# Patient Record
Sex: FEMALE | Race: WHITE | NOT HISPANIC OR LATINO | Employment: FULL TIME | URBAN - METROPOLITAN AREA
[De-identification: names, ages, dates, MRNs, and addresses within clinical notes are randomized per-mention and may not be internally consistent; named-entity substitution may affect disease eponyms.]

---

## 2015-03-03 LAB — PAP: NORMAL

## 2017-03-06 ENCOUNTER — TELEPHONE (OUTPATIENT)
Dept: OBGYN | Facility: CLINIC | Age: 37
End: 2017-03-06

## 2017-03-06 NOTE — TELEPHONE ENCOUNTER
1st OB 3/17. Pt takes advanced piliates and bar class. Wanted to know if safe to do. Informed pt about changes in balance, stamina and growing belly which all affect ability to exercise. ALso discussed the need to stop exercising if becomes light headed or dizzy. Pt also asked about her PNV not having FE in them and wanted to know if she needs to take a supplement. Informed pt to wait until OB visit and see what HGB is at that time, FE can cause constipation and do not need to take if HGB good. PNV does have DHA. No further questions.

## 2017-03-06 NOTE — TELEPHONE ENCOUNTER
Reason for Call:  Other call back    Detailed comments: Pt has 1st OB 3/27 with [facility].  Has questions about if she can still do advanced pilates and bar class while pregnant.    Phone Number Patient can be reached at: Home number on file [phone] (home)    Best Time: anytime    Can we leave a detailed message on this number? YES    Call taken on 3/6/2017 at 8:32 AM by Nahomy Mukherjee

## 2017-03-21 DIAGNOSIS — O36.80X0 ENCOUNTER TO DETERMINE FETAL VIABILITY OF PREGNANCY, NOT APPLICABLE OR UNSPECIFIED FETUS: Primary | ICD-10-CM

## 2017-03-27 ENCOUNTER — PRENATAL OFFICE VISIT (OUTPATIENT)
Dept: OBGYN | Facility: CLINIC | Age: 37
End: 2017-03-27
Payer: COMMERCIAL

## 2017-03-27 ENCOUNTER — RADIANT APPOINTMENT (OUTPATIENT)
Dept: ULTRASOUND IMAGING | Facility: CLINIC | Age: 37
End: 2017-03-27
Payer: COMMERCIAL

## 2017-03-27 VITALS
DIASTOLIC BLOOD PRESSURE: 73 MMHG | WEIGHT: 147.8 LBS | BODY MASS INDEX: 23.75 KG/M2 | HEIGHT: 66 IN | SYSTOLIC BLOOD PRESSURE: 107 MMHG | HEART RATE: 70 BPM

## 2017-03-27 DIAGNOSIS — Z36.9 ENCOUNTER FOR ANTENATAL SCREENING OF MOTHER: ICD-10-CM

## 2017-03-27 DIAGNOSIS — Z23 NEED FOR PROPHYLACTIC VACCINATION AND INOCULATION AGAINST INFLUENZA: ICD-10-CM

## 2017-03-27 DIAGNOSIS — O36.80X0 ENCOUNTER TO DETERMINE FETAL VIABILITY OF PREGNANCY, NOT APPLICABLE OR UNSPECIFIED FETUS: ICD-10-CM

## 2017-03-27 DIAGNOSIS — O09.521 HIGH-RISK PREGNANCY, ELDERLY MULTIGRAVIDA IN FIRST TRIMESTER: Primary | ICD-10-CM

## 2017-03-27 PROBLEM — O09.519 ELDERLY PRIMIGRAVIDA, ANTEPARTUM: Status: ACTIVE | Noted: 2017-03-27

## 2017-03-27 LAB
ABO + RH BLD: NORMAL
ABO + RH BLD: NORMAL
ALBUMIN UR-MCNC: NEGATIVE MG/DL
APPEARANCE UR: CLEAR
BACTERIA #/AREA URNS HPF: ABNORMAL /HPF
BASOPHILS # BLD AUTO: 0 10E9/L (ref 0–0.2)
BASOPHILS NFR BLD AUTO: 0.1 %
BILIRUB UR QL STRIP: NEGATIVE
BLD GP AB SCN SERPL QL: NORMAL
BLOOD BANK CMNT PATIENT-IMP: NORMAL
COLOR UR AUTO: YELLOW
DIFFERENTIAL METHOD BLD: NORMAL
EOSINOPHIL # BLD AUTO: 0.1 10E9/L (ref 0–0.7)
EOSINOPHIL NFR BLD AUTO: 0.5 %
ERYTHROCYTE [DISTWIDTH] IN BLOOD BY AUTOMATED COUNT: 12.1 % (ref 10–15)
GLUCOSE UR STRIP-MCNC: NEGATIVE MG/DL
HCT VFR BLD AUTO: 38.8 % (ref 35–47)
HGB BLD-MCNC: 13 G/DL (ref 11.7–15.7)
HGB UR QL STRIP: NEGATIVE
KETONES UR STRIP-MCNC: NEGATIVE MG/DL
LEUKOCYTE ESTERASE UR QL STRIP: NEGATIVE
LYMPHOCYTES # BLD AUTO: 3 10E9/L (ref 0.8–5.3)
LYMPHOCYTES NFR BLD AUTO: 32.4 %
MCH RBC QN AUTO: 31.4 PG (ref 26.5–33)
MCHC RBC AUTO-ENTMCNC: 33.5 G/DL (ref 31.5–36.5)
MCV RBC AUTO: 94 FL (ref 78–100)
MONOCYTES # BLD AUTO: 0.8 10E9/L (ref 0–1.3)
MONOCYTES NFR BLD AUTO: 8.9 %
MUCOUS THREADS #/AREA URNS LPF: PRESENT /LPF
NEUTROPHILS # BLD AUTO: 5.4 10E9/L (ref 1.6–8.3)
NEUTROPHILS NFR BLD AUTO: 58.1 %
NITRATE UR QL: NEGATIVE
NON-SQ EPI CELLS #/AREA URNS LPF: ABNORMAL /LPF
PH UR STRIP: 5.5 PH (ref 5–7)
PLATELET # BLD AUTO: 327 10E9/L (ref 150–450)
RBC # BLD AUTO: 4.14 10E12/L (ref 3.8–5.2)
RBC #/AREA URNS AUTO: ABNORMAL /HPF (ref 0–2)
SP GR UR STRIP: 1.02 (ref 1–1.03)
SPECIMEN EXP DATE BLD: NORMAL
URN SPEC COLLECT METH UR: ABNORMAL
UROBILINOGEN UR STRIP-ACNC: 0.2 EU/DL (ref 0.2–1)
WBC # BLD AUTO: 9.2 10E9/L (ref 4–11)
WBC #/AREA URNS AUTO: ABNORMAL /HPF (ref 0–2)

## 2017-03-27 PROCEDURE — 87389 HIV-1 AG W/HIV-1&-2 AB AG IA: CPT | Performed by: OBSTETRICS & GYNECOLOGY

## 2017-03-27 PROCEDURE — 86900 BLOOD TYPING SEROLOGIC ABO: CPT | Performed by: OBSTETRICS & GYNECOLOGY

## 2017-03-27 PROCEDURE — 86762 RUBELLA ANTIBODY: CPT | Performed by: OBSTETRICS & GYNECOLOGY

## 2017-03-27 PROCEDURE — 36415 COLL VENOUS BLD VENIPUNCTURE: CPT | Performed by: OBSTETRICS & GYNECOLOGY

## 2017-03-27 PROCEDURE — 85025 COMPLETE CBC W/AUTO DIFF WBC: CPT | Performed by: OBSTETRICS & GYNECOLOGY

## 2017-03-27 PROCEDURE — 99207 ZZC FIRST OB VISIT: CPT | Performed by: OBSTETRICS & GYNECOLOGY

## 2017-03-27 PROCEDURE — 86850 RBC ANTIBODY SCREEN: CPT | Performed by: OBSTETRICS & GYNECOLOGY

## 2017-03-27 PROCEDURE — 90471 IMMUNIZATION ADMIN: CPT | Performed by: OBSTETRICS & GYNECOLOGY

## 2017-03-27 PROCEDURE — 86901 BLOOD TYPING SEROLOGIC RH(D): CPT | Performed by: OBSTETRICS & GYNECOLOGY

## 2017-03-27 PROCEDURE — 76817 TRANSVAGINAL US OBSTETRIC: CPT | Performed by: OBSTETRICS & GYNECOLOGY

## 2017-03-27 PROCEDURE — 87340 HEPATITIS B SURFACE AG IA: CPT | Performed by: OBSTETRICS & GYNECOLOGY

## 2017-03-27 PROCEDURE — 90686 IIV4 VACC NO PRSV 0.5 ML IM: CPT | Performed by: OBSTETRICS & GYNECOLOGY

## 2017-03-27 PROCEDURE — 81001 URINALYSIS AUTO W/SCOPE: CPT | Performed by: OBSTETRICS & GYNECOLOGY

## 2017-03-27 PROCEDURE — 86780 TREPONEMA PALLIDUM: CPT | Performed by: OBSTETRICS & GYNECOLOGY

## 2017-03-27 PROCEDURE — 87086 URINE CULTURE/COLONY COUNT: CPT | Performed by: OBSTETRICS & GYNECOLOGY

## 2017-03-27 RX ORDER — PRENATAL VIT/IRON FUM/FOLIC AC 27MG-0.8MG
1 TABLET ORAL DAILY
COMMUNITY

## 2017-03-27 ASSESSMENT — ANXIETY QUESTIONNAIRES
5. BEING SO RESTLESS THAT IT IS HARD TO SIT STILL: NOT AT ALL
7. FEELING AFRAID AS IF SOMETHING AWFUL MIGHT HAPPEN: NOT AT ALL
GAD7 TOTAL SCORE: 0
6. BECOMING EASILY ANNOYED OR IRRITABLE: NOT AT ALL
3. WORRYING TOO MUCH ABOUT DIFFERENT THINGS: NOT AT ALL
2. NOT BEING ABLE TO STOP OR CONTROL WORRYING: NOT AT ALL
4. TROUBLE RELAXING: NOT AT ALL
IF YOU CHECKED OFF ANY PROBLEMS ON THIS QUESTIONNAIRE, HOW DIFFICULT HAVE THESE PROBLEMS MADE IT FOR YOU TO DO YOUR WORK, TAKE CARE OF THINGS AT HOME, OR GET ALONG WITH OTHER PEOPLE: NOT DIFFICULT AT ALL
1. FEELING NERVOUS, ANXIOUS, OR ON EDGE: NOT AT ALL

## 2017-03-27 NOTE — PROGRESS NOTES
This is a 36 year old female patient,   who presents for her first obstetrical visit.    Patient's last menstrual period was 2017 (approximate)..  This gives her an EDC of 2017 .  She is 8w0d weeks.  Her cycles are regular.  Her last menstrual period was normal.  She has had an ultrasound on 3/27/2017 which showed viable IUP measuring 8w0d. .  Since her LMP, she has experienced  nausea, emesis, fatigue, urinary frequency and breast tenderness, gas pains and difficulty sleeping.).  She denies abdominal pain, headache, loss of appetite, vaginal discharge, dysuria, pelvic pain, urinary urgency, lightheadedness, urinary frequency, vaginal bleeding, hemorrhoids and constipation.    Past History:  Her past medical history   Past Medical History:   Diagnosis Date     Exercise induced bronchospasm      Uncomplicated asthma     Exercise induced asthma, has an emergency inhaler is needed, hasn't used in many years.   .      This is her first pregnancy.  This was an unplanned pregnancy and patient took morning after pill day after she thinks she conceived  Stopped using alcohol and THC when she found out she was pregnant  Discussed ama and testing options   She wants to start with inatal only and will return for labs in 2 wks  Ob labs today  No other current medical issues    Since her last LMP she admits to the use of tobacco (cigarettes), alcohol and marijuana and none since found out is pregnant..    HISTORY:  Obstetric History       T0      TAB0   SAB0   E0   M0   L0       # Outcome Date GA Lbr Benito/2nd Weight Sex Delivery Anes PTL Lv   1 Current                 Past Medical History:   Diagnosis Date     Exercise induced bronchospasm      Uncomplicated asthma     Exercise induced asthma, has an emergency inhaler is needed, hasn't used in many years.     Past Surgical History:   Procedure Laterality Date     BREAST SURGERY  [id number]    Breast Augmentatation, saline implants.     ENT SURGERY   2000    [place] teeth removed.     Family History   Problem Relation Age of Onset     Other - See Comments Mother      Neck tumor-was removed and was benign.     Hypertension Mother      DIABETES Father      Colon Cancer Maternal Grandmother      Myocardial Infarction Maternal Grandfather      CEREBROVASCULAR DISEASE Paternal Grandmother      Myocardial Infarction Paternal Grandfather      CEREBROVASCULAR DISEASE Other      Myocardial Infarction Maternal Uncle      HEART DISEASE Maternal Uncle      CEREBROVASCULAR DISEASE Maternal Uncle      Social History     Social History     Marital status:      Spouse name: N/A     Number of children: N/A     Years of education: N/A     Social History Main Topics     Smoking status: Former Smoker     Years: 10.00     Quit date: 3/5/2017     Smokeless tobacco: Current User      Comment: 1-2 cigarettes per day; social smoker     Alcohol use Yes      Comment: once per week socially     Drug use: No     Sexual activity: Yes     Partners: Male     Birth control/ protection: Inserts      Comment: concetprol     Other Topics Concern     None     Social History Narrative     Current Outpatient Prescriptions   Medication Sig     Prenatal Vit-Fe Fumarate-FA (PRENATAL MULTIVITAMIN  PLUS IRON) 27-0.8 MG TABS per tablet Take 1 tablet by mouth daily     Pediatric Multivitamins-Fl (MULTI VIT/FL PO) Reported on 3/27/2017     NEW MED Reported on 3/27/2017     DIPROLENE AF 0.05 % EX CREA apply twice daily on the affected areas (Patient not taking: No sig reported)     No current facility-administered medications for this visit.      Allergies   Allergen Reactions     Nkda [No Known Drug Allergies]        Past medical, surgical, social and family history were reviewed and updated in EPIC.    ROS:   12 point review of systems negative other than symptoms noted below.  Constitutional: Fatigue  Breast: Tenderness  Gastrointestinal: Nausea and Vomiting  Genitourinary: Frequency  Psychiatric:  "Difficulty Sleeping    EXAM:  /73 (BP Location: Right arm, Patient Position: Chair, Cuff Size: Adult Regular)  Pulse 70  Ht 1.664 m (5' 5.5\")  Wt 67 kg (147 lb 12.8 oz)  LMP 2017 (Approximate)  BMI 24.22 kg/m2   BMI: Body mass index is 24.22 kg/(m^2).    EXAM:  Constitutional:  Appearance: Well nourished, well developed alert, in no acute distress.    Neurologic/Psychiatric:    Mental Status:  Oriented X3.    No Pelvic Exam performed    ASSESSMENT:      ICD-10-CM    1. High-risk pregnancy, elderly multigravida in first trimester O09.521    2. Encounter for  screening of mother Z36 Conventional pap smear, diagnostic     UA with Microscopic     ABO/Rh type and screen     Anti Treponema     CBC with platelets differential     Hepatitis B surface antigen     HIV Antigen Antibody Combo     Rubella Antibody IgG Quantitative     Urine Culture Aerobic Bacterial     Prenatal trisomy 21,18,13   3. Need for prophylactic vaccination and inoculation against influenza Z23 FLU VAC, SPLIT VIRUS IM > 3 YO (QUADRIVALENT) [97255]     Vaccine Administration, Initial [31132]       PLAN/PATIENT INSTRUCTIONS:    There are no Patient Instructions on file for this visit.    Face to face time 30 minute  Discussed ob dating, treatment of nausea, genetic screening in details  Reviewed us results  Labs today    Caryn Lozano MD  "

## 2017-03-27 NOTE — NURSING NOTE
[facility] Obstetrical Risk History    Patient presents for new OB labs and teaching.      1. Please indicate any condition you have or have had in the past:  Asthma,   2. Do you smoke?  Yes-stopped once she found out is pregnant  If yes, how many packs/day?   3. Do you drink alcoholic beverages? Yes-stopped when found out is pregnant  If yes, how often?  What type?   4. List any medications taken since your last period: Prenatal Vitamins, Probiotic  5. List any recreational drugs (cocaine, marijuana, etc. used since your last period: Marijuana on 3/4/17-did that socially, none since found out is pregnant     6. List any chemical or radiation exposure that you've encountered: None  7. Are you on a restricted diet? No  If yes, please describe:  Do you have any [employer] objections to any form of treatment? No    GENETIC SCREENING    These questions apply to you, the baby's father, or anyone in either family with:    1. Patient's age 35 or greater at delivery? Yes-pt is over 35 years old  2. [place]ian, [place]ib[place]ian, Mediterranean ancestry? No  3. Neural Tube Defect (Meningomyelocele, Spina Bifida, or Anencephaly)? No  4. Scientologist, Sinhala [place] or history of Jordy-Sachs disease? No  5. Down's Syndrome?   No  6. Hemophilia or clotting disorder? No  7. Muscular Dystrophy? No  8. Cystic Fibrosis? No  9. Feliberto's Chorea? No//  10. Mental Retardation? No  11. 3 or more miscarriages or a stillborn? No  12. Other inherited disease or chromosomal disorder? No  13. Have you or the baby's father had a child born with a birth defect? No  14. Did you or the baby's father have a birth defect yourselves? No    Do you have any other concerns about birth defects? No      1st pregnancy. Last time was in the office was on 3/2/15 which was for her annual exam. Has not had an annual since then.    Pt came to the NOB class and was informed of genetic screening/genetic testing. Pt states she is wanting to have the Innatal (Verifi) done.  Pt aware she has to be at least 10 wks pregnant. Lab order placed/futured out.    Last PAP was done at our clinic ([facility]) on 3/3/15 and results were NIL and HPV Negative.     Did not get a Flu Shot this season. Pt stated she will more than likely get one today. Discussed precautions with pregnancy and the flu and that the immunization is recommended.    Conceived on 2/11/17 and took the morning after pill on 2/12/17.

## 2017-03-27 NOTE — PROGRESS NOTES
Injectable Influenza Immunization Documentation    1.  Is the person to be vaccinated sick today?  No    2. Does the person to be vaccinated have an allergy to eggs or to a component of the vaccine?  No    3. Has the person to be vaccinated today ever had a serious reaction to influenza vaccine in the past?  No    4. Has the person to be vaccinated ever had Guillain-[place] syndrome?  No     Form completed by Marlena Rashid CMA

## 2017-03-27 NOTE — Clinical Note
Please abstract the following data from this visit with this patient into the appropriate field in Epic:  Pap smear done on this date: 3/3/2015 (approximately), by this group: [facility], results were Negative for Intraepithelial Lesion or Malignancy.

## 2017-03-27 NOTE — MR AVS SNAPSHOT
After Visit Summary   3/27/2017    An Maya    MRN: [id number]           Patient Information     Date Of Birth          1980        Visit Information        Provider Department      3/27/2017 10:50 AM Caryn Lozano MD; [facility] [place]        Today's Diagnoses     Encounter for  screening of mother    -  1    Elderly primigravida, antepartum        Need for prophylactic vaccination and inoculation against influenza           Follow-ups after your visit        Your next 10 appointments already scheduled     Apr 10, 2017 10:00 AM CDT   LAB with [facility]   [facility] ([facility] [place])    [address] [id number] [phone]           Patient must bring picture ID.  Patient should be prepared to give a urine specimen  Please do not eat 10-12 hours before your appointment if you are coming in fasting for labs on lipids, cholesterol, or glucose (sugar).  Pregnant women should follow their Care Team instructions. Water with medications is okay. Do not drink coffee or other fluids.   If you have concerns about taking  your medications, please ask at office or if scheduling via [email or web address], send a message by clicking on Secure Messaging, Message Your Care Team.            2017  9:50 AM CDT   ESTABLISHED PRENATAL with Caryn Lozano MD   [facility] ([facility] [place])    [address] [place] [id number] [phone]              Future tests that were ordered for you today     Open Future Orders        Priority Expected Expires Ordered    Prenatal trisomy 21,18,13 Routine 4/10/2017 2017 3/27/2017            Who to contact     If you have questions or need follow up information about today's clinic visit or your schedule please contact [facility] [place] directly at [phone].  Normal or non-critical lab and  "imaging results will be communicated to you by MyChart, letter or phone within 4 business days after the clinic has received the results. If you do not hear from us within 7 days, please contact the clinic through HDmessagingt or phone. If you have a critical or abnormal lab result, we will notify you by phone as soon as possible.  Submit refill requests through EZDOCTOR or call your pharmacy and they will forward the refill request to us. Please allow 3 business days for your refill to be completed.          Additional Information About Your Visit        DaptharLumetric Lighting Information     EZDOCTOR lets you send messages to your doctor, view your test results, renew your prescriptions, schedule appointments and more. To sign up, go to [email or web address] . Click on \"Log in\" on the left side of the screen, which will take you to the Welcome page. Then click on \"Sign up Now\" on the right side of the page.     You will be asked to enter the access code listed below, as well as some personal information. Please follow the directions to create your username and password.     Your access code is: [email or web address]  Expires: 2017 12:10 PM     Your access code will  in 90 days. If you need help or a new code, please call your [place] clinic or [phone].        Care EveryWhere ID     This is your Care EveryWhere ID. This could be used by other organizations to access your [place] medical records  [id number]        Your Vitals Were     Pulse Height Last Period BMI (Body Mass Index)          70 5' 5.5\" (1.664 m) 2017 (Approximate) 24.22 kg/m2         Blood Pressure from Last 3 Encounters:   17 107/73   10/13/11 119/75   09 95/61    Weight from Last 3 Encounters:   17 147 lb 12.8 oz (67 kg)   09 142 lb 8 oz (64.6 kg)              We Performed the Following     ABO/Rh type and screen     Anti Treponema     CBC with platelets differential     Conventional pap smear, diagnostic     FLU VAC, SPLIT " VIRUS IM > 3 YO (QUADRIVALENT) [[id number]]     Hepatitis B surface antigen     HIV Antigen Antibody Combo     Rubella Antibody IgG Quantitative     UA with Microscopic     Urine Culture Aerobic Bacterial     Vaccine Administration, Initial [[id number]]        Primary Care Provider    None Specified       No primary provider on file.        Thank you!     Thank you for choosing [facility] [place]  for your care. Our goal is always to provide you with excellent care. Hearing back from our patients is one way we can continue to improve our services. Please take a few minutes to complete the written survey that you may receive in the mail after your visit with us. Thank you!             Your Updated Medication List - Protect others around you: Learn how to safely use, store and throw away your medicines at www.disposemymeds.org.          This list is accurate as of: 3/27/17 12:10 PM.  Always use your most recent med list.                   Brand Name Dispense Instructions for use    DIPROLENE AF 0.05 % cream   Generic drug:  augmented betamethasone dipropionate     1tube    apply twice daily on the affected areas       MULTI VIT/FL PO      Reported on 3/27/2017       NEW MED      Reported on 3/27/2017       prenatal multivitamin  plus iron 27-0.8 MG Tabs per tablet      Take 1 tablet by mouth daily

## 2017-03-28 LAB
BACTERIA SPEC CULT: NO GROWTH
HBV SURFACE AG SERPL QL IA: NONREACTIVE
HIV 1+2 AB+HIV1 P24 AG SERPL QL IA: NORMAL
Lab: NORMAL
MICRO REPORT STATUS: NORMAL
RUBV IGG SERPL IA-ACNC: 26 IU/ML
SPECIMEN SOURCE: NORMAL
T PALLIDUM IGG+IGM SER QL: NEGATIVE

## 2017-03-28 ASSESSMENT — ANXIETY QUESTIONNAIRES: GAD7 TOTAL SCORE: 0

## 2017-03-28 ASSESSMENT — PATIENT HEALTH QUESTIONNAIRE - PHQ9: SUM OF ALL RESPONSES TO PHQ QUESTIONS 1-9: 0

## 2017-04-10 ENCOUNTER — TRANSFERRED RECORDS (OUTPATIENT)
Dept: HEALTH INFORMATION MANAGEMENT | Facility: CLINIC | Age: 37
End: 2017-04-10

## 2017-04-10 DIAGNOSIS — Z36.9 ENCOUNTER FOR ANTENATAL SCREENING OF MOTHER: ICD-10-CM

## 2017-04-10 PROCEDURE — 40000791 ZZHCL STATISTIC VERIFI PRENATAL TRISOMY 21,18,13: Mod: 90 | Performed by: OBSTETRICS & GYNECOLOGY

## 2017-04-10 PROCEDURE — 36415 COLL VENOUS BLD VENIPUNCTURE: CPT | Performed by: OBSTETRICS & GYNECOLOGY

## 2017-04-10 PROCEDURE — 99000 SPECIMEN HANDLING OFFICE-LAB: CPT | Performed by: OBSTETRICS & GYNECOLOGY

## 2017-04-17 ENCOUNTER — TELEPHONE (OUTPATIENT)
Dept: NURSING | Facility: CLINIC | Age: 37
End: 2017-04-17

## 2017-04-17 DIAGNOSIS — O09.519 ELDERLY PRIMIGRAVIDA, ANTEPARTUM: ICD-10-CM

## 2017-04-17 NOTE — TELEPHONE ENCOUNTER
Verifi results: Negative    Test    Result     Interpretation  Chromosome 21  No aneuploidy detected     Results consistent with two copies of chromosome 21  Chromosome 18  No aneuploidy detected  Results consistent with two copies of chromosome 18  Chromosome 13  No aneuploidy detected  Results consistent with two copies of chromosome 13  Sex Chromosome  No aneuploidy detected  Results consistent with two sex chromosomes (XY-Male)    [facility]

## 2017-04-20 ENCOUNTER — TELEPHONE (OUTPATIENT)
Dept: NURSING | Facility: CLINIC | Age: 37
End: 2017-04-20

## 2017-04-20 NOTE — TELEPHONE ENCOUNTER
Pt spoke to scheduling and had questions about what to take with her current cough.  Pt 11w3d pregnant. Called pt. She stated she started to get a cold on Monday 11/17/17. Pt denies fever or muscle or body aches, states some chest congestion in the morning, sinus congestion and cough. Informed pt she could use saline nasal spray, afrin nasal spray for 3-4 days, plain sudafed, tylenol, cough drops, and/or plain Robitussin. Pt stated understanding and had no further questions.

## 2017-04-24 ENCOUNTER — PRENATAL OFFICE VISIT (OUTPATIENT)
Dept: OBGYN | Facility: CLINIC | Age: 37
End: 2017-04-24
Payer: COMMERCIAL

## 2017-04-24 VITALS — BODY MASS INDEX: 25.07 KG/M2 | SYSTOLIC BLOOD PRESSURE: 102 MMHG | WEIGHT: 153 LBS | DIASTOLIC BLOOD PRESSURE: 62 MMHG

## 2017-04-24 DIAGNOSIS — O09.511: ICD-10-CM

## 2017-04-24 PROCEDURE — 99207 ZZC PRENATAL VISIT: CPT | Performed by: OBSTETRICS & GYNECOLOGY

## 2017-04-24 NOTE — MR AVS SNAPSHOT
"              After Visit Summary   4/24/2017    An Maya    MRN: [id number]           Patient Information     Date Of Birth          1980        Visit Information        Provider Department      4/24/2017 9:50 AM Caryn Lozano MD [facility]        Today's Diagnoses     Elderly primigravida, antepartum, first trimester           Follow-ups after your visit        Your next 10 appointments already scheduled     May 23, 2017 10:40 AM CDT   ESTABLISHED PRENATAL with Caryn Lozano MD   [facility] ([facility])    [address] [place] [id number]   [phone]              Who to contact     If you have questions or need follow up information about today's clinic visit or your schedule please contact [facility] directly at [phone].  Normal or non-critical lab and imaging results will be communicated to you by MyChart, letter or phone within 4 business days after the clinic has received the results. If you do not hear from us within 7 days, please contact the clinic through MyChart or phone. If you have a critical or abnormal lab result, we will notify you by phone as soon as possible.  Submit refill requests through [email or web address] or call your pharmacy and they will forward the refill request to us. Please allow 3 business days for your refill to be completed.          Additional Information About Your Visit        MyChart Information     [email or web address] lets you send messages to your doctor, view your test results, renew your prescriptions, schedule appointments and more. To sign up, go to [email or web address] . Click on \"Log in\" on the left side of the screen, which will take you to the Welcome page. Then click on \"Sign up Now\" on the right side of the page.     You will be asked to enter the access code listed below, as well as some personal information. Please follow the directions to create your " username and password.     Your access code is: [email or web address]  Expires: 2017 12:10 PM     Your access code will  in 90 days. If you need help or a new code, please call your [place] clinic or [phone].        Care EveryWhere ID     This is your Care EveryWhere ID. This could be used by other organizations to access your [place] medical records  [id number]        Your Vitals Were     Last Period BMI (Body Mass Index)                2017 (Approximate) 25.07 kg/m2           Blood Pressure from Last 3 Encounters:   17 102/62   17 107/73   10/13/11 119/75    Weight from Last 3 Encounters:   17 153 lb (69.4 kg)   17 147 lb 12.8 oz (67 kg)   09 142 lb 8 oz (64.6 kg)              Today, you had the following     No orders found for display       Primary Care Provider    None Specified       No primary provider on file.        Thank you!     Thank you for choosing [facility] [place]  for your care. Our goal is always to provide you with excellent care. Hearing back from our patients is one way we can continue to improve our services. Please take a few minutes to complete the written survey that you may receive in the mail after your visit with us. Thank you!             Your Updated Medication List - Protect others around you: Learn how to safely use, store and throw away your medicines at www.disposemymeds.org.          This list is accurate as of: 17  9:58 AM.  Always use your most recent med list.                   Brand Name Dispense Instructions for use    prenatal multivitamin  plus iron 27-0.8 MG Tabs per tablet      Take 1 tablet by mouth daily

## 2017-05-23 ENCOUNTER — PRENATAL OFFICE VISIT (OUTPATIENT)
Dept: OBGYN | Facility: CLINIC | Age: 37
End: 2017-05-23
Payer: COMMERCIAL

## 2017-05-23 VITALS — SYSTOLIC BLOOD PRESSURE: 92 MMHG | BODY MASS INDEX: 25.89 KG/M2 | WEIGHT: 158 LBS | DIASTOLIC BLOOD PRESSURE: 68 MMHG

## 2017-05-23 DIAGNOSIS — O09.512: ICD-10-CM

## 2017-05-23 DIAGNOSIS — Z36.1 NEED FOR MATERNAL SERUM ALPHA-PROTEIN (MSAFP) SCREENING: Primary | ICD-10-CM

## 2017-05-23 PROCEDURE — 36415 COLL VENOUS BLD VENIPUNCTURE: CPT | Performed by: OBSTETRICS & GYNECOLOGY

## 2017-05-23 PROCEDURE — 82105 ALPHA-FETOPROTEIN SERUM: CPT | Mod: 90 | Performed by: OBSTETRICS & GYNECOLOGY

## 2017-05-23 PROCEDURE — 99000 SPECIMEN HANDLING OFFICE-LAB: CPT | Performed by: OBSTETRICS & GYNECOLOGY

## 2017-05-23 PROCEDURE — 99207 ZZC PRENATAL VISIT: CPT | Performed by: OBSTETRICS & GYNECOLOGY

## 2017-05-23 NOTE — MR AVS SNAPSHOT
After Visit Summary   5/23/2017    An Maya    MRN: [id number]           Patient Information     Date Of Birth          1980        Visit Information        Provider Department      5/23/2017 10:40 AM Caryn Lozano MD [facility] [place]        Today's Diagnoses     Need for maternal serum alpha-protein (MSAFP) screening    -  1    Elderly primigravida, antepartum, second trimester           Follow-ups after your visit        Your next 10 appointments already scheduled     Jun 20, 2017  9:00 AM CDT   US PELVIC COMPLETE W TRANSVAGINAL with WEUS1   [facility] [facility] ([facility])    [address] [address]   [phone]           Please bring a list of your medicines (including vitamins, minerals and over-the-counter drugs). Also, tell your doctor about any allergies you may have. Wear comfortable clothes and leave your valuables at home.  Adults: Drink six 8-ounce glasses of fluid one hour before your exam. Do NOT empty your bladder.  If you need to empty your bladder before your exam, try to release only a little bit of urine. Then, drink another 8oz glass of fluid.  Children: Children who are potty trained should drink at least 4 cups (32 oz) of liquid 45 minutes to one hour prior to the exam. The child s bladder must be full in order to achieve a diagnostic exam. If your child is very uncomfortable or has an urgent need to pee, please notify a technologist; they will try to find out how much longer the wait may be and provide instructions to help relieve the pressure. Occasionally it is medically necessary to insert a urinary catheter to fill the bladder.  Please call the Imaging Department at your exam site with any questions.            Jun 20, 2017  9:40 AM CDT   ESTABLISHED PRENATAL with Caryn Lozano MD   [facility] [place] ([facility] [place])    [address] [facility]  "[facility] [address]  [facility] [address]   [phone]              Who to contact     If you have questions or need follow up information about today's clinic visit or your schedule please contact [facility] directly at [phone].  Normal or non-critical lab and imaging results will be communicated to you by MyChart, letter or phone within 4 business days after the clinic has received the results. If you do not hear from us within 7 days, please contact the clinic through MyChart or phone. If you have a critical or abnormal lab result, we will notify you by phone as soon as possible.  Submit refill requests through [facility] or call your pharmacy and they will forward the refill request to us. Please allow 3 business days for your refill to be completed.          Additional Information About Your Visit        MyChart Information     [facility] lets you send messages to your doctor, view your test results, renew your prescriptions, schedule appointments and more. To sign up, go to [email or web address] . Click on \"Log in\" on the left side of the screen, which will take you to the Welcome page. Then click on \"Sign up Now\" on the right side of the page.     You will be asked to enter the access code listed below, as well as some personal information. Please follow the directions to create your username and password.     Your access code is: [email or web address]  Expires: 2017 12:10 PM     Your access code will  in 90 days. If you need help or a new code, please call your [place] clinic or [phone].        Care EveryWhere ID     This is your Care EveryWhere ID. This could be used by other organizations to access your [place] medical records  [id number]        Your Vitals Were     Last Period BMI (Body Mass Index)                2017 (Approximate) 25.89 kg/m2           Blood Pressure from Last 3 Encounters:   17 92/68   17 102/62   17 107/73    Weight from Last 3 " Encounters:   05/23/17 158 lb (71.7 kg)   04/24/17 153 lb (69.4 kg)   03/27/17 147 lb 12.8 oz (67 kg)              We Performed the Following     Alpha fetoprotein maternal screen        Primary Care Provider    None Specified       No primary provider on file.        Thank you!     Thank you for choosing [facility]  for your care. Our goal is always to provide you with excellent care. Hearing back from our patients is one way we can continue to improve our services. Please take a few minutes to complete the written survey that you may receive in the mail after your visit with us. Thank you!             Your Updated Medication List - Protect others around you: Learn how to safely use, store and throw away your medicines at www.disposemymeds.org.          This list is accurate as of: 5/23/17 11:18 AM.  Always use your most recent med list.                   Brand Name Dispense Instructions for use    prenatal multivitamin  plus iron 27-0.8 MG Tabs per tablet      Take 1 tablet by mouth daily

## 2017-05-26 LAB
# FETUSES US: NORMAL
AFP ADJ MOM AMN: 1.47
AFP SERPL-MCNC: 47 NG/ML
AGE - REPORTED: 38
DATING METHOD: NORMAL
DIABETIC AT CONCEPTION: NO
FAMILY MEMBER DISEASES HX: NO
FAMILY MEMBER DISEASES HX: NO
GA METHOD: NORMAL
GA: 16.14 WK
HX OF HEREDITARY DISORDERS: NO
IDDM PATIENT QL: NO
INTEGRATED SCN PATIENT-IMP: NORMAL
LMP START DATE: NORMAL
PREV HX CHROMOSOME ABNORMALITY: NO
SPECIMEN DRAWN SERPL: NORMAL
TWINS: NO

## 2017-06-20 ENCOUNTER — PRENATAL OFFICE VISIT (OUTPATIENT)
Dept: OBGYN | Facility: CLINIC | Age: 37
End: 2017-06-20
Payer: COMMERCIAL

## 2017-06-20 ENCOUNTER — RADIANT APPOINTMENT (OUTPATIENT)
Dept: ULTRASOUND IMAGING | Facility: CLINIC | Age: 37
End: 2017-06-20
Payer: COMMERCIAL

## 2017-06-20 VITALS — WEIGHT: 167 LBS | BODY MASS INDEX: 27.37 KG/M2 | DIASTOLIC BLOOD PRESSURE: 60 MMHG | SYSTOLIC BLOOD PRESSURE: 100 MMHG

## 2017-06-20 DIAGNOSIS — O09.512: Primary | ICD-10-CM

## 2017-06-20 DIAGNOSIS — Z36.89 ENCOUNTER FOR FETAL ANATOMIC SURVEY: ICD-10-CM

## 2017-06-20 PROCEDURE — 76805 OB US >/= 14 WKS SNGL FETUS: CPT | Performed by: OBSTETRICS & GYNECOLOGY

## 2017-06-20 PROCEDURE — 99207 ZZC PRENATAL VISIT: CPT | Performed by: OBSTETRICS & GYNECOLOGY

## 2017-06-20 NOTE — MR AVS SNAPSHOT
After Visit Summary   6/20/2017    An Maya    MRN: [id number]           Patient Information     Date Of Birth          1980        Visit Information        Provider Department      6/20/2017 9:40 AM Caryn Lozano MD [facility] [place]         Follow-ups after your visit        Your next 10 appointments already scheduled     Jul 18, 2017  9:00 AM CDT   ESTABLISHED PRENATAL with Caryn Lozano MD   [facility] [place] ([facility])    [address] [place] [address]   [phone]            Aug 15, 2017  8:00 AM CDT   LAB with WE LAB   [facility] ([facility] [place])    [address] [place] [id number] [phone]           Patient must bring picture ID.  Patient should be prepared to give a urine specimen  Please do not eat 10-12 hours before your appointment if you are coming in fasting for labs on lipids, cholesterol, or glucose (sugar).  Pregnant women should follow their Care Team instructions. Water with medications is okay. Do not drink coffee or other fluids.   If you have concerns about taking  your medications, please ask at office or if scheduling via [employer], send a message by clicking on Secure Messaging, Message Your Care Team.            Aug 15, 2017  8:15 AM CDT   US PELVIC COMPLETE W TRANSVAGINAL with WEUS1   [facility] [place] ([facility] [place])    [address] [place] [id number] [phone]           Please bring a list of your medicines (including vitamins, minerals and over-the-counter drugs). Also, tell your doctor about any allergies you may have. Wear comfortable clothes and leave your valuables at home.  Adults: Drink six 8-ounce glasses of fluid one hour before your exam. Do NOT empty your bladder.  If you need to empty your bladder before your exam, try to release only a  "little bit of urine. Then, drink another 8oz glass of fluid.  Children: Children who are potty trained should drink at least 4 cups (32 oz) of liquid 45 minutes to one hour prior to the exam. The child s bladder must be full in order to achieve a diagnostic exam. If your child is very uncomfortable or has an urgent need to pee, please notify a technologist; they will try to find out how much longer the wait may be and provide instructions to help relieve the pressure. Occasionally it is medically necessary to insert a urinary catheter to fill the bladder.  Please call the Imaging Department at your exam site with any questions.            Aug 15, 2017  9:00 AM CDT   ESTABLISHED PRENATAL with Caryn Lozano MD   [facility] ([facility])    [address] [place] [address] [phone]              Who to contact     If you have questions or need follow up information about today's clinic visit or your schedule please contact [facility] directly at [phone].  Normal or non-critical lab and imaging results will be communicated to you by "[employer]"hart, letter or phone within 4 business days after the clinic has received the results. If you do not hear from us within 7 days, please contact the clinic through "[employer]hart or phone. If you have a critical or abnormal lab result, we will notify you by phone as soon as possible.  Submit refill requests through [employer] or call your pharmacy and they will forward the refill request to us. Please allow 3 business days for your refill to be completed.          Additional Information About Your Visit        "[employer]hart Information     [employer] lets you send messages to your doctor, view your test results, renew your prescriptions, schedule appointments and more. To sign up, go to [email or web address] . Click on \"Log in\" on the left side of the screen, which will take you to the Welcome page. Then " "click on \"Sign up Now\" on the right side of the page.     You will be asked to enter the access code listed below, as well as some personal information. Please follow the directions to create your username and password.     Your access code is: [email or web address]  Expires: 2017 12:10 PM     Your access code will  in 90 days. If you need help or a new code, please call your [place] clinic or [phone].        Care EveryWhere ID     This is your Care EveryWhere ID. This could be used by other organizations to access your [place] medical records  [id number]        Your Vitals Were     Last Period BMI (Body Mass Index)                2017 (Approximate) 27.37 kg/m2           Blood Pressure from Last 3 Encounters:   17 100/60   17 92/68   17 102/62    Weight from Last 3 Encounters:   17 167 lb (75.8 kg)   17 158 lb (71.7 kg)   17 153 lb (69.4 kg)              Today, you had the following     No orders found for display       Primary Care Provider    None Specified       No primary provider on file.        Thank you!     Thank you for choosing [facility] [place]  for your care. Our goal is always to provide you with excellent care. Hearing back from our patients is one way we can continue to improve our services. Please take a few minutes to complete the written survey that you may receive in the mail after your visit with us. Thank you!             Your Updated Medication List - Protect others around you: Learn how to safely use, store and throw away your medicines at www.disposemymeds.org.          This list is accurate as of: 17 10:30 AM.  Always use your most recent med list.                   Brand Name Dispense Instructions for use    prenatal multivitamin  plus iron 27-0.8 MG Tabs per tablet      Take 1 tablet by mouth daily         "

## 2017-07-11 LAB — NON INVASIVE PRENATAL TEST CELL FREE DNA: NORMAL

## 2017-07-18 ENCOUNTER — PRENATAL OFFICE VISIT (OUTPATIENT)
Dept: OBGYN | Facility: CLINIC | Age: 37
End: 2017-07-18
Payer: COMMERCIAL

## 2017-07-18 VITALS — DIASTOLIC BLOOD PRESSURE: 60 MMHG | SYSTOLIC BLOOD PRESSURE: 100 MMHG | WEIGHT: 173 LBS | BODY MASS INDEX: 28.35 KG/M2

## 2017-07-18 DIAGNOSIS — O09.512: Primary | ICD-10-CM

## 2017-07-18 PROCEDURE — 99207 ZZC PRENATAL VISIT: CPT | Performed by: OBSTETRICS & GYNECOLOGY

## 2017-07-18 NOTE — PROGRESS NOTES
Recheck placenta 4 wks  Gluc screen next time  Discussed parts of birth plan, considering nitrous  Some carpal tunnel  Good fm

## 2017-07-18 NOTE — MR AVS SNAPSHOT
After Visit Summary   7/18/2017    An Maya    MRN: [id number]           Patient Information     Date Of Birth          1980        Visit Information        Provider Department      7/18/2017 9:00 AM Caryn Lozano MD [facility]        Today's Diagnoses     Elderly primigravida, antepartum, second trimester    -  1       Follow-ups after your visit        Your next 10 appointments already scheduled     Aug 15, 2017  8:00 AM CDT   LAB with WE LAB   [facility] ([facility] [place])    [address] [place] [address]   [phone]           Patient must bring picture ID.  Patient should be prepared to give a urine specimen  Please do not eat 10-12 hours before your appointment if you are coming in fasting for labs on lipids, cholesterol, or glucose (sugar).  Pregnant women should follow their Care Team instructions. Water with medications is okay. Do not drink coffee or other fluids.   If you have concerns about taking  your medications, please ask at office or if scheduling via [employer], send a message by clicking on Secure Messaging, Message Your Care Team.            Aug 15, 2017  8:15 AM CDT   US PELVIC COMPLETE W TRANSVAGINAL with WEUS1   [facility] ([facility] [place])    [address] [place] [id number] [phone]           Please bring a list of your medicines (including vitamins, minerals and over-the-counter drugs). Also, tell your doctor about any allergies you may have. Wear comfortable clothes and leave your valuables at home.  Adults: Drink six 8-ounce glasses of fluid one hour before your exam. Do NOT empty your bladder.  If you need to empty your bladder before your exam, try to release only a little bit of urine. Then, drink another 8oz glass of fluid.  Children: Children who are potty trained should drink at least 4 cups (32 oz) of  "liquid 45 minutes to one hour prior to the exam. The child s bladder must be full in order to achieve a diagnostic exam. If your child is very uncomfortable or has an urgent need to pee, please notify a technologist; they will try to find out how much longer the wait may be and provide instructions to help relieve the pressure. Occasionally it is medically necessary to insert a urinary catheter to fill the bladder.  Please call the Imaging Department at your exam site with any questions.            Aug 15, 2017  9:00 AM CDT   ESTABLISHED PRENATAL with Caryn Lozano MD   [facility] ([facility])    [address] [place] [id number] [phone]              Who to contact     If you have questions or need follow up information about today's clinic visit or your schedule please contact [facility] directly at [phone].  Normal or non-critical lab and imaging results will be communicated to you by MyChart, letter or phone within 4 business days after the clinic has received the results. If you do not hear from us within 7 days, please contact the clinic through [place] or phone. If you have a critical or abnormal lab result, we will notify you by phone as soon as possible.  Submit refill requests through [employer] or call your pharmacy and they will forward the refill request to us. Please allow 3 business days for your refill to be completed.          Additional Information About Your Visit        [employer] Information     [employer] lets you send messages to your doctor, view your test results, renew your prescriptions, schedule appointments and more. To sign up, go to [email or web address] . Click on \"Log in\" on the left side of the screen, which will take you to the Welcome page. Then click on \"Sign up Now\" on the right side of the page.     You will be asked to enter the access code listed below, as well as some personal " information. Please follow the directions to create your username and password.     Your access code is: [email or web address]  Expires: 10/16/2017  9:49 AM     Your access code will  in 90 days. If you need help or a new code, please call your [place] clinic or [phone].        Care EveryWhere ID     This is your Care EveryWhere ID. This could be used by other organizations to access your [place] medical records  [id number]        Your Vitals Were     Last Period BMI (Body Mass Index)                2017 (Approximate) 28.35 kg/m2           Blood Pressure from Last 3 Encounters:   17 100/60   17 100/60   17 92/68    Weight from Last 3 Encounters:   17 173 lb (78.5 kg)   17 167 lb (75.8 kg)   17 158 lb (71.7 kg)              Today, you had the following     No orders found for display       Primary Care Provider    None Specified       No primary provider on file.        Equal Access to Services     [facility] Hospital[facility] : Hadii ingris rodriguez hadasho Soomaali, waaxda luqadaha, qaybta kaalmada adedanyyagiles, mell zhao . So [facility] [phone].    ATENCIÓN: Si habla español, tiene a hsu disposición servicios gratuitos de asistencia lingüística. Llame al [phone].    We comply with applicable federal civil rights laws and [place] laws. We do not discriminate on the basis of race, color, national origin, age, disability sex, sexual orientation or gender identity.            Thank you!     Thank you for choosing [facility]  for your care. Our goal is always to provide you with excellent care. Hearing back from our patients is one way we can continue to improve our services. Please take a few minutes to complete the written survey that you may receive in the mail after your visit with us. Thank you!             Your Updated Medication List - Protect others around you: Learn how to safely use, store and throw away your medicines at  www.disposemymeds.org.          This list is accurate as of: 7/18/17  9:49 AM.  Always use your most recent med list.                   Brand Name Dispense Instructions for use Diagnosis    prenatal multivitamin  plus iron 27-0.8 MG Tabs per tablet      Take 1 tablet by mouth daily

## 2017-08-15 ENCOUNTER — PRENATAL OFFICE VISIT (OUTPATIENT)
Dept: OBGYN | Facility: CLINIC | Age: 37
End: 2017-08-15
Payer: COMMERCIAL

## 2017-08-15 ENCOUNTER — RADIANT APPOINTMENT (OUTPATIENT)
Dept: ULTRASOUND IMAGING | Facility: CLINIC | Age: 37
End: 2017-08-15
Payer: COMMERCIAL

## 2017-08-15 VITALS — SYSTOLIC BLOOD PRESSURE: 98 MMHG | BODY MASS INDEX: 29.33 KG/M2 | WEIGHT: 179 LBS | DIASTOLIC BLOOD PRESSURE: 70 MMHG

## 2017-08-15 DIAGNOSIS — Z29.13 NEED FOR RHOGAM DUE TO RH NEGATIVE MOTHER: ICD-10-CM

## 2017-08-15 DIAGNOSIS — Z23 NEED FOR TDAP VACCINATION: ICD-10-CM

## 2017-08-15 DIAGNOSIS — Z36.9 ENCOUNTER FOR ANTENATAL SCREENING OF MOTHER: Primary | ICD-10-CM

## 2017-08-15 DIAGNOSIS — O44.40 LOW-LYING PLACENTA: ICD-10-CM

## 2017-08-15 DIAGNOSIS — O09.513 ELDERLY PRIMIGRAVIDA, ANTEPARTUM, THIRD TRIMESTER: ICD-10-CM

## 2017-08-15 LAB
BLD GP AB SCN SERPL QL: NORMAL
GLUCOSE 1H P 50 G GLC PO SERPL-MCNC: 105 MG/DL (ref 60–129)
HGB BLD-MCNC: 11 G/DL (ref 11.7–15.7)

## 2017-08-15 PROCEDURE — 82950 GLUCOSE TEST: CPT | Performed by: OBSTETRICS & GYNECOLOGY

## 2017-08-15 PROCEDURE — 90471 IMMUNIZATION ADMIN: CPT

## 2017-08-15 PROCEDURE — 99207 ZZC PRENATAL VISIT: CPT | Performed by: OBSTETRICS & GYNECOLOGY

## 2017-08-15 PROCEDURE — 00000218 ZZHCL STATISTIC OBHBG - HEMOGLOBIN: Performed by: OBSTETRICS & GYNECOLOGY

## 2017-08-15 PROCEDURE — 36415 COLL VENOUS BLD VENIPUNCTURE: CPT | Performed by: OBSTETRICS & GYNECOLOGY

## 2017-08-15 PROCEDURE — 96372 THER/PROPH/DIAG INJ SC/IM: CPT

## 2017-08-15 PROCEDURE — 76815 OB US LIMITED FETUS(S): CPT | Performed by: OBSTETRICS & GYNECOLOGY

## 2017-08-15 PROCEDURE — 86850 RBC ANTIBODY SCREEN: CPT | Performed by: OBSTETRICS & GYNECOLOGY

## 2017-08-15 PROCEDURE — 90715 TDAP VACCINE 7 YRS/> IM: CPT

## 2017-08-15 NOTE — NURSING NOTE
Screening Questionnaire for Adult Immunization    Are you sick today?   No   Do you have allergies to medications, food, a vaccine component or latex?   No   Have you ever had a serious reaction after receiving a vaccination?   No   Do you have a long-term health problem with heart disease, lung disease, asthma, kidney disease, metabolic disease (e.g. diabetes), anemia, or other blood disorder?   No   Do you have cancer, leukemia, HIV/AIDS, or any other immune system problem?   No   In the past 3 months, have you taken medications that affect  your immune system, such as prednisone, other steroids, or anticancer drugs; drugs for the treatment of rheumatoid arthritis, Crohn s disease, or psoriasis; or have you had radiation treatments?   No   Have you had a seizure, or a brain or other nervous system problem?   No   During the past year, have you received a transfusion of blood or blood     products, or been given immune (gamma) globulin or antiviral drug?   No   For women: Are you pregnant or is there a chance you could become        pregnant during the next month?   Yes   Have you received any vaccinations in the past 4 weeks?   No     Immunization questionnaire answers were all negative.      MNVFC doesn't apply on this patient    Per orders of Dr. Lozano, injection of Tdap given by Marlena Rashid. Patient instructed to remain in clinic for 15 minutes afterwards, and to report any adverse reaction to me immediately.       Screening performed by Marlena Rashid on 8/15/2017 at 9:18 AM.    The following medication was given:     MEDICATION: RhoGam 300 ug  ROUTE: IM  SITE: Ventrogluteal - Right  DOSE: 300 ug  LOT #: [id number]  :  Jyotsna  EXPIRATION DATE:  2017  NDC#: 9324-2249-36

## 2017-08-15 NOTE — PROGRESS NOTES
Syphilis is a sexually transmitted disease that can cause birth defects in the babies of untreated mothers. Every pregnant patient is tested for syphilis early in each pregnancy as part of the routine lab work. The [place] Department of [facility] has seen an increase in the rate of syphilis in [place]. The [facility] now recommends testing for syphilis 3 times during a pregnancy, the new prenatal visit, 28 weeks and when admitted for delivery. Patient accepts lab testing for syphilis.

## 2017-08-15 NOTE — MR AVS SNAPSHOT
After Visit Summary   8/15/2017    An Maya    MRN: [id number]           Patient Information     Date Of Birth          1980        Visit Information        Provider Department      8/15/2017 8:00 AM WE LAB [facility]        Today's Diagnoses     Encounter for  screening of mother    -  1    Need for Tdap vaccination        Need for rhogam due to Rh negative mother           Follow-ups after your visit        Your next 10 appointments already scheduled     Aug 29, 2017  8:30 AM CDT   ESTABLISHED PRENATAL with Caryn Lozano MD   [facility] ([facility])    [address] [address]   [phone]            Sep 12, 2017  8:20 AM CDT   ESTABLISHED PRENATAL with Caryn Lozano MD   [facility] ([facility])    [address] [facility] [address]  [facility] [address]   [phone]              Who to contact     If you have questions or need follow up information about today's clinic visit or your schedule please contact [facility] directly at [phone].  Normal or non-critical lab and imaging results will be communicated to you by [employer]hart, letter or phone within 4 business days after the clinic has received the results. If you do not hear from us within 7 days, please contact the clinic through [employer] or phone. If you have a critical or abnormal lab result, we will notify you by phone as soon as possible.  Submit refill requests through [employer] or call your pharmacy and they will forward the refill request to us. Please allow 3 business days for your refill to be completed.          Additional Information About Your Visit        MyChart Information     [employer] lets you send messages to your doctor, view your test results, renew your prescriptions, schedule appointments and more. To sign up, go to [email or web address] .  "Click on \"Log in\" on the left side of the screen, which will take you to the Welcome page. Then click on \"Sign up Now\" on the right side of the page.     You will be asked to enter the access code listed below, as well as some personal information. Please follow the directions to create your username and password.     Your access code is: [email or web address]  Expires: 10/16/2017  9:49 AM     Your access code will  in 90 days. If you need help or a new code, please call your [place] clinic or [phone].        Care EveryWhere ID     This is your Care EveryWhere ID. This could be used by other organizations to access your [place] medical records  [id number]        Your Vitals Were     Last Period                   2017 (Approximate)            Blood Pressure from Last 3 Encounters:   08/15/17 98/70   17 100/60   17 100/60    Weight from Last 3 Encounters:   08/15/17 179 lb (81.2 kg)   17 173 lb (78.5 kg)   17 167 lb (75.8 kg)              We Performed the Following     Antibody screen red cell     Glucose tolerance gest screen 1 hour     OB hemoglobin        Primary Care Provider    None Specified       No primary provider on file.        Equal Access to Services     MYRANDA MARTINEZ : Asif ahno Fabi, waaxda luqadaha, qaybta kaalmada adedanyyada, mell zhao . So [facility] [phone].    ATENCIÓN: Si habla español, tiene a hsu disposición servicios gratuitos de asistencia lingüística. Llame al [phone].    We comply with applicable federal civil rights laws and [place] laws. We do not discriminate on the basis of race, color, national origin, age, disability sex, sexual orientation or gender identity.            Thank you!     Thank you for choosing [facility]  for your care. Our goal is always to provide you with excellent care. Hearing back from our patients is one way we can continue to improve our services. Please take " a few minutes to complete the written survey that you may receive in the mail after your visit with us. Thank you!             Your Updated Medication List - Protect others around you: Learn how to safely use, store and throw away your medicines at www.disposemymeds.org.          This list is accurate as of: 8/15/17  9:30 AM.  Always use your most recent med list.                   Brand Name Dispense Instructions for use Diagnosis    prenatal multivitamin plus iron 27-0.8 MG Tabs per tablet      Take 1 tablet by mouth daily

## 2017-08-15 NOTE — MR AVS SNAPSHOT
After Visit Summary   8/15/2017    An Maya    MRN: [id number]           Patient Information     Date Of Birth          1980        Visit Information        Provider Department      8/15/2017 9:00 AM Caryn Lozano MD [facility] [place] [facility]        Today's Diagnoses     Encounter for  screening of mother    -  1    Elderly primigravida, antepartum, third trimester           Follow-ups after your visit        Your next 10 appointments already scheduled     Aug 29, 2017  8:30 AM CDT   ESTABLISHED PRENATAL with Caryn Lozano MD   [facility] ([facility] [place])    [address] [place] [address]   [phone]            Sep 12, 2017  8:20 AM CDT   ESTABLISHED PRENATAL with Caryn Lozano MD   [facility] [place] ([facility])    [address] [place] [address] [address]  [facility] [address]   [phone]              Who to contact     If you have questions or need follow up information about today's clinic visit or your schedule please contact [facility] [place] directly at [phone].  Normal or non-critical lab and imaging results will be communicated to you by PolySpothart, letter or phone within 4 business days after the clinic has received the results. If you do not hear from us within 7 days, please contact the clinic through PolySpothart or phone. If you have a critical or abnormal lab result, we will notify you by phone as soon as possible.  Submit refill requests through [email or web address] or call your pharmacy and they will forward the refill request to us. Please allow 3 business days for your refill to be completed.          Additional Information About Your Visit        MyChart Information     [email or web address] lets you send messages to your doctor, view your test results, renew your prescriptions, schedule appointments and more. To sign up, go to [email or web address] . Click on  "\"Log in\" on the left side of the screen, which will take you to the Welcome page. Then click on \"Sign up Now\" on the right side of the page.     You will be asked to enter the access code listed below, as well as some personal information. Please follow the directions to create your username and password.     Your access code is: [email or web address]  Expires: 10/16/2017  9:49 AM     Your access code will  in 90 days. If you need help or a new code, please call your [place] clinic or [phone].        Care EveryWhere ID     This is your Care EveryWhere ID. This could be used by other organizations to access your [place] medical records  [id number]        Your Vitals Were     Last Period BMI (Body Mass Index)                2017 (Approximate) 29.33 kg/m2           Blood Pressure from Last 3 Encounters:   08/15/17 98/70   17 100/60   17 100/60    Weight from Last 3 Encounters:   08/15/17 179 lb (81.2 kg)   17 173 lb (78.5 kg)   17 167 lb (75.8 kg)              We Performed the Following     Anti Treponema        Primary Care Provider    None Specified       No primary provider on file.        Equal Access to Services     [facility] : Hadii ingris ku jimyo Soteeali, waaxda luqadaha, qaybta kaalmada adeegyada, waxay lisandro zhao . So [facility] [phone].    ATENCIÓN: Si habla español, tiene a hsu disposición servicios gratuitos de asistencia lingüística. Llame al [phone].    We comply with applicable federal civil rights laws and [place] laws. We do not discriminate on the basis of race, color, national origin, age, disability sex, sexual orientation or gender identity.            Thank you!     Thank you for choosing [facility]  for your care. Our goal is always to provide you with excellent care. Hearing back from our patients is one way we can continue to improve our services. Please take a few minutes to complete the written survey " that you may receive in the mail after your visit with us. Thank you!             Your Updated Medication List - Protect others around you: Learn how to safely use, store and throw away your medicines at www.disposemymeds.org.          This list is accurate as of: 8/15/17 10:05 AM.  Always use your most recent med list.                   Brand Name Dispense Instructions for use Diagnosis    prenatal multivitamin plus iron 27-0.8 MG Tabs per tablet      Take 1 tablet by mouth daily

## 2017-08-29 ENCOUNTER — PRENATAL OFFICE VISIT (OUTPATIENT)
Dept: OBGYN | Facility: CLINIC | Age: 37
End: 2017-08-29
Payer: COMMERCIAL

## 2017-08-29 VITALS — BODY MASS INDEX: 29.5 KG/M2 | WEIGHT: 180 LBS | DIASTOLIC BLOOD PRESSURE: 62 MMHG | SYSTOLIC BLOOD PRESSURE: 100 MMHG

## 2017-08-29 DIAGNOSIS — O09.513 ELDERLY PRIMIGRAVIDA, ANTEPARTUM, THIRD TRIMESTER: ICD-10-CM

## 2017-08-29 PROCEDURE — 99207 ZZC PRENATAL VISIT: CPT | Performed by: OBSTETRICS & GYNECOLOGY

## 2017-08-29 NOTE — MR AVS SNAPSHOT
"              After Visit Summary   8/29/2017    An Maya    MRN: [id number]           Patient Information     Date Of Birth          1980        Visit Information        Provider Department      8/29/2017 8:30 AM Caryn Lozano MD [facility]        Today's Diagnoses     Elderly primigravida, antepartum, third trimester           Follow-ups after your visit        Your next 10 appointments already scheduled     Sep 12, 2017  8:20 AM CDT   ESTABLISHED PRENATAL with Caryn Lozano MD   [facility] ([facility])    [address] [place] [address]   [phone]              Who to contact     If you have questions or need follow up information about today's clinic visit or your schedule please contact [facility] directly at [phone].  Normal or non-critical lab and imaging results will be communicated to you by MyChart, letter or phone within 4 business days after the clinic has received the results. If you do not hear from us within 7 days, please contact the clinic through MyChart or phone. If you have a critical or abnormal lab result, we will notify you by phone as soon as possible.  Submit refill requests through Internet Broadcasting or call your pharmacy and they will forward the refill request to us. Please allow 3 business days for your refill to be completed.          Additional Information About Your Visit        MyChart Information     Internet Broadcasting lets you send messages to your doctor, view your test results, renew your prescriptions, schedule appointments and more. To sign up, go to [email or web address] . Click on \"Log in\" on the left side of the screen, which will take you to the Welcome page. Then click on \"Sign up Now\" on the right side of the page.     You will be asked to enter the access code listed below, as well as some personal information. Please follow the directions to create your " username and password.     Your access code is: [email or web address]  Expires: 10/16/2017  9:49 AM     Your access code will  in 90 days. If you need help or a new code, please call your [place] clinic or [phone].        Care EveryWhere ID     This is your Care EveryWhere ID. This could be used by other organizations to access your [place] medical records  [id number]        Your Vitals Were     Last Period BMI (Body Mass Index)                2017 (Approximate) 29.5 kg/m2           Blood Pressure from Last 3 Encounters:   17 100/62   08/15/17 98/70   17 100/60    Weight from Last 3 Encounters:   17 180 lb (81.6 kg)   08/15/17 179 lb (81.2 kg)   17 173 lb (78.5 kg)              Today, you had the following     No orders found for display       Primary Care Provider    None Specified       No primary provider on file.        Equal Access to Services     [facility] : Hadii ingris rodriguez hadasho Soomaali, waaxda luqadaha, qaybta kaalmada adeegyada, waxay lisandro zhao . So [facility] [phone].    ATENCIÓN: Si habla español, tiene a hsu disposición servicios gratuitos de asistencia lingüística. Llame al [phone].    We comply with applicable federal civil rights laws and [place] laws. We do not discriminate on the basis of race, color, national origin, age, disability sex, sexual orientation or gender identity.            Thank you!     Thank you for choosing [facility]  for your care. Our goal is always to provide you with excellent care. Hearing back from our patients is one way we can continue to improve our services. Please take a few minutes to complete the written survey that you may receive in the mail after your visit with us. Thank you!             Your Updated Medication List - Protect others around you: Learn how to safely use, store and throw away your medicines at www.disposemymeds.org.          This list is accurate as of: 17   9:00 AM.  Always use your most recent med list.                   Brand Name Dispense Instructions for use Diagnosis    prenatal multivitamin plus iron 27-0.8 MG Tabs per tablet      Take 1 tablet by mouth daily

## 2017-09-12 ENCOUNTER — PRENATAL OFFICE VISIT (OUTPATIENT)
Dept: OBGYN | Facility: CLINIC | Age: 37
End: 2017-09-12
Payer: COMMERCIAL

## 2017-09-12 VITALS — BODY MASS INDEX: 30.15 KG/M2 | SYSTOLIC BLOOD PRESSURE: 110 MMHG | DIASTOLIC BLOOD PRESSURE: 68 MMHG | WEIGHT: 184 LBS

## 2017-09-12 DIAGNOSIS — O09.513 ELDERLY PRIMIGRAVIDA, ANTEPARTUM, THIRD TRIMESTER: Primary | ICD-10-CM

## 2017-09-12 PROCEDURE — 99207 ZZC PRENATAL VISIT: CPT | Performed by: OBSTETRICS & GYNECOLOGY

## 2017-09-12 NOTE — MR AVS SNAPSHOT
"              After Visit Summary   9/12/2017    An Maya    MRN: [id number]           Patient Information     Date Of Birth          1980        Visit Information        Provider Department      9/12/2017 8:20 AM Caryn Lozano MD [facility] [place]        Today's Diagnoses     Elderly primigravida, antepartum, third trimester    -  1       Follow-ups after your visit        Your next 10 appointments already scheduled     Sep 26, 2017  8:20 AM CDT   ESTABLISHED PRENATAL with Caryn Lozano MD   [facility] [place] ([facility] [place])    [address]  [facility] [address]   [phone]            Oct 10, 2017  8:20 AM CDT   ESTABLISHED PRENATAL with Caryn Lozano MD   [facility] [place] ([facility] [place])    [address]  [facility] [address]   [phone]              Who to contact     If you have questions or need follow up information about today's clinic visit or your schedule please contact [facility] [place] directly at [phone].  Normal or non-critical lab and imaging results will be communicated to you by Capture Educational Consulting Serviceshart, letter or phone within 4 business days after the clinic has received the results. If you do not hear from us within 7 days, please contact the clinic through The IQ Collectivet or phone. If you have a critical or abnormal lab result, we will notify you by phone as soon as possible.  Submit refill requests through Aliopartis or call your pharmacy and they will forward the refill request to us. Please allow 3 business days for your refill to be completed.          Additional Information About Your Visit        MyChart Information     Aliopartis lets you send messages to your doctor, view your test results, renew your prescriptions, schedule appointments and more. To sign up, go to [email or web address] . Click on \"Log in\" on the left side of the screen, which will " "take you to the Welcome page. Then click on \"Sign up Now\" on the right side of the page.     You will be asked to enter the access code listed below, as well as some personal information. Please follow the directions to create your username and password.     Your access code is: [email or web address]  Expires: 10/16/2017  9:49 AM     Your access code will  in 90 days. If you need help or a new code, please call your [place] clinic or [phone].        Care EveryWhere ID     This is your Care EveryWhere ID. This could be used by other organizations to access your [place] medical records  [id number]        Your Vitals Were     Last Period BMI (Body Mass Index)                2017 (Approximate) 30.15 kg/m2           Blood Pressure from Last 3 Encounters:   17 110/68   17 100/62   08/15/17 98/70    Weight from Last 3 Encounters:   17 184 lb (83.5 kg)   17 180 lb (81.6 kg)   08/15/17 179 lb (81.2 kg)              Today, you had the following     No orders found for display       Primary Care Provider    None Specified       No primary provider on file.        Equal Access to Services     MYRANDA MARTINEZ : sAif Dun[place], dwaine carrera, qachace kaalmada nery, mell zhao . So [facility] [phone].    ATENCIÓN: Si habla español, tiene a hsu disposición servicios gratuitos de asistencia lingüística. Llame al [phone].    We comply with applicable federal civil rights laws and [place] laws. We do not discriminate on the basis of race, color, national origin, age, disability sex, sexual orientation or gender identity.            Thank you!     Thank you for choosing [facility]  for your care. Our goal is always to provide you with excellent care. Hearing back from our patients is one way we can continue to improve our services. Please take a few minutes to complete the written survey that you may receive in the mail after " your visit with us. Thank you!             Your Updated Medication List - Protect others around you: Learn how to safely use, store and throw away your medicines at www.disposemymeds.org.          This list is accurate as of: 9/12/17  9:04 AM.  Always use your most recent med list.                   Brand Name Dispense Instructions for use Diagnosis    prenatal multivitamin plus iron 27-0.8 MG Tabs per tablet      Take 1 tablet by mouth daily

## 2017-09-26 ENCOUNTER — PRENATAL OFFICE VISIT (OUTPATIENT)
Dept: OBGYN | Facility: CLINIC | Age: 37
End: 2017-09-26
Payer: COMMERCIAL

## 2017-09-26 VITALS — WEIGHT: 186 LBS | DIASTOLIC BLOOD PRESSURE: 70 MMHG | SYSTOLIC BLOOD PRESSURE: 106 MMHG | BODY MASS INDEX: 30.48 KG/M2

## 2017-09-26 DIAGNOSIS — O36.63X0 LARGE FOR DATES FETUS AFFECTING PREGNANCY IN THIRD TRIMESTER, ANTEPARTUM: ICD-10-CM

## 2017-09-26 DIAGNOSIS — Z23 NEED FOR PROPHYLACTIC VACCINATION AND INOCULATION AGAINST INFLUENZA: ICD-10-CM

## 2017-09-26 DIAGNOSIS — O09.513 ELDERLY PRIMIGRAVIDA, ANTEPARTUM, THIRD TRIMESTER: Primary | ICD-10-CM

## 2017-09-26 PROCEDURE — 90471 IMMUNIZATION ADMIN: CPT | Performed by: OBSTETRICS & GYNECOLOGY

## 2017-09-26 PROCEDURE — 90686 IIV4 VACC NO PRSV 0.5 ML IM: CPT | Performed by: OBSTETRICS & GYNECOLOGY

## 2017-09-26 PROCEDURE — 99207 ZZC PRENATAL VISIT: CPT | Performed by: OBSTETRICS & GYNECOLOGY

## 2017-09-26 NOTE — PROGRESS NOTES
Injectable Influenza Immunization Documentation    1.  Is the person to be vaccinated sick today?   No    2. Does the person to be vaccinated have an allergy to a component   of the vaccine?   No    3. Has the person to be vaccinated ever had a serious reaction   to influenza vaccine in the past?   No    4. Has the person to be vaccinated ever had Guillain-Barré syndrome?   No    Form completed by Agata Boone [facility]

## 2017-09-26 NOTE — MR AVS SNAPSHOT
After Visit Summary   9/26/2017    An Maya    MRN: [id number]           Patient Information     Date Of Birth          1980        Visit Information        Provider Department      9/26/2017 8:20 AM Caryn Lozano MD [facility] [place]        Today's Diagnoses     Elderly primigravida, antepartum, third trimester    -  1    Need for prophylactic vaccination and inoculation against influenza        Large for dates fetus affecting pregnancy in third trimester, antepartum           Follow-ups after your visit        Your next 10 appointments already scheduled     Oct 10, 2017 10:40 AM CDT   US PELVIC COMPLETE W TRANSVAGINAL with [email or web address]   [facility] [place] ([facility] [place])    [address] [address] [phone]           Please bring a list of your medicines (including vitamins, minerals and over-the-counter drugs). Also, tell your doctor about any allergies you may have. Wear comfortable clothes and leave your valuables at home.  Adults: Drink six 8-ounce glasses of fluid one hour before your exam. Do NOT empty your bladder.  If you need to empty your bladder before your exam, try to release only a little bit of urine. Then, drink another 8oz glass of fluid.  Children: Children who are potty trained should drink at least 4 cups (32 oz) of liquid 45 minutes to one hour prior to the exam. The child s bladder must be full in order to achieve a diagnostic exam. If your child is very uncomfortable or has an urgent need to pee, please notify a technologist; they will try to find out how much longer the wait may be and provide instructions to help relieve the pressure. Occasionally it is medically necessary to insert a urinary catheter to fill the bladder.  Please call the Imaging Department at your exam site with any questions.            Oct 10, 2017 11:20 AM CDT   ESTABLISHED PRENATAL with Caryn Lozano MD  "  [facility] ([facility]    [address] [place] [id number] [phone]              Future tests that were ordered for you today     Open Future Orders        Priority Expected Expires Ordered    US OB Ltd One Or More Fetus FU/Repeat Routine  2018            Who to contact     If you have questions or need follow up information about today's clinic visit or your schedule please contact [facility] directly at [phone].  Normal or non-critical lab and imaging results will be communicated to you by Contribhart, letter or phone within 4 business days after the clinic has received the results. If you do not hear from us within 7 days, please contact the clinic through Marketing Technology Conceptst or phone. If you have a critical or abnormal lab result, we will notify you by phone as soon as possible.  Submit refill requests through Cleeng or call your pharmacy and they will forward the refill request to us. Please allow 3 business days for your refill to be completed.          Additional Information About Your Visit        Cleeng Information     Cleeng lets you send messages to your doctor, view your test results, renew your prescriptions, schedule appointments and more. To sign up, go to [email or web address] . Click on \"Log in\" on the left side of the screen, which will take you to the Welcome page. Then click on \"Sign up Now\" on the right side of the page.     You will be asked to enter the access code listed below, as well as some personal information. Please follow the directions to create your username and password.     Your access code is: [email or web address]  Expires: 10/16/2017  9:49 AM     Your access code will  in 90 days. If you need help or a new code, please call your [place] clinic or [phone].        Care EveryWhere ID     This is your Care EveryWhere ID. This could be used by other organizations to access your " [place] medical records  [id number]        Your Vitals Were     Last Period BMI (Body Mass Index)                01/30/2017 (Approximate) 30.48 kg/m2           Blood Pressure from Last 3 Encounters:   09/26/17 106/70   09/12/17 110/68   08/29/17 100/62    Weight from Last 3 Encounters:   09/26/17 186 lb (84.4 kg)   09/12/17 184 lb (83.5 kg)   08/29/17 180 lb (81.6 kg)              We Performed the Following     FLU VAC, SPLIT VIRUS IM > 3 YO (QUADRIVALENT) [29853]     Vaccine Administration, Initial [25678]        Primary Care Provider    None Specified       No primary provider on file.        Equal Access to Services     MYRANDA MARTINEZ : Asif [place], dwaine carrera, angy gabriel, mell arambula. So [facility] [place] [facility] [phone].    ATENCIÓN: Si habla español, tiene a hsu disposición servicios gratuitos de asistencia lingüística. Llame al [phone].    We comply with applicable federal civil rights laws and [place] laws. We do not discriminate on the basis of race, color, national origin, age, disability sex, sexual orientation or gender identity.            Thank you!     Thank you for choosing [facility]  for your care. Our goal is always to provide you with excellent care. Hearing back from our patients is one way we can continue to improve our services. Please take a few minutes to complete the written survey that you may receive in the mail after your visit with us. Thank you!             Your Updated Medication List - Protect others around you: Learn how to safely use, store and throw away your medicines at www.disposemymeds.org.          This list is accurate as of: 9/26/17  9:08 AM.  Always use your most recent med list.                   Brand Name Dispense Instructions for use Diagnosis    prenatal multivitamin plus iron 27-0.8 MG Tabs per tablet      Take 1 tablet by mouth daily

## 2017-10-02 ENCOUNTER — HOSPITAL ENCOUNTER (OUTPATIENT)
Facility: CLINIC | Age: 37
Discharge: HOME | End: 2017-10-02
Attending: OBSTETRICS & GYNECOLOGY | Admitting: OBSTETRICS & GYNECOLOGY
Payer: COMMERCIAL

## 2017-10-02 ENCOUNTER — HOSPITAL ENCOUNTER (OUTPATIENT)
Facility: CLINIC | Age: 37
End: 2017-10-02
Admitting: OBSTETRICS & GYNECOLOGY
Payer: COMMERCIAL

## 2017-10-02 ENCOUNTER — TELEPHONE (OUTPATIENT)
Dept: OBGYN | Facility: CLINIC | Age: 37
End: 2017-10-02

## 2017-10-02 VITALS — SYSTOLIC BLOOD PRESSURE: 124 MMHG | RESPIRATION RATE: 16 BRPM | TEMPERATURE: 98.1 F | DIASTOLIC BLOOD PRESSURE: 84 MMHG

## 2017-10-02 LAB — A1 MICROGLOB PLACENTAL VAG QL: NEGATIVE

## 2017-10-02 PROCEDURE — 84112 EVAL AMNIOTIC FLUID PROTEIN: CPT | Performed by: OBSTETRICS & GYNECOLOGY

## 2017-10-02 PROCEDURE — 59025 FETAL NON-STRESS TEST: CPT | Mod: 26 | Performed by: OBSTETRICS & GYNECOLOGY

## 2017-10-02 PROCEDURE — 99214 OFFICE O/P EST MOD 30 MIN: CPT | Mod: 25

## 2017-10-02 PROCEDURE — 59025 FETAL NON-STRESS TEST: CPT

## 2017-10-02 NOTE — TELEPHONE ENCOUNTER
35w0d, CASIE 11/6/17. Pt is having discharge. Pt states happened last night. Pt states no smell or blood. Today discharge had it happen couple times today. Small amount. No contractions, no bleeding. FM present. Pt states stood up there is no discharge. Pt stated that when she woke from nap fluid did run . Informed pt to go to [facility] to R/O SROM. [facility] informed. Dr. Narvaez informed.

## 2017-10-02 NOTE — IP AVS SNAPSHOT
[address], [address]    [facility] [address]    Phone:  [phone]                                       After Visit Summary   10/2/2017    An Maya    MRN: [id number]           After Visit Summary Signature Page     I have received my discharge instructions, and my questions have been answered. I have discussed any challenges I see with this plan with the nurse or doctor.    ..........................................................................................................................................  Patient/Patient Representative Signature      ..........................................................................................................................................  Patient Representative Print Name and Relationship to Patient    ..................................................               ................................................  Date                                            Time    ..........................................................................................................................................  Reviewed by Signature/Title    ...................................................              ..............................................  Date                                                            Time

## 2017-10-02 NOTE — IP AVS SNAPSHOT
MRN:[id number]                      After Visit Summary   10/2/2017    An Maya    MRN: [id number]           Thank you!     Thank you for choosing [facility] for your care. Our goal is always to provide you with excellent care. Hearing back from our patients is one way we can continue to improve our services. Please take a few minutes to complete the written survey that you may receive in the mail after you visit with us. Thank you!        Patient Information     Date Of Birth          1980        About your hospital stay     You were admitted on:  October 2, 2017 You last received care in the:  [facility]    You were discharged on:  October 2, 2017       Who to Call     For medical emergencies, please call 911.  For non-urgent questions about your medical care, please call your primary care provider or clinic, None          Attending Provider     Provider Specialty    Minnie Narvaez MD OB/Gyn       Primary Care Provider    None Specified      Your next 10 appointments already scheduled     Oct 10, 2017 10:40 AM CDT   US PELVIC COMPLETE W TRANSVAGINAL with WEUS1   [facility] [place] ([facility])    [address] [place] [id number]   [phone]           Please bring a list of your medicines (including vitamins, minerals and over-the-counter drugs). Also, tell your doctor about any allergies you may have. Wear comfortable clothes and leave your valuables at home.  Adults: Drink six 8-ounce glasses of fluid one hour before your exam. Do NOT empty your bladder.  If you need to empty your bladder before your exam, try to release only a little bit of urine. Then, drink another 8oz glass of fluid.  Children: Children who are potty trained should drink at least 4 cups (32 oz) of liquid 45 minutes to one hour prior to the exam. The child s bladder must be full in order to achieve a diagnostic exam. If your child is very  uncomfortable or has an urgent need to pee, please notify a technologist; they will try to find out how much longer the wait may be and provide instructions to help relieve the pressure. Occasionally it is medically necessary to insert a urinary catheter to fill the bladder.  Please call the Imaging Department at your exam site with any questions.            Oct 10, 2017 11:20 AM CDT   ESTABLISHED PRENATAL with Caryn Lozano MD   [facility] [place] ([facility])    [address] [place] [id number] [phone]              Further instructions from your care team       Discharge Instructions for Undelivered Patients      You were seen for: Membrane Assessment  We Consulted: Dr. Narvaez  You had (Test or Medicine): Amnisure, NST     Diet:   Drink 8 to 12 glasses of liquids (milk, juice, water) every day.  You may eat meals and snacks.     Activity:  Count fetal kicks everyday (see handout)  Call your doctor or nurse midwife if your baby is moving less than usual.     Call your provider if you notice:  Swelling in your face or increased swelling in your hands or legs.  Headaches that are not relieved by Tylenol (acetaminophen).  Changes in your vision (blurring: seeing spots or stars.)  Nausea (sick to your stomach) and vomiting (throwing up).   Weight gain of 5 pounds or more per week.  Heartburn that doesn't go away.  Signs of bladder infection: pain when you urinate (use the toilet), need to go more often and more urgently.  The bag of little (rupture of membranes) breaks, or you notice leaking in your underwear.  Bright red blood in your underwear.  Abdominal (lower belly) or stomach pain.  For first baby: Contractions (tightening) less than 5 minutes apart for one hour or more.  Second (plus) baby: Contractions (tightening) less than 10 minutes apart and getting stronger.  *If less than 34 weeks: Contractions (tightenings) more than 6 times in one  "hour.  Increase or change in vaginal discharge (note the color and amount)    Follow-up:  As scheduled in the clinic          Pending Results     No orders found from 2017 to 10/3/2017.            Admission Information     Date & Time Provider Department Dept. Phone    10/2/2017 Minnie Nravaez MD [place] [facility] [phone]      Your Vitals Were     Blood Pressure Temperature Respirations Last Period          124/84 98.1  F (36.7  C) (Temporal) 16 2017 (Approximate)        MyChart Information     EzLike lets you send messages to your doctor, view your test results, renew your prescriptions, schedule appointments and more. To sign up, go to [email or web address] . Click on \"Log in\" on the left side of the screen, which will take you to the Welcome page. Then click on \"Sign up Now\" on the right side of the page.     You will be asked to enter the access code listed below, as well as some personal information. Please follow the directions to create your username and password.     Your access code is: [email or web address]  Expires: 10/16/2017  9:49 AM     Your access code will  in 90 days. If you need help or a new code, please call your [place] clinic or [phone].        Care EveryWhere ID     This is your Care EveryWhere ID. This could be used by other organizations to access your [place] medical records  [id number]        Equal Access to Services     [facility]: Hadii ingris rodriguez hadasho Soteeali, waaxda luqadaha, qaybta kaalmada adeegyada, mell zhao . So [facility] [phone].    ATENCIÓN: Si habla español, tiene a hsu disposición servicios gratuitos de asistencia lingüística. Llame al [phone].    We comply with applicable federal civil rights laws and [place] laws. We do not discriminate on the basis of race, color, national origin, age, disability, sex, sexual orientation, or gender identity.               Review of your medicines      UNREVIEWED " medicines. Ask your doctor about these medicines        Dose / Directions    prenatal multivitamin plus iron 27-0.8 MG Tabs per tablet        Dose:  1 tablet   Take 1 tablet by mouth daily   Refills:  0                Protect others around you: Learn how to safely use, store and throw away your medicines at www.disposemymeds.org.             Medication List: This is a list of all your medications and when to take them. Check marks below indicate your daily home schedule. Keep this list as a reference.      Medications           Morning Afternoon Evening Bedtime As Needed    prenatal multivitamin plus iron 27-0.8 MG Tabs per tablet   Take 1 tablet by mouth daily

## 2017-10-03 NOTE — PROGRESS NOTES
36 y.o  presented to [facility] for r/o PROM. NST with baseline 135 then 130 with good LTV and reactive. Cat 1. No ctx. D/c'd home

## 2017-10-03 NOTE — DISCHARGE INSTRUCTIONS
Discharge Instructions for Undelivered Patients      You were seen for: Membrane Assessment  We Consulted: Dr. Narvaez  You had (Test or Medicine): Amnisure, NST     Diet:   Drink 8 to 12 glasses of liquids (milk, juice, water) every day.  You may eat meals and snacks.     Activity:  Count fetal kicks everyday (see handout)  Call your doctor or nurse midwife if your baby is moving less than usual.     Call your provider if you notice:  Swelling in your face or increased swelling in your hands or legs.  Headaches that are not relieved by Tylenol (acetaminophen).  Changes in your vision (blurring: seeing spots or stars.)  Nausea (sick to your stomach) and vomiting (throwing up).   Weight gain of 5 pounds or more per week.  Heartburn that doesn't go away.  Signs of bladder infection: pain when you urinate (use the toilet), need to go more often and more urgently.  The bag of little (rupture of membranes) breaks, or you notice leaking in your underwear.  Bright red blood in your underwear.  Abdominal (lower belly) or stomach pain.  For first baby: Contractions (tightening) less than 5 minutes apart for one hour or more.  Second (plus) baby: Contractions (tightening) less than 10 minutes apart and getting stronger.  *If less than 34 weeks: Contractions (tightenings) more than 6 times in one hour.  Increase or change in vaginal discharge (note the color and amount)    Follow-up:  As scheduled in the clinic

## 2017-10-03 NOTE — PLAN OF CARE
Patient presents to [facility] at 35w0d  noting that she has had 3 episodes of having a little wet in her underwear since 2200 last night.  She denies vaginal bleeding & ctxs, notes +FM.  She consents to fetal & uterine monitoring and Amnisure collection.      No fluid noted on perineum.  Amnisure negative.  Spoke to Dr. Narvaez on phone; update given on neg Amnisure, reactive NST, and status.  Discharge order received.      D/C instructions printed & explained to pt - questions answered & she states understanding.  F/U with Dr. Lozano at scheduled appt next week.  D/C to home undelivered and ambulatory.

## 2017-10-10 ENCOUNTER — PRENATAL OFFICE VISIT (OUTPATIENT)
Dept: OBGYN | Facility: CLINIC | Age: 37
End: 2017-10-10
Payer: COMMERCIAL

## 2017-10-10 ENCOUNTER — RADIANT APPOINTMENT (OUTPATIENT)
Dept: ULTRASOUND IMAGING | Facility: CLINIC | Age: 37
End: 2017-10-10
Payer: COMMERCIAL

## 2017-10-10 VITALS — DIASTOLIC BLOOD PRESSURE: 70 MMHG | BODY MASS INDEX: 31.63 KG/M2 | WEIGHT: 193 LBS | SYSTOLIC BLOOD PRESSURE: 114 MMHG

## 2017-10-10 DIAGNOSIS — Z3A.36 36 WEEKS GESTATION OF PREGNANCY: ICD-10-CM

## 2017-10-10 DIAGNOSIS — O09.519 ELDERLY PRIMIGRAVIDA, ANTEPARTUM: Primary | ICD-10-CM

## 2017-10-10 DIAGNOSIS — O36.63X0 LARGE FOR DATES FETUS AFFECTING PREGNANCY IN THIRD TRIMESTER, ANTEPARTUM: ICD-10-CM

## 2017-10-10 DIAGNOSIS — Z36.9 ENCOUNTER FOR ANTENATAL SCREENING OF MOTHER: ICD-10-CM

## 2017-10-10 PROCEDURE — 76816 OB US FOLLOW-UP PER FETUS: CPT | Performed by: OBSTETRICS & GYNECOLOGY

## 2017-10-10 PROCEDURE — 99207 ZZC PRENATAL VISIT: CPT | Performed by: OBSTETRICS & GYNECOLOGY

## 2017-10-10 PROCEDURE — 87653 STREP B DNA AMP PROBE: CPT | Performed by: OBSTETRICS & GYNECOLOGY

## 2017-10-10 NOTE — PROGRESS NOTES
OB visit  Doing well. No further concerned for ruptured membranes. Good fetal movement. No feeling contractions. No vaginal bleeding    Sonogram reviewed. Normal RAIMUNDO. Cephalic presentation. Normal growth.    SVE: closed/long/-1  GBS collected    35 yo G1 at 36+1wga here for routine prenatal visit  GBS today  Normal growth sonogram.  S/P TDAP and Rhogam    Follow up in 1 week with Dr. Marta Worley MD

## 2017-10-10 NOTE — MR AVS SNAPSHOT
"              After Visit Summary   10/10/2017    An Maya    MRN: [id number]           Patient Information     Date Of Birth          1980        Visit Information        Provider Department      10/10/2017 11:20 AM Nancy Worley MD [facility] [place]        Today's Diagnoses     Elderly primigravida, antepartum    -  1    Encounter for  screening of mother        36 weeks gestation of pregnancy           Follow-ups after your visit        Follow-up notes from your care team     Return in about 1 week (around 10/17/2017).      Your next 10 appointments already scheduled     Oct 10, 2017 11:20 AM CDT   ESTABLISHED PRENATAL with Nancy Worley MD   [facility] [place] ([facility] [place])    [address] [place] [address] [phone]              Who to contact     If you have questions or need follow up information about today's clinic visit or your schedule please contact [facility] directly at [phone].  Normal or non-critical lab and imaging results will be communicated to you by Interanahart, letter or phone within 4 business days after the clinic has received the results. If you do not hear from us within 7 days, please contact the clinic through Interanahart or phone. If you have a critical or abnormal lab result, we will notify you by phone as soon as possible.  Submit refill requests through KidNimble or call your pharmacy and they will forward the refill request to us. Please allow 3 business days for your refill to be completed.          Additional Information About Your Visit        Interanahart Information     KidNimble lets you send messages to your doctor, view your test results, renew your prescriptions, schedule appointments and more. To sign up, go to [email or web address] . Click on \"Log in\" on the left side of the screen, which will take you to the Welcome page. Then click " "on \"Sign up Now\" on the right side of the page.     You will be asked to enter the access code listed below, as well as some personal information. Please follow the directions to create your username and password.     Your access code is: [email or web address]  Expires: 10/16/2017  9:49 AM     Your access code will  in 90 days. If you need help or a new code, please call your [place] clinic or [phone].        Care EveryWhere ID     This is your Care EveryWhere ID. This could be used by other organizations to access your [place] medical records  [id number]        Your Vitals Were     Last Period BMI (Body Mass Index)                2017 (Approximate) 31.63 kg/m2           Blood Pressure from Last 3 Encounters:   10/10/17 114/70   10/02/17 124/84   17 106/70    Weight from Last 3 Encounters:   10/10/17 193 lb (87.5 kg)   17 186 lb (84.4 kg)   17 184 lb (83.5 kg)              We Performed the Following     Group B strep PCR        Primary Care Provider    None Specified       No primary provider on file.        Equal Access to Services     [facility]: Hadii ingris Dunlap, dwaine carrera, qaybta kaalmada nery, mell zhao . So [facility] [phone].    ATENCIÓN: Si habla español, tiene a hsu disposición servicios gratuitos de asistencia lingüística. Llame al [phone].    We comply with applicable federal civil rights laws and [place] laws. We do not discriminate on the basis of race, color, national origin, age, disability, sex, sexual orientation, or gender identity.            Thank you!     Thank you for choosing [facility]  for your care. Our goal is always to provide you with excellent care. Hearing back from our patients is one way we can continue to improve our services. Please take a few minutes to complete the written survey that you may receive in the mail after your visit with us. Thank you!             Your " Updated Medication List - Protect others around you: Learn how to safely use, store and throw away your medicines at www.disposemymeds.org.          This list is accurate as of: 10/10/17 11:06 AM.  Always use your most recent med list.                   Brand Name Dispense Instructions for use Diagnosis    prenatal multivitamin plus iron 27-0.8 MG Tabs per tablet      Take 1 tablet by mouth daily

## 2017-10-11 LAB
GP B STREP DNA SPEC QL NAA+PROBE: NEGATIVE
SPECIMEN SOURCE: NORMAL

## 2017-10-17 ENCOUNTER — PRENATAL OFFICE VISIT (OUTPATIENT)
Dept: OBGYN | Facility: CLINIC | Age: 37
End: 2017-10-17
Payer: COMMERCIAL

## 2017-10-17 VITALS — SYSTOLIC BLOOD PRESSURE: 122 MMHG | DIASTOLIC BLOOD PRESSURE: 70 MMHG | BODY MASS INDEX: 32.28 KG/M2 | WEIGHT: 197 LBS

## 2017-10-17 DIAGNOSIS — Z13.0 SCREENING, ANEMIA, DEFICIENCY, IRON: Primary | ICD-10-CM

## 2017-10-17 DIAGNOSIS — O09.519 ELDERLY PRIMIGRAVIDA, ANTEPARTUM: ICD-10-CM

## 2017-10-17 LAB
BASOPHILS # BLD AUTO: 0 10E9/L (ref 0–0.2)
BASOPHILS NFR BLD AUTO: 0.2 %
DIFFERENTIAL METHOD BLD: ABNORMAL
EOSINOPHIL # BLD AUTO: 0.1 10E9/L (ref 0–0.7)
EOSINOPHIL NFR BLD AUTO: 0.5 %
ERYTHROCYTE [DISTWIDTH] IN BLOOD BY AUTOMATED COUNT: 13.7 % (ref 10–15)
HCT VFR BLD AUTO: 34.4 % (ref 35–47)
HGB BLD-MCNC: 11.5 G/DL (ref 11.7–15.7)
LYMPHOCYTES # BLD AUTO: 2.9 10E9/L (ref 0.8–5.3)
LYMPHOCYTES NFR BLD AUTO: 24.6 %
MCH RBC QN AUTO: 31.1 PG (ref 26.5–33)
MCHC RBC AUTO-ENTMCNC: 33.4 G/DL (ref 31.5–36.5)
MCV RBC AUTO: 93 FL (ref 78–100)
MONOCYTES # BLD AUTO: 1.2 10E9/L (ref 0–1.3)
MONOCYTES NFR BLD AUTO: 10.3 %
NEUTROPHILS # BLD AUTO: 7.6 10E9/L (ref 1.6–8.3)
NEUTROPHILS NFR BLD AUTO: 64.4 %
PLATELET # BLD AUTO: 337 10E9/L (ref 150–450)
RBC # BLD AUTO: 3.7 10E12/L (ref 3.8–5.2)
WBC # BLD AUTO: 11.7 10E9/L (ref 4–11)

## 2017-10-17 PROCEDURE — 85025 COMPLETE CBC W/AUTO DIFF WBC: CPT | Performed by: OBSTETRICS & GYNECOLOGY

## 2017-10-17 PROCEDURE — 36415 COLL VENOUS BLD VENIPUNCTURE: CPT | Performed by: OBSTETRICS & GYNECOLOGY

## 2017-10-17 PROCEDURE — 99207 ZZC PRENATAL VISIT: CPT | Performed by: OBSTETRICS & GYNECOLOGY

## 2017-10-17 NOTE — MR AVS SNAPSHOT
"              After Visit Summary   10/17/2017    An Maya    MRN: [id number]           Patient Information     Date Of Birth          1980        Visit Information        Provider Department      10/17/2017 9:10 AM Caryn Lozano MD [facility]        Today's Diagnoses     Screening, anemia, deficiency, iron    -  1    Elderly primigravida, antepartum           Follow-ups after your visit        Your next 10 appointments already scheduled     Oct 31, 2017  9:30 AM CDT   ESTABLISHED PRENATAL with Caryn Lozano MD   [facility] ([facility]    [address] [place] [id number] [phone]              Who to contact     If you have questions or need follow up information about today's clinic visit or your schedule please contact [facility] directly at [phone].  Normal or non-critical lab and imaging results will be communicated to you by MyChart, letter or phone within 4 business days after the clinic has received the results. If you do not hear from us within 7 days, please contact the clinic through MyChart or phone. If you have a critical or abnormal lab result, we will notify you by phone as soon as possible.  Submit refill requests through Morning Tec or call your pharmacy and they will forward the refill request to us. Please allow 3 business days for your refill to be completed.          Additional Information About Your Visit        MyChart Information     Morning Tec lets you send messages to your doctor, view your test results, renew your prescriptions, schedule appointments and more. To sign up, go to [email or web address] . Click on \"Log in\" on the left side of the screen, which will take you to the Welcome page. Then click on \"Sign up Now\" on the right side of the page.     You will be asked to enter the access code listed below, as well as some personal information. Please " follow the directions to create your username and password.     Your access code is: [email or web address]  Expires: 1/15/2018  9:47 AM     Your access code will  in 90 days. If you need help or a new code, please call your [place] clinic or [phone].        Care EveryWhere ID     This is your Care EveryWhere ID. This could be used by other organizations to access your [place] medical records  [id number]        Your Vitals Were     Last Period BMI (Body Mass Index)                2017 (Approximate) 32.28 kg/m2           Blood Pressure from Last 3 Encounters:   10/17/17 122/70   10/10/17 114/70   10/02/17 124/84    Weight from Last 3 Encounters:   10/17/17 197 lb (89.4 kg)   10/10/17 193 lb (87.5 kg)   17 186 lb (84.4 kg)              We Performed the Following     CBC with platelets differential        Primary Care Provider    None Specified       No primary provider on file.        Equal Access to Services     [facility]: Hadii ingris ku hadasho Soteeali, waaxda luqadaha, qaybta kaalmada adeegyada, mell zhao . So [facility] [phone].    ATENCIÓN: Si habla español, tiene a hsu disposición servicios gratuitos de asistencia lingüística. Llame al [phone].    We comply with applicable federal civil rights laws and [place] laws. We do not discriminate on the basis of race, color, national origin, age, disability, sex, sexual orientation, or gender identity.            Thank you!     Thank you for choosing [facility]  for your care. Our goal is always to provide you with excellent care. Hearing back from our patients is one way we can continue to improve our services. Please take a few minutes to complete the written survey that you may receive in the mail after your visit with us. Thank you!             Your Updated Medication List - Protect others around you: Learn how to safely use, store and throw away your medicines at www.disposemymeds.org.           This list is accurate as of: 10/17/17  9:47 AM.  Always use your most recent med list.                   Brand Name Dispense Instructions for use Diagnosis    prenatal multivitamin plus iron 27-0.8 MG Tabs per tablet      Take 1 tablet by mouth daily

## 2017-10-24 ENCOUNTER — PRENATAL OFFICE VISIT (OUTPATIENT)
Dept: OBGYN | Facility: CLINIC | Age: 37
End: 2017-10-24
Payer: COMMERCIAL

## 2017-10-24 VITALS — BODY MASS INDEX: 32.68 KG/M2 | DIASTOLIC BLOOD PRESSURE: 76 MMHG | SYSTOLIC BLOOD PRESSURE: 132 MMHG | WEIGHT: 199.4 LBS

## 2017-10-24 DIAGNOSIS — O09.519 ELDERLY PRIMIGRAVIDA, ANTEPARTUM: ICD-10-CM

## 2017-10-24 PROCEDURE — 99207 ZZC PRENATAL VISIT: CPT | Performed by: OBSTETRICS & GYNECOLOGY

## 2017-10-24 NOTE — MR AVS SNAPSHOT
"              After Visit Summary   10/24/2017    An Maya    MRN: [id number]           Patient Information     Date Of Birth          1980        Visit Information        Provider Department      10/24/2017 11:20 AM Caryn Lozano MD [facility]        Today's Diagnoses     Elderly primigravida, antepartum           Follow-ups after your visit        Your next 10 appointments already scheduled     Oct 31, 2017  9:30 AM CDT   ESTABLISHED PRENATAL with Caryn Lozano MD   [facility] ([facility])    [address] [place] [id number] [phone]              Who to contact     If you have questions or need follow up information about today's clinic visit or your schedule please contact [facility] directly at [phone].  Normal or non-critical lab and imaging results will be communicated to you by Gigyahart, letter or phone within 4 business days after the clinic has received the results. If you do not hear from us within 7 days, please contact the clinic through MyChart or phone. If you have a critical or abnormal lab result, we will notify you by phone as soon as possible.  Submit refill requests through LibraryThing or call your pharmacy and they will forward the refill request to us. Please allow 3 business days for your refill to be completed.          Additional Information About Your Visit        MyChart Information     LibraryThing lets you send messages to your doctor, view your test results, renew your prescriptions, schedule appointments and more. To sign up, go to [email or web address] . Click on \"Log in\" on the left side of the screen, which will take you to the Welcome page. Then click on \"Sign up Now\" on the right side of the page.     You will be asked to enter the access code listed below, as well as some personal information. Please follow the directions to create your username and " password.     Your access code is: [email or web address]  Expires: 1/15/2018  9:47 AM     Your access code will  in 90 days. If you need help or a new code, please call your [place] clinic or [phone].        Care EveryWhere ID     This is your Care EveryWhere ID. This could be used by other organizations to access your [place] medical records  [id number]        Your Vitals Were     Last Period BMI (Body Mass Index)                2017 (Approximate) 32.68 kg/m2           Blood Pressure from Last 3 Encounters:   10/24/17 132/76   10/17/17 122/70   10/10/17 114/70    Weight from Last 3 Encounters:   10/24/17 90.4 kg (199 lb 6.4 oz)   10/17/17 89.4 kg (197 lb)   10/10/17 87.5 kg (193 lb)              Today, you had the following     No orders found for display       Primary Care Provider Office Phone # Fax #    [facility] [phone]       [address]  [facility] [address]        Equal Access to Services     MYRAN[facility] MARTINEZ : Hadii aad ku hadasho Soomaali, waaxda luqadaha, qaybta kaalmada adeegyada, waxay lisandro coronan darcy zhao . So [facility] [phone].    ATENCIÓN: Si habla español, tiene a hsu disposición servicios gratuitos de asistencia lingüística. Kye al [phone].    We comply with applicable federal civil rights laws and [place] laws. We do not discriminate on the basis of race, color, national origin, age, disability, sex, sexual orientation, or gender identity.            Thank you!     Thank you for choosing [facility]  for your care. Our goal is always to provide you with excellent care. Hearing back from our patients is one way we can continue to improve our services. Please take a few minutes to complete the written survey that you may receive in the mail after your visit with us. Thank you!             Your Updated Medication List - Protect others around you: Learn how  to safely use, store and throw away your medicines at www.disposemymeds.org.          This list is accurate as of: 10/24/17 11:51 AM.  Always use your most recent med list.                   Brand Name Dispense Instructions for use Diagnosis    prenatal multivitamin plus iron 27-0.8 MG Tabs per tablet      Take 1 tablet by mouth daily

## 2017-10-31 ENCOUNTER — PRENATAL OFFICE VISIT (OUTPATIENT)
Dept: OBGYN | Facility: CLINIC | Age: 37
End: 2017-10-31
Payer: COMMERCIAL

## 2017-10-31 VITALS — BODY MASS INDEX: 32.38 KG/M2 | SYSTOLIC BLOOD PRESSURE: 124 MMHG | WEIGHT: 197.6 LBS | DIASTOLIC BLOOD PRESSURE: 70 MMHG

## 2017-10-31 DIAGNOSIS — O09.513 ELDERLY PRIMIGRAVIDA IN THIRD TRIMESTER: Primary | ICD-10-CM

## 2017-10-31 PROCEDURE — 99207 ZZC PRENATAL VISIT: CPT | Performed by: OBSTETRICS & GYNECOLOGY

## 2017-10-31 NOTE — MR AVS SNAPSHOT
"              After Visit Summary   10/31/2017    An Maya    MRN: [id number]           Patient Information     Date Of Birth          1980        Visit Information        Provider Department      10/31/2017 9:30 AM Caryn Lozano MD [facility]        Today's Diagnoses     Elderly primigravida in third trimester    -  1       Follow-ups after your visit        Who to contact     If you have questions or need follow up information about today's clinic visit or your schedule please contact [facility] directly at [phone].  Normal or non-critical lab and imaging results will be communicated to you by ZhenXinhart, letter or phone within 4 business days after the clinic has received the results. If you do not hear from us within 7 days, please contact the clinic through Web and Rankt or phone. If you have a critical or abnormal lab result, we will notify you by phone as soon as possible.  Submit refill requests through Kapture or call your pharmacy and they will forward the refill request to us. Please allow 3 business days for your refill to be completed.          Additional Information About Your Visit        MyChart Information     [employer] lets you send messages to your doctor, view your test results, renew your prescriptions, schedule appointments and more. To sign up, go to [email or web address] . Click on \"Log in\" on the left side of the screen, which will take you to the Welcome page. Then click on \"Sign up Now\" on the right side of the page.     You will be asked to enter the access code listed below, as well as some personal information. Please follow the directions to create your username and password.     Your access code is: [email or web address]  Expires: 1/15/2018  9:47 AM     Your access code will  in 90 days. If you need help or a new code, please call your [place] clinic or [phone].        Care EveryWhere ID     This is your Care EveryWhere " ID. This could be used by other organizations to access your [facility] medical records  [id number]        Your Vitals Were     Last Period BMI (Body Mass Index)                01/30/2017 (Approximate) 32.38 kg/m2           Blood Pressure from Last 3 Encounters:   10/31/17 124/70   10/24/17 132/76   10/17/17 122/70    Weight from Last 3 Encounters:   10/31/17 89.6 kg (197 lb 9.6 oz)   10/24/17 90.4 kg (199 lb 6.4 oz)   10/17/17 89.4 kg (197 lb)              Today, you had the following     No orders found for display       Primary Care Provider Office Phone # Fax #    [facility] [phone]       [place] [address]  [facility] [address]  [facility] [address]        Equal Access to Services     MYRANDA MARTINEZ : Hadii aad ku hadasho Sokinza, waaxda luqadaha, qaybta kaalmada adedanyyada, mell zhao . So [facility] [phone].    ATENCIÓN: Si habla español, tiene a hsu disposición servicios gratuitos de asistencia lingüística. Annaame al [phone].    We comply with applicable federal civil rights laws and [place] laws. We do not discriminate on the basis of race, color, national origin, age, disability, sex, sexual orientation, or gender identity.            Thank you!     Thank you for choosing [facility]  for your care. Our goal is always to provide you with excellent care. Hearing back from our patients is one way we can continue to improve our services. Please take a few minutes to complete the written survey that you may receive in the mail after your visit with us. Thank you!             Your Updated Medication List - Protect others around you: Learn how to safely use, store and throw away your medicines at www.disposemymeds.org.          This list is accurate as of: 10/31/17  9:53 AM.  Always use your most recent med list.                   Brand Name Dispense Instructions for use Diagnosis    prenatal  multivitamin plus iron 27-0.8 MG Tabs per tablet      Take 1 tablet by mouth daily

## 2017-11-01 ENCOUNTER — ANESTHESIA (OUTPATIENT)
Dept: OBGYN | Facility: CLINIC | Age: 37
End: 2017-11-01
Payer: COMMERCIAL

## 2017-11-01 ENCOUNTER — ANESTHESIA EVENT (OUTPATIENT)
Dept: OBGYN | Facility: CLINIC | Age: 37
End: 2017-11-01
Payer: COMMERCIAL

## 2017-11-01 ENCOUNTER — HOSPITAL ENCOUNTER (INPATIENT)
Facility: CLINIC | Age: 37
LOS: 4 days | Discharge: HOME | End: 2017-11-05
Attending: OBSTETRICS & GYNECOLOGY | Admitting: OBSTETRICS & GYNECOLOGY
Payer: COMMERCIAL

## 2017-11-01 LAB
A1 MICROGLOB PLACENTAL VAG QL: POSITIVE
ABO + RH BLD: ABNORMAL
ABO + RH BLD: ABNORMAL
BASOPHILS # BLD AUTO: 0 10E9/L (ref 0–0.2)
BASOPHILS NFR BLD AUTO: 0.2 %
BLD GP AB INVEST PLASRBC-IMP: ABNORMAL
BLD GP AB SCN SERPL QL: ABNORMAL
BLOOD BANK CMNT PATIENT-IMP: ABNORMAL
DIFFERENTIAL METHOD BLD: ABNORMAL
EOSINOPHIL # BLD AUTO: 0.1 10E9/L (ref 0–0.7)
EOSINOPHIL NFR BLD AUTO: 0.9 %
ERYTHROCYTE [DISTWIDTH] IN BLOOD BY AUTOMATED COUNT: 14.2 % (ref 10–15)
HCT VFR BLD AUTO: 31.7 % (ref 35–47)
HGB BLD-MCNC: 10.7 G/DL (ref 11.7–15.7)
IMM GRANULOCYTES # BLD: 0 10E9/L (ref 0–0.4)
IMM GRANULOCYTES NFR BLD: 0.4 %
LYMPHOCYTES # BLD AUTO: 3 10E9/L (ref 0.8–5.3)
LYMPHOCYTES NFR BLD AUTO: 27.3 %
MCH RBC QN AUTO: 30.8 PG (ref 26.5–33)
MCHC RBC AUTO-ENTMCNC: 33.8 G/DL (ref 31.5–36.5)
MCV RBC AUTO: 91 FL (ref 78–100)
MONOCYTES # BLD AUTO: 1.1 10E9/L (ref 0–1.3)
MONOCYTES NFR BLD AUTO: 9.4 %
NEUTROPHILS # BLD AUTO: 6.9 10E9/L (ref 1.6–8.3)
NEUTROPHILS NFR BLD AUTO: 61.8 %
NRBC # BLD AUTO: 0 10*3/UL
NRBC BLD AUTO-RTO: 0 /100
PLATELET # BLD AUTO: 283 10E9/L (ref 150–450)
RBC # BLD AUTO: 3.47 10E12/L (ref 3.8–5.2)
SPECIMEN EXP DATE BLD: ABNORMAL
WBC # BLD AUTO: 11.1 10E9/L (ref 4–11)

## 2017-11-01 PROCEDURE — 86850 RBC ANTIBODY SCREEN: CPT | Performed by: OBSTETRICS & GYNECOLOGY

## 2017-11-01 PROCEDURE — S0191 MISOPROSTOL, ORAL, 200 MCG: HCPCS | Performed by: OBSTETRICS & GYNECOLOGY

## 2017-11-01 PROCEDURE — 86780 TREPONEMA PALLIDUM: CPT | Performed by: OBSTETRICS & GYNECOLOGY

## 2017-11-01 PROCEDURE — 25000132 ZZH RX MED GY IP 250 OP 250 PS 637: Performed by: OBSTETRICS & GYNECOLOGY

## 2017-11-01 PROCEDURE — 86901 BLOOD TYPING SEROLOGIC RH(D): CPT | Performed by: OBSTETRICS & GYNECOLOGY

## 2017-11-01 PROCEDURE — 36415 COLL VENOUS BLD VENIPUNCTURE: CPT | Performed by: OBSTETRICS & GYNECOLOGY

## 2017-11-01 PROCEDURE — 84112 EVAL AMNIOTIC FLUID PROTEIN: CPT | Performed by: OBSTETRICS & GYNECOLOGY

## 2017-11-01 PROCEDURE — 99215 OFFICE O/P EST HI 40 MIN: CPT | Mod: 25

## 2017-11-01 PROCEDURE — 85025 COMPLETE CBC W/AUTO DIFF WBC: CPT | Performed by: OBSTETRICS & GYNECOLOGY

## 2017-11-01 PROCEDURE — 25000125 ZZHC RX 250: Performed by: ANESTHESIOLOGY

## 2017-11-01 PROCEDURE — 86900 BLOOD TYPING SEROLOGIC ABO: CPT | Performed by: OBSTETRICS & GYNECOLOGY

## 2017-11-01 PROCEDURE — 12000029 ZZH R&B OB INTERMEDIATE

## 2017-11-01 PROCEDURE — 25000128 H RX IP 250 OP 636: Performed by: ANESTHESIOLOGY

## 2017-11-01 PROCEDURE — 25000128 H RX IP 250 OP 636: Performed by: OBSTETRICS & GYNECOLOGY

## 2017-11-01 PROCEDURE — 86870 RBC ANTIBODY IDENTIFICATION: CPT | Performed by: OBSTETRICS & GYNECOLOGY

## 2017-11-01 PROCEDURE — 37000011 ZZH ANESTHESIA WARD SERVICE

## 2017-11-01 PROCEDURE — 25000125 ZZHC RX 250: Performed by: OBSTETRICS & GYNECOLOGY

## 2017-11-01 PROCEDURE — 59025 FETAL NON-STRESS TEST: CPT

## 2017-11-01 RX ORDER — OXYTOCIN/0.9 % SODIUM CHLORIDE 30/500 ML
1-24 PLASTIC BAG, INJECTION (ML) INTRAVENOUS CONTINUOUS
Status: DISCONTINUED | OUTPATIENT
Start: 2017-11-01 | End: 2017-11-02

## 2017-11-01 RX ORDER — EPHEDRINE SULFATE 50 MG/ML
5 INJECTION, SOLUTION INTRAMUSCULAR; INTRAVENOUS; SUBCUTANEOUS
Status: DISCONTINUED | OUTPATIENT
Start: 2017-11-01 | End: 2017-11-02

## 2017-11-01 RX ORDER — ONDANSETRON 2 MG/ML
4 INJECTION INTRAMUSCULAR; INTRAVENOUS EVERY 6 HOURS PRN
Status: DISCONTINUED | OUTPATIENT
Start: 2017-11-01 | End: 2017-11-02

## 2017-11-01 RX ORDER — MISOPROSTOL 100 UG/1
50 TABLET ORAL
Status: DISCONTINUED | OUTPATIENT
Start: 2017-11-01 | End: 2017-11-02

## 2017-11-01 RX ORDER — ONDANSETRON 4 MG/1
4 TABLET, ORALLY DISINTEGRATING ORAL EVERY 6 HOURS PRN
Status: DISCONTINUED | OUTPATIENT
Start: 2017-11-01 | End: 2017-11-02

## 2017-11-01 RX ORDER — OXYTOCIN/0.9 % SODIUM CHLORIDE 30/500 ML
100-340 PLASTIC BAG, INJECTION (ML) INTRAVENOUS CONTINUOUS PRN
Status: DISCONTINUED | OUTPATIENT
Start: 2017-11-01 | End: 2017-11-02

## 2017-11-01 RX ORDER — CARBOPROST TROMETHAMINE 250 UG/ML
250 INJECTION, SOLUTION INTRAMUSCULAR
Status: DISCONTINUED | OUTPATIENT
Start: 2017-11-01 | End: 2017-11-02

## 2017-11-01 RX ORDER — NALOXONE HYDROCHLORIDE 0.4 MG/ML
.1-.4 INJECTION, SOLUTION INTRAMUSCULAR; INTRAVENOUS; SUBCUTANEOUS
Status: DISCONTINUED | OUTPATIENT
Start: 2017-11-01 | End: 2017-11-02

## 2017-11-01 RX ORDER — LIDOCAINE HYDROCHLORIDE AND EPINEPHRINE 15; 5 MG/ML; UG/ML
3 INJECTION, SOLUTION EPIDURAL
Status: COMPLETED | OUTPATIENT
Start: 2017-11-01 | End: 2017-11-01

## 2017-11-01 RX ORDER — LIDOCAINE 40 MG/G
CREAM TOPICAL
Status: DISCONTINUED | OUTPATIENT
Start: 2017-11-01 | End: 2017-11-02

## 2017-11-01 RX ORDER — METHYLERGONOVINE MALEATE 0.2 MG/ML
200 INJECTION INTRAVENOUS
Status: DISCONTINUED | OUTPATIENT
Start: 2017-11-01 | End: 2017-11-02

## 2017-11-01 RX ORDER — NALBUPHINE HYDROCHLORIDE 10 MG/ML
2.5-5 INJECTION, SOLUTION INTRAMUSCULAR; INTRAVENOUS; SUBCUTANEOUS EVERY 6 HOURS PRN
Status: DISCONTINUED | OUTPATIENT
Start: 2017-11-01 | End: 2017-11-02

## 2017-11-01 RX ORDER — SODIUM CHLORIDE, SODIUM LACTATE, POTASSIUM CHLORIDE, CALCIUM CHLORIDE 600; 310; 30; 20 MG/100ML; MG/100ML; MG/100ML; MG/100ML
INJECTION, SOLUTION INTRAVENOUS CONTINUOUS
Status: DISCONTINUED | OUTPATIENT
Start: 2017-11-01 | End: 2017-11-02

## 2017-11-01 RX ORDER — ROPIVACAINE HYDROCHLORIDE 2 MG/ML
10 INJECTION, SOLUTION EPIDURAL; INFILTRATION; PERINEURAL ONCE
Status: COMPLETED | OUTPATIENT
Start: 2017-11-01 | End: 2017-11-01

## 2017-11-01 RX ORDER — OXYCODONE AND ACETAMINOPHEN 5; 325 MG/1; MG/1
1 TABLET ORAL
Status: DISCONTINUED | OUTPATIENT
Start: 2017-11-01 | End: 2017-11-02

## 2017-11-01 RX ORDER — ACETAMINOPHEN 325 MG/1
650 TABLET ORAL EVERY 4 HOURS PRN
Status: DISCONTINUED | OUTPATIENT
Start: 2017-11-01 | End: 2017-11-02

## 2017-11-01 RX ORDER — MISOPROSTOL 100 UG/1
50 TABLET ORAL ONCE
Status: COMPLETED | OUTPATIENT
Start: 2017-11-01 | End: 2017-11-01

## 2017-11-01 RX ORDER — IBUPROFEN 400 MG/1
800 TABLET, FILM COATED ORAL
Status: DISCONTINUED | OUTPATIENT
Start: 2017-11-01 | End: 2017-11-02

## 2017-11-01 RX ORDER — OXYTOCIN 10 [USP'U]/ML
10 INJECTION, SOLUTION INTRAMUSCULAR; INTRAVENOUS
Status: DISCONTINUED | OUTPATIENT
Start: 2017-11-01 | End: 2017-11-02

## 2017-11-01 RX ADMIN — OXYTOCIN-SODIUM CHLORIDE 0.9% IV SOLN 30 UNIT/500ML 2 MILLI-UNITS/MIN: 30-0.9/5 SOLUTION at 17:12

## 2017-11-01 RX ADMIN — SODIUM CHLORIDE, POTASSIUM CHLORIDE, SODIUM LACTATE AND CALCIUM CHLORIDE: 600; 310; 30; 20 INJECTION, SOLUTION INTRAVENOUS at 22:24

## 2017-11-01 RX ADMIN — Medication 50 MCG: at 12:49

## 2017-11-01 RX ADMIN — ROPIVACAINE HYDROCHLORIDE 10 ML: 2 INJECTION, SOLUTION EPIDURAL; INFILTRATION at 22:24

## 2017-11-01 RX ADMIN — Medication 50 MCG: at 04:09

## 2017-11-01 RX ADMIN — SODIUM CHLORIDE, POTASSIUM CHLORIDE, SODIUM LACTATE AND CALCIUM CHLORIDE 1000 ML: 600; 310; 30; 20 INJECTION, SOLUTION INTRAVENOUS at 22:26

## 2017-11-01 RX ADMIN — Medication 50 MCG: at 08:05

## 2017-11-01 RX ADMIN — SODIUM CHLORIDE, POTASSIUM CHLORIDE, SODIUM LACTATE AND CALCIUM CHLORIDE: 600; 310; 30; 20 INJECTION, SOLUTION INTRAVENOUS at 17:10

## 2017-11-01 RX ADMIN — Medication 12 ML/HR: at 22:26

## 2017-11-01 RX ADMIN — LIDOCAINE HYDROCHLORIDE AND EPINEPHRINE 3 ML: 15; 5 INJECTION, SOLUTION EPIDURAL at 22:19

## 2017-11-01 NOTE — PLAN OF CARE
Data: Patient presented to [facility] at 0228.   Reason for maternal/fetal assessment per patient is Rule out rupture of membranes  .  Patient is a . Prenatal record reviewed.      . Gestational Age 39w2d. VSS. Fetal movement present. Patient denies cramping, backache, pelvic pressure, UTI symptoms, GI problems, bloody show, vaginal bleeding, edema, headache, visual disturbances, epigastric or URQ pain, abdominal pain. Support persons Jack present.  Action: Verbal consent for EFM. Triage assessment completed. EFM applied for 79 minutes. Uterine assessment complete. Fetal assessment: Presumed adequate fetal oxygenation documented (see flow record).   Response: Dr. Worley informed of status. Plan per provider is admit. Patient verbalized agreement with plan. Patient transferred to room 218 ambulatory, oriented to room and call light. Report given to Shreya MANJARREZ RN.

## 2017-11-01 NOTE — IP AVS SNAPSHOT
[address]    [place] [address]    Phone:  [phone]                                       After Visit Summary   11/1/2017    An Maya    MRN: [id number]           After Visit Summary Signature Page     I have received my discharge instructions, and my questions have been answered. I have discussed any challenges I see with this plan with the nurse or doctor.    ..........................................................................................................................................  Patient/Patient Representative Signature      ..........................................................................................................................................  Patient Representative Print Name and Relationship to Patient    ..................................................               ................................................  Date                                            Time    ..........................................................................................................................................  Reviewed by Signature/Title    ...................................................              ..............................................  Date                                                            Time

## 2017-11-01 NOTE — H&P
No significant change in general health status based on examination of the patient, review of Nursing Admission Database and prenatal record.    EFW: 8lb     Unfavorable cervix  Cl/30/-5  Bedside ultrasound done to confirm cephalic presentation since no presenting part in pelvis on exam  Clear fluid  No ctx  Received one dose cytotec  50mg so far  fht cat 1  Proceed for further cervix ripening  39 2/7w

## 2017-11-01 NOTE — PROVIDER NOTIFICATION
11/01/17 0300   Provider Notification   Provider Name/Title Dr. Worley   Method of Notification Phone   Request Evaluate - Remote   Notification Reason Patient Arrived;Membrane Status;Labor Status;Uterine Activity;Status Update;SVE

## 2017-11-01 NOTE — PLAN OF CARE
Problem: Labor (Cervical Ripen, Induct, Augment) (Adult,Obstetrics,Pediatric)  Goal: Signs and Symptoms of Listed Potential Problems Will be Absent, Minimized or Managed (Labor)  Signs and symptoms of listed potential problems will be absent, minimized or managed by discharge/transition of care (reference Labor (Cervical Ripen, Induct, Augment) (Adult,Obstetrics,Pediatric) CPG).  Outcome: No Change  Patient admitted through triage with SROM. Given oral cytotec as ordered. Denies feeling contractions. FHR category 1. Report given to Cecy Rubalcava RN.

## 2017-11-01 NOTE — IP AVS SNAPSHOT
MRN:[id number]                      After Visit Summary   11/1/2017    An Maya    MRN: [id number]           Thank you!     Thank you for choosing [facility] for your care. Our goal is always to provide you with excellent care. Hearing back from our patients is one way we can continue to improve our services. Please take a few minutes to complete the written survey that you may receive in the mail after you visit with us. Thank you!        Patient Information     Date Of Birth          1980        About your hospital stay     You were admitted on:  November 1, 2017 You last received care in the:  [address]    You were discharged on:  November 5, 2017        Reason for your hospital stay       c section                  Who to Call     For medical emergencies, please call 911.  For non-urgent questions about your medical care, please call your primary care provider or clinic, [phone]  For questions related to your surgery, please call your surgery clinic        Attending Provider     Provider Specialty    Nancy Worley MD OB/Gyn    Caryn Lozano MD OB/Gyn       Primary Care Provider Office Phone # Fax #    [facility] [phone]      After Care Instructions     Activity       Your activity upon discharge: activity as tolerated and no driving for today            Diet       Follow this diet upon discharge: Regular                  Follow-up Appointments     Follow-up and recommended labs and tests        Follow up with me,  Caryn Lozano, within 6 wks                  Your next 10 appointments already scheduled     Nov 07, 2017 10:50 AM CST   ESTABLISHED PRENATAL with Caryn Lozano MD   [facility] [place] ([facility] [place])    [address] [place] [id number] [phone]              Further instructions from your care team        Postop  Birth Instructions    Activity       Do not lift more than 10 pounds for 6 weeks after surgery.  Ask family and friends for help when you need it.    No driving until you have stopped taking your pain medications (usually two weeks after surgery).    No heavy exercise or activity for 6 weeks.  Don't do anything that will put a strain on your surgery site.    Don't strain when using the toilet.  Your care team may prescribe a stool softener if you have problems with your bowel movements.     To care for your incision:       Keep the incision clean and dry.    Do not soak your incision in water. No swimming or hot tubs until it has fully healed. You may soak in the bathtub if the water level is below your incision.    Do not use peroxide, gel, cream, lotion, or ointment on your incision.    Adjust your clothes to avoid pressure on your surgery site (check the elastic in your underwear for example).     You may see a small amount of clear or pink drainage and this is normal.  Check with your health care provider:       If the drainage increases or has an odor.    If the incision reddens, you have swelling, or develop a rash.    If you have increased pain and the medicine we prescribed doesn't help.    If you have a fever above 100.4 F (38 C) with or without chills when placing thermometer under your tongue.   The area around your incision (surgery wound), will feel numb.  This is normal. The numbness should go away in less than a year.     Keep your hands clean:  Always wash your hands before touching your incision (surgery wound). This helps reduce your risk of infection. If your hands aren't dirty, you may use an alcohol hand-rub to clean your hands. Keep your nails clean and short.    Call your healthcare provider if you have any of these symptoms:       You soak a sanitary pad with blood within 1 hour, or you see blood clots larger than a golf ball.    Bleeding that lasts more than 6  "weeks.    Vaginal discharge that smells bad.    Severe pain, cramping or tenderness in your lower belly area.    A need to urinate more frequently (use the toilet more often), more urgently (use the toilet very quickly), or it burns when you urinate.    Nausea and vomiting.    Redness, swelling or pain around a vein in your leg.    Problems breastfeeding or a red or painful area on your breast.    Chest pain and cough or are gasping for air.    Problems with coping with sadness, anxiety or depression. If you have concerns about hurting yourself or the baby, call your provider immediately.      You have questions or concerns after you return home.                  Pending Results     No orders found from 10/30/2017 to 2017.            Statement of Approval     Ordered          17 0930  I have reviewed and agree with all the recommendations and orders detailed in this document.  EFFECTIVE NOW     Approved and electronically signed by:  Caryn Lozano MD             Admission Information     Date & Time Provider Department Dept. Phone    2017 Caryn Lozano MD [address] [phone]      Your Vitals Were     Blood Pressure Pulse Temperature Respirations Last Period Pulse Oximetry    131/89 79 98  F (36.7  C) (Oral) 16 2017 (Approximate) 98%      MyChart Information     [employer] lets you send messages to your doctor, view your test results, renew your prescriptions, schedule appointments and more. To sign up, go to [email or web address] . Click on \"Log in\" on the left side of the screen, which will take you to the Welcome page. Then click on \"Sign up Now\" on the right side of the page.     You will be asked to enter the access code listed below, as well as some personal information. Please follow the directions to create your username and password.     Your access code is: [email or web address]  Expires: 1/15/2018  8:47 AM     Your access code will  in 90 days. If " you need help or a new code, please call your [facility] clinic or [phone].        Care EveryWhere ID     This is your Care EveryWhere ID. This could be used by other organizations to access your [facility] medical records  [id number]        Equal Access to Services     SERENESHANTELL MICHELLE : Hadii aad ku hadvibenjamin Soteeali, waaxda luqadaha, qaybta kaalmada adeegnanetteda, mell mcmahon cjdiane taveras nurajohn arambula. So [facility] [phone].    ATENCIÓN: Si habla español, tiene a hsu disposición servicios gratuitos de asistencia lingüística. Llame al [phone].    We comply with applicable federal civil rights laws and [place] laws. We do not discriminate on the basis of race, color, national origin, age, disability, sex, sexual orientation, or gender identity.               Review of your medicines      START taking        Dose / Directions    oxyCODONE IR 5 MG tablet   Commonly known as:  ROXICODONE        Dose:  5 mg   Take 1 tablet (5 mg) by mouth every 6 hours as needed for moderate to severe pain   Quantity:  30 tablet   Refills:  0         CONTINUE these medicines which have NOT CHANGED        Dose / Directions    prenatal multivitamin plus iron 27-0.8 MG Tabs per tablet        Dose:  1 tablet   Take 1 tablet by mouth daily   Refills:  0            Where to get your medicines      Some of these will need a paper prescription and others can be bought over the counter. Ask your nurse if you have questions.     Bring a paper prescription for each of these medications     oxyCODONE IR 5 MG tablet                Protect others around you: Learn how to safely use, store and throw away your medicines at www.disposemymeds.org.             Medication List: This is a list of all your medications and when to take them. Check marks below indicate your daily home schedule. Keep this list as a reference.      Medications           Morning Afternoon Evening Bedtime As Needed    oxyCODONE IR 5 MG tablet   Commonly known as:  ROXICODONE    Take 1 tablet (5 mg) by mouth every 6 hours as needed for moderate to severe pain   Last time this was given:  5 mg on 11/5/2017  6:45 AM                                prenatal multivitamin plus iron 27-0.8 MG Tabs per tablet   Take 1 tablet by mouth daily

## 2017-11-02 ENCOUNTER — ANESTHESIA EVENT (OUTPATIENT)
Dept: OBGYN | Facility: CLINIC | Age: 37
End: 2017-11-02
Payer: COMMERCIAL

## 2017-11-02 ENCOUNTER — ANESTHESIA (OUTPATIENT)
Dept: OBGYN | Facility: CLINIC | Age: 37
End: 2017-11-02
Payer: COMMERCIAL

## 2017-11-02 LAB
BLOOD BANK CMNT PATIENT-IMP: NORMAL
T PALLIDUM IGG+IGM SER QL: NEGATIVE

## 2017-11-02 PROCEDURE — 25000125 ZZHC RX 250: Performed by: NURSE ANESTHETIST, CERTIFIED REGISTERED

## 2017-11-02 PROCEDURE — 12000037 ZZH R&B POSTPARTUM INTERMEDIATE

## 2017-11-02 PROCEDURE — 25000128 H RX IP 250 OP 636

## 2017-11-02 PROCEDURE — 37000009 ZZH ANESTHESIA TECHNICAL FEE, EACH ADDTL 15 MIN: Performed by: OBSTETRICS & GYNECOLOGY

## 2017-11-02 PROCEDURE — 71000012 ZZH RECOVERY PHASE 1 LEVEL 1 FIRST HR: Performed by: OBSTETRICS & GYNECOLOGY

## 2017-11-02 PROCEDURE — 37000008 ZZH ANESTHESIA TECHNICAL FEE, 1ST 30 MIN: Performed by: OBSTETRICS & GYNECOLOGY

## 2017-11-02 PROCEDURE — 36000058 ZZH SURGERY LEVEL 3 EA 15 ADDTL MIN: Performed by: OBSTETRICS & GYNECOLOGY

## 2017-11-02 PROCEDURE — 25000128 H RX IP 250 OP 636: Performed by: OBSTETRICS & GYNECOLOGY

## 2017-11-02 PROCEDURE — 25000125 ZZHC RX 250: Performed by: OBSTETRICS & GYNECOLOGY

## 2017-11-02 PROCEDURE — 59510 CESAREAN DELIVERY: CPT | Performed by: OBSTETRICS & GYNECOLOGY

## 2017-11-02 PROCEDURE — 27210794 ZZH OR GENERAL SUPPLY STERILE: Performed by: OBSTETRICS & GYNECOLOGY

## 2017-11-02 PROCEDURE — 25000132 ZZH RX MED GY IP 250 OP 250 PS 637: Performed by: OBSTETRICS & GYNECOLOGY

## 2017-11-02 PROCEDURE — 36000056 ZZH SURGERY LEVEL 3 1ST 30 MIN: Performed by: OBSTETRICS & GYNECOLOGY

## 2017-11-02 PROCEDURE — 25000128 H RX IP 250 OP 636: Performed by: NURSE ANESTHETIST, CERTIFIED REGISTERED

## 2017-11-02 RX ORDER — LIDOCAINE HCL/EPINEPHRINE/PF 2%-1:200K
VIAL (ML) INJECTION
Status: DISCONTINUED
Start: 2017-11-02 | End: 2017-11-02 | Stop reason: HOSPADM

## 2017-11-02 RX ORDER — OXYTOCIN 10 [USP'U]/ML
10 INJECTION, SOLUTION INTRAMUSCULAR; INTRAVENOUS
Status: DISCONTINUED | OUTPATIENT
Start: 2017-11-02 | End: 2017-11-05 | Stop reason: HOSPADM

## 2017-11-02 RX ORDER — HYDROMORPHONE HYDROCHLORIDE 1 MG/ML
.3-.5 INJECTION, SOLUTION INTRAMUSCULAR; INTRAVENOUS; SUBCUTANEOUS EVERY 30 MIN PRN
Status: DISCONTINUED | OUTPATIENT
Start: 2017-11-02 | End: 2017-11-05 | Stop reason: HOSPADM

## 2017-11-02 RX ORDER — OXYCODONE HYDROCHLORIDE 5 MG/1
5-10 TABLET ORAL
Status: DISCONTINUED | OUTPATIENT
Start: 2017-11-02 | End: 2017-11-05 | Stop reason: HOSPADM

## 2017-11-02 RX ORDER — NALOXONE HYDROCHLORIDE 0.4 MG/ML
.1-.4 INJECTION, SOLUTION INTRAMUSCULAR; INTRAVENOUS; SUBCUTANEOUS
Status: DISCONTINUED | OUTPATIENT
Start: 2017-11-02 | End: 2017-11-05 | Stop reason: HOSPADM

## 2017-11-02 RX ORDER — CEFAZOLIN SODIUM 2 G/100ML
2 INJECTION, SOLUTION INTRAVENOUS
Status: COMPLETED | OUTPATIENT
Start: 2017-11-02 | End: 2017-11-02

## 2017-11-02 RX ORDER — HYDROCORTISONE 2.5 %
CREAM (GRAM) TOPICAL 3 TIMES DAILY PRN
Status: DISCONTINUED | OUTPATIENT
Start: 2017-11-02 | End: 2017-11-05 | Stop reason: HOSPADM

## 2017-11-02 RX ORDER — OXYTOCIN/0.9 % SODIUM CHLORIDE 30/500 ML
PLASTIC BAG, INJECTION (ML) INTRAVENOUS
Status: DISCONTINUED
Start: 2017-11-02 | End: 2017-11-02 | Stop reason: WASHOUT

## 2017-11-02 RX ORDER — KETOROLAC TROMETHAMINE 30 MG/ML
INJECTION, SOLUTION INTRAMUSCULAR; INTRAVENOUS
Status: COMPLETED
Start: 2017-11-02 | End: 2017-11-02

## 2017-11-02 RX ORDER — ONDANSETRON 2 MG/ML
4 INJECTION INTRAMUSCULAR; INTRAVENOUS EVERY 6 HOURS PRN
Status: DISCONTINUED | OUTPATIENT
Start: 2017-11-02 | End: 2017-11-05 | Stop reason: HOSPADM

## 2017-11-02 RX ORDER — ONDANSETRON 2 MG/ML
INJECTION INTRAMUSCULAR; INTRAVENOUS PRN
Status: DISCONTINUED | OUTPATIENT
Start: 2017-11-02 | End: 2017-11-02

## 2017-11-02 RX ORDER — CEFAZOLIN SODIUM 1 G/3ML
1 INJECTION, POWDER, FOR SOLUTION INTRAMUSCULAR; INTRAVENOUS SEE ADMIN INSTRUCTIONS
Status: DISCONTINUED | OUTPATIENT
Start: 2017-11-02 | End: 2017-11-02

## 2017-11-02 RX ORDER — OXYTOCIN/0.9 % SODIUM CHLORIDE 30/500 ML
340 PLASTIC BAG, INJECTION (ML) INTRAVENOUS CONTINUOUS PRN
Status: DISCONTINUED | OUTPATIENT
Start: 2017-11-02 | End: 2017-11-05 | Stop reason: HOSPADM

## 2017-11-02 RX ORDER — ONDANSETRON 2 MG/ML
INJECTION INTRAMUSCULAR; INTRAVENOUS
Status: DISCONTINUED
Start: 2017-11-02 | End: 2017-11-02 | Stop reason: HOSPADM

## 2017-11-02 RX ORDER — OXYTOCIN/0.9 % SODIUM CHLORIDE 30/500 ML
100 PLASTIC BAG, INJECTION (ML) INTRAVENOUS CONTINUOUS
Status: DISCONTINUED | OUTPATIENT
Start: 2017-11-02 | End: 2017-11-05 | Stop reason: HOSPADM

## 2017-11-02 RX ORDER — LANOLIN 100 %
OINTMENT (GRAM) TOPICAL
Status: DISCONTINUED | OUTPATIENT
Start: 2017-11-02 | End: 2017-11-05 | Stop reason: HOSPADM

## 2017-11-02 RX ORDER — BISACODYL 10 MG
10 SUPPOSITORY, RECTAL RECTAL DAILY PRN
Status: DISCONTINUED | OUTPATIENT
Start: 2017-11-04 | End: 2017-11-05 | Stop reason: HOSPADM

## 2017-11-02 RX ORDER — IBUPROFEN 400 MG/1
400-800 TABLET, FILM COATED ORAL EVERY 6 HOURS PRN
Status: DISCONTINUED | OUTPATIENT
Start: 2017-11-02 | End: 2017-11-05 | Stop reason: HOSPADM

## 2017-11-02 RX ORDER — ACETAMINOPHEN 325 MG/1
650 TABLET ORAL EVERY 4 HOURS PRN
Status: DISCONTINUED | OUTPATIENT
Start: 2017-11-05 | End: 2017-11-05 | Stop reason: HOSPADM

## 2017-11-02 RX ORDER — DIPHENHYDRAMINE HCL 25 MG
25 CAPSULE ORAL EVERY 6 HOURS PRN
Status: DISCONTINUED | OUTPATIENT
Start: 2017-11-02 | End: 2017-11-05 | Stop reason: HOSPADM

## 2017-11-02 RX ORDER — KETOROLAC TROMETHAMINE 30 MG/ML
30 INJECTION, SOLUTION INTRAMUSCULAR; INTRAVENOUS EVERY 6 HOURS
Status: COMPLETED | OUTPATIENT
Start: 2017-11-02 | End: 2017-11-02

## 2017-11-02 RX ORDER — DEXTROSE, SODIUM CHLORIDE, SODIUM LACTATE, POTASSIUM CHLORIDE, AND CALCIUM CHLORIDE 5; .6; .31; .03; .02 G/100ML; G/100ML; G/100ML; G/100ML; G/100ML
INJECTION, SOLUTION INTRAVENOUS CONTINUOUS
Status: DISCONTINUED | OUTPATIENT
Start: 2017-11-02 | End: 2017-11-05 | Stop reason: HOSPADM

## 2017-11-02 RX ORDER — LIDOCAINE 40 MG/G
CREAM TOPICAL
Status: DISCONTINUED | OUTPATIENT
Start: 2017-11-02 | End: 2017-11-05 | Stop reason: HOSPADM

## 2017-11-02 RX ORDER — CITRIC ACID/SODIUM CITRATE 334-500MG
30 SOLUTION, ORAL ORAL
Status: COMPLETED | OUTPATIENT
Start: 2017-11-02 | End: 2017-11-02

## 2017-11-02 RX ORDER — MISOPROSTOL 200 UG/1
400 TABLET ORAL
Status: DISCONTINUED | OUTPATIENT
Start: 2017-11-02 | End: 2017-11-05 | Stop reason: HOSPADM

## 2017-11-02 RX ORDER — SODIUM CHLORIDE, SODIUM LACTATE, POTASSIUM CHLORIDE, CALCIUM CHLORIDE 600; 310; 30; 20 MG/100ML; MG/100ML; MG/100ML; MG/100ML
INJECTION, SOLUTION INTRAVENOUS CONTINUOUS
Status: DISCONTINUED | OUTPATIENT
Start: 2017-11-02 | End: 2017-11-02

## 2017-11-02 RX ORDER — DIPHENHYDRAMINE HYDROCHLORIDE 50 MG/ML
25 INJECTION INTRAMUSCULAR; INTRAVENOUS EVERY 6 HOURS PRN
Status: DISCONTINUED | OUTPATIENT
Start: 2017-11-02 | End: 2017-11-05 | Stop reason: HOSPADM

## 2017-11-02 RX ORDER — ACETAMINOPHEN 325 MG/1
975 TABLET ORAL EVERY 8 HOURS
Status: COMPLETED | OUTPATIENT
Start: 2017-11-02 | End: 2017-11-05

## 2017-11-02 RX ORDER — SIMETHICONE 80 MG
80 TABLET,CHEWABLE ORAL 4 TIMES DAILY PRN
Status: DISCONTINUED | OUTPATIENT
Start: 2017-11-02 | End: 2017-11-05 | Stop reason: HOSPADM

## 2017-11-02 RX ORDER — ONDANSETRON 2 MG/ML
INJECTION INTRAMUSCULAR; INTRAVENOUS
Status: COMPLETED
Start: 2017-11-02 | End: 2017-11-02

## 2017-11-02 RX ORDER — LIDOCAINE HCL/EPINEPHRINE/PF 2%-1:200K
VIAL (ML) INJECTION PRN
Status: DISCONTINUED | OUTPATIENT
Start: 2017-11-02 | End: 2017-11-02

## 2017-11-02 RX ORDER — AMOXICILLIN 250 MG
1-2 CAPSULE ORAL 2 TIMES DAILY
Status: DISCONTINUED | OUTPATIENT
Start: 2017-11-02 | End: 2017-11-05 | Stop reason: HOSPADM

## 2017-11-02 RX ADMIN — KETOROLAC TROMETHAMINE 30 MG: 30 INJECTION, SOLUTION INTRAMUSCULAR at 14:11

## 2017-11-02 RX ADMIN — SODIUM CITRATE AND CITRIC ACID MONOHYDRATE 30 ML: 500; 334 SOLUTION ORAL at 00:21

## 2017-11-02 RX ADMIN — LIDOCAINE HYDROCHLORIDE,EPINEPHRINE BITARTRATE 16 ML: 20; .005 INJECTION, SOLUTION EPIDURAL; INFILTRATION; INTRACAUDAL; PERINEURAL at 00:29

## 2017-11-02 RX ADMIN — ONDANSETRON 4 MG: 2 SOLUTION INTRAMUSCULAR; INTRAVENOUS at 01:25

## 2017-11-02 RX ADMIN — CEFAZOLIN SODIUM 2 G: 2 INJECTION, SOLUTION INTRAVENOUS at 00:31

## 2017-11-02 RX ADMIN — PHENYLEPHRINE HYDROCHLORIDE 50 MCG: 10 INJECTION INTRAVENOUS at 00:50

## 2017-11-02 RX ADMIN — SODIUM CHLORIDE, SODIUM LACTATE, POTASSIUM CHLORIDE, CALCIUM CHLORIDE AND DEXTROSE MONOHYDRATE: 5; 600; 310; 30; 20 INJECTION, SOLUTION INTRAVENOUS at 10:14

## 2017-11-02 RX ADMIN — SODIUM CHLORIDE, POTASSIUM CHLORIDE, SODIUM LACTATE AND CALCIUM CHLORIDE 1000 ML: 600; 310; 30; 20 INJECTION, SOLUTION INTRAVENOUS at 00:15

## 2017-11-02 RX ADMIN — HYDROMORPHONE HYDROCHLORIDE 0.3 MG: 1 INJECTION, SOLUTION INTRAMUSCULAR; INTRAVENOUS; SUBCUTANEOUS at 10:45

## 2017-11-02 RX ADMIN — PHENYLEPHRINE HYDROCHLORIDE 50 MCG: 10 INJECTION INTRAVENOUS at 01:02

## 2017-11-02 RX ADMIN — KETOROLAC TROMETHAMINE 30 MG: 30 INJECTION, SOLUTION INTRAMUSCULAR at 20:20

## 2017-11-02 RX ADMIN — SENNOSIDES AND DOCUSATE SODIUM 1 TABLET: 8.6; 5 TABLET ORAL at 12:14

## 2017-11-02 RX ADMIN — ACETAMINOPHEN 975 MG: 325 TABLET, FILM COATED ORAL at 13:53

## 2017-11-02 RX ADMIN — ACETAMINOPHEN 975 MG: 325 TABLET, FILM COATED ORAL at 05:27

## 2017-11-02 RX ADMIN — OXYCODONE HYDROCHLORIDE 5 MG: 5 TABLET ORAL at 16:22

## 2017-11-02 RX ADMIN — HYDROMORPHONE HYDROCHLORIDE 0.3 MG: 1 INJECTION, SOLUTION INTRAMUSCULAR; INTRAVENOUS; SUBCUTANEOUS at 06:19

## 2017-11-02 RX ADMIN — PHENYLEPHRINE HYDROCHLORIDE 50 MCG: 10 INJECTION INTRAVENOUS at 00:56

## 2017-11-02 RX ADMIN — KETOROLAC TROMETHAMINE 30 MG: 30 INJECTION, SOLUTION INTRAMUSCULAR at 02:19

## 2017-11-02 RX ADMIN — ONDANSETRON 4 MG: 2 INJECTION INTRAMUSCULAR; INTRAVENOUS at 01:25

## 2017-11-02 RX ADMIN — SENNOSIDES AND DOCUSATE SODIUM 1 TABLET: 8.6; 5 TABLET ORAL at 20:19

## 2017-11-02 RX ADMIN — OXYCODONE HYDROCHLORIDE 5 MG: 5 TABLET ORAL at 12:13

## 2017-11-02 RX ADMIN — ACETAMINOPHEN 975 MG: 325 TABLET, FILM COATED ORAL at 22:38

## 2017-11-02 RX ADMIN — OXYTOCIN-SODIUM CHLORIDE 0.9% IV SOLN 30 UNIT/500ML 100 ML/HR: 30-0.9/5 SOLUTION at 05:23

## 2017-11-02 RX ADMIN — OXYCODONE HYDROCHLORIDE 5 MG: 5 TABLET ORAL at 22:38

## 2017-11-02 RX ADMIN — ONDANSETRON 4 MG: 2 INJECTION INTRAMUSCULAR; INTRAVENOUS at 00:31

## 2017-11-02 RX ADMIN — KETOROLAC TROMETHAMINE 30 MG: 30 INJECTION, SOLUTION INTRAMUSCULAR at 08:14

## 2017-11-02 RX ADMIN — OXYCODONE HYDROCHLORIDE 5 MG: 5 TABLET ORAL at 19:18

## 2017-11-02 NOTE — BRIEF OP NOTE
[facility] Brief Operative Note    Pre-operative diagnosis: Fetal intolerance of labor   Post-operative diagnosis same   Procedure: Procedure(s):   - Wound Class: II-Clean Contaminated   Surgeon(s): Surgeon(s) and Role:     * Caryn Lozano MD - Primary   Estimated blood loss: 600 mL    Specimens: Cord gases sent   Findings: Male infant   Tight nuchal cord x 2  Cord and cord blood collected for permanent storage     ebl 600 cc  Epidural anesthesia

## 2017-11-02 NOTE — ANESTHESIA PREPROCEDURE EVALUATION
Anesthesia Evaluation     .             ROS/MED HX    ENT/Pulmonary:     (+)asthma , . .    Neurologic:       Cardiovascular:         METS/Exercise Tolerance:     Hematologic:         Musculoskeletal:         GI/Hepatic:         Renal/Genitourinary:         Endo:         Psychiatric:         Infectious Disease:         Malignancy:         Other:                                    Anesthesia Plan      History & Physical Review      ASA Status:  .  OB Epidural Asa: 2            Postoperative Care      Consents                          .

## 2017-11-02 NOTE — ANESTHESIA POSTPROCEDURE EVALUATION
Patient: An Maya    * No procedures listed *    Diagnosis:* No pre-op diagnosis entered *  Diagnosis Additional Information: No value filed.    Anesthesia Type:  No value filed.    Note:  Anesthesia Post Evaluation    Patient location during evaluation: OB PACU  Patient participation: Able to fully participate in evaluation     Anesthetic complications: None    Comments: Used for  and removed by CRNA after. No apparent complications.        Last vitals:  Vitals:    17 2250 17 2300 17 2320   BP: 99/57 111/73 122/85   Pulse:      Resp:      Temp:      SpO2: 97% 99% 98%         Electronically Signed By: GAVIN CACERES MD  2017  1:24 AM

## 2017-11-02 NOTE — ANESTHESIA PREPROCEDURE EVALUATION
Anesthesia Evaluation     .             ROS/MED HX    ENT/Pulmonary:     (+)asthma , . .   (-) sleep apnea   Neurologic:       Cardiovascular:         METS/Exercise Tolerance:     Hematologic:         Musculoskeletal:         GI/Hepatic:        (-) GERD   Renal/Genitourinary:         Endo:         Psychiatric:         Infectious Disease:         Malignancy:         Other:                     Physical Exam  Normal systems: cardiovascular, pulmonary and dental    Airway   Mallampati: II  TM distance: >3 FB  Neck ROM: full    Dental     Cardiovascular       Pulmonary                     Anesthesia Plan      History & Physical Review  History and physical reviewed and following examination; no interval change.    ASA Status:  2 .    NPO Status:  > 8 hours    Plan for Epidural   PONV prophylaxis:  Ondansetron (or other 5HT-3)       Postoperative Care  Postoperative pain management:  IV analgesics.      Consents  Anesthetic plan, risks, benefits and alternatives discussed with:  Patient..                          .

## 2017-11-02 NOTE — LACTATION NOTE
Initial Lactation visit. Hand out given. Recommend unlimited, frequent breast feedings: At least 8 - 12 times every 24 hours. Avoid pacifiers and supplementation with formula unless medically indicated. Explained benefits of holding baby skin on skin to help promote better breastfeeding outcomes.   Infant sleepy at time of visit.  Pt has history of breast augmentation.  Concerned about her milk supply.  Pump in room.  Discussed pumping if baby is sleepy.  Reviewed normal  feeding patterns and cluster feeding second night.  Will revisit as needed.    Brenda Campuzano RN, IBCLC

## 2017-11-02 NOTE — PLAN OF CARE
Dr. Burnett in room for epidural placement at 2205.  Medications ropivicaine handed off to [facility] at this time.  Time out performed.  Pt sat up at edge of bed.  EFM off during procedure.  Pt. Tolerated procedure well.  Into left tilt position at 2220, EFM reapplied.  BP set for every 2 min and continuous pulse ox in place.

## 2017-11-02 NOTE — PROGRESS NOTES
S: Pt doing well. Infant is being . Pain is well controlled.    O: /73  Pulse 85  Temp 98.4  F (36.9  C) (Oral)  Resp 16  LMP 01/30/2017 (Approximate)  SpO2 98%  Breastfeeding? Unknown        ABD:  Uterine fundus is firm, non-tender and at the level of the umbilicus       INC: clean/dry/intact                Hemoglobin   Date Value Ref Range Status   11/01/2017 10.7 (L) 11.7 - 15.7 g/dL Final            Lab Results   Component Value Date    RH Neg 11/01/2017       A: Post-op Day #0 s/p C/Section    P: Continue Post Op Cares      Home Sunday

## 2017-11-02 NOTE — ANESTHESIA CARE TRANSFER NOTE
Patient: An Maya    Procedure(s):   - Wound Class: II-Clean Contaminated    Diagnosis: pregnancy  Diagnosis Additional Information: No value filed.    Anesthesia Type:   Epidural     Note:  Airway :Room Air  Patient transferred to:PACU        Vitals: (Last set prior to Anesthesia Care Transfer)    CRNA VITALS  11/2/2017 0039 - 11/2/2017 0114      11/2/2017             Resp Rate (set): 10                Electronically Signed By: YUNIEL Navarrete CRNA  November 2, 2017  1:14 AM

## 2017-11-02 NOTE — OP NOTE
PREOPERATIVE DIAGNOSES:     1.  Fetal intolerance of labor at 39 and 3/7 weeks' gestation.   2.  Ruptured membranes for 20 hours.   3.  Persistent decelerations remote from delivery.      POSTOPERATIVE DIAGNOSES:    1.  Fetal intolerance of labor at 39 and 3/7 weeks' gestation.   2.  Ruptured membranes for 20 hours.   3.  Persistent decelerations remote from delivery.      PROCEDURE:       SURGEON:  Caryn Lozano MD      ANESTHESIA:  Epidural.      ESTIMATED BLOOD LOSS:  600 cc.      FINDINGS:     1.  Male infant.   2.  Tight nuchal cord x2.   3.  Clear amniotic fluid.      DESCRIPTION OF PROCEDURE:  The patient An Maya was taken to the operating room on 11/02/2017 where she was placed in the supine position with a lateral tilt on the operating room table, and anesthesia was administered.  She was prepped, and we waited 3 minutes for the prep to dry.  The patient was then surgically draped.  A time-out procedure was observed.        We made a Pfannenstiel incision in the abdomen and entered the abdomen in layers.  We made a vertical incision in the peritoneum.  We made a low transverse incision in the uterus and took down the bladder flap with sharp dissection.  The baby was delivered without difficulty after 1 nuchal cord was reduced.  We did delay cord clamping, and we obtained cord blood since the patient was Rh negative.  We did a cord blood collection in a sterile fashion for the purpose of private permanent storage at the family's request.  We also sent a length of cord in that kit.  Cord gases were sent.  The baby was passed to the Nurses for evaluation.        The placenta was delivered manually.  The uterus was exteriorized.  Tubes and ovaries looked normal.  We removed clots and debris.  We closed the uterine incision with 0 Vicryl in a 2-layer running fashion, and the uterus was then returned to the abdominal cavity.        The fascia was closed with 0 Vicryl, and the skin was closed with 4-0  Vicryl in a subcuticular fashion.  Steri-Strips and sterile dressings were applied, and the patient went to the Recovery Room in stable condition.         DANYA DALEY MD             D: 2017 01:20   T: 2017 07:35   MT: MARCIA#[id number]      Name:     KING FAN   MRN:      [id number]        Account:        [id number]   :      1980           Procedure Date: 2017      Document: [id number]

## 2017-11-02 NOTE — PROGRESS NOTES
Patient received several doses of oral cytotec starting at 4am after ROM  By 5pm had progressed to 1.5/-2  Pitocin induction begun  Patient now very uncomfortable with contractions  2+/70/-2/posterios  Wants epidural  FHT cat 1

## 2017-11-02 NOTE — PLAN OF CARE
Data: An Maya transferred to postpartum room 412 via wheelchair at 0315. Baby transferred via parent's arms.  Action: Receiving unit notified of transfer: Yes. Patient and family notified of room change. Report given to SADAF Aly RN at 0320. Belongings sent to receiving unit. Accompanied by Registered Nurse. Oriented patient to surroundings. Call light within reach. ID bands double-checked with receiving RN.  Response: Patient tolerated transfer and is stable.

## 2017-11-02 NOTE — ANESTHESIA PROCEDURE NOTES
Peripheral nerve/Neuraxial procedure note : epidural catheter  Pre-Procedure  Performed by GAVIN CACERES  Location: OB      Pre-Anesthestic Checklist: patient identified, IV checked, risks and benefits discussed, informed consent, monitors and equipment checked, pre-op evaluation and at physician/surgeon's request    Timeout  Correct Patient: Yes   Correct Procedure: Yes   Correct Site: Yes   Correct Laterality: N/A   Correct Position: Yes   Site Marked: N/A   .   Procedure Documentation    .    Procedure:    Epidural catheter.  Insertion Site:L4-5  (midline approach) Injection technique: LORT saline   Local skin infiltrated with mL of 1% lidocaine.  STEVE at 5 cm     Patient Prep;mask, sterile gloves, povidone-iodine 7.5% surgical scrub, patient draped.  .  Needle: Touhy needle Needle Gauge: 17.    Needle Length (Inches) 3.5  # of attempts: 1 and # of redirects:  .   . .  Catheter threaded easily  .  10 cm at skin.   .    Assessment/Narrative  Paresthesias: No.  .  .  Aspiration negative for heme or CSF  . Test dose of 3 mL lidocaine 1.5% w/ 1:200,000 epinephrine at. Test dose negative for signs of intravascular, subdural or intrathecal injection. Comments:  No complications.  Catheter secured with sterile dressing and tape.  Questions answered.

## 2017-11-02 NOTE — PLAN OF CARE
Recurrent LD noted after epidural placement.  Dr. Lozano notified.  Reviewed strip.  Pitocin stopped, fluid bolus administered and oxygen applied.  Recurrent LD persist after interventions.  Dr. Lozano at bedside.  Decision to proceed with  at 2358.  Operative consent signed with Dr. Lozano, witnessed by this RN.  Questions answered, Preop checklist started.

## 2017-11-02 NOTE — ADDENDUM NOTE
Addendum  created 11/02/17 0254 by Sang Perera APRN CRNA    Anesthesia Intra Smart[employer] edited

## 2017-11-02 NOTE — ANESTHESIA POSTPROCEDURE EVALUATION
Patient: An Maya    Procedure(s):   - Wound Class: II-Clean Contaminated    Diagnosis:pregnancy  Diagnosis Additional Information: No value filed.    Anesthesia Type:  Epidural    Note:  Anesthesia Post Evaluation    Patient location during evaluation: OB PACU  Patient participation: Able to participate in evaluation but full recovery from regional anesthesia has not yet ocurrred but is anticipated to occur within 48 hours  Level of consciousness: awake  Pain management: adequate  Airway patency: patent  Cardiovascular status: acceptable  Respiratory status: acceptable  Hydration status: acceptable  PONV: controlled     Anesthetic complications: None          Last vitals:  Vitals:    11/01/17 2250 11/01/17 2300 11/01/17 2320   BP: 99/57 111/73 122/85   Pulse:      Resp:      Temp:      SpO2: 97% 99% 98%         Electronically Signed By: GAVIN CACERES MD  November 2, 2017  1:23 AM

## 2017-11-02 NOTE — PROGRESS NOTES
Patient received an epidural  Has had late decels  Tried turning off pitocin  Improved for a bit then decels returned  Fetal intolerance of labor  3/70/-2  Discussed with patient and   Informed consent obtained

## 2017-11-02 NOTE — PLAN OF CARE
Problem: Patient Care Overview  Goal: Plan of Care/Patient Progress Review  Outcome: Improving  VSS. Pain controlled with both oral and IV medication. Baby feeding well overnight. Sleepy at breast today. Frequent attempts and skin to skin encouraged. Encouraged to call with needs and questions. Continue to monitor.

## 2017-11-02 NOTE — PLAN OF CARE
Problem: Patient Care Overview  Goal: Plan of Care/Patient Progress Review  Outcome: No Change  Pt arrived to room 412 at about 0330. VSS. FF/1. Pt denied pain for most of the shift. Pt did complain of pain and cramping with breastfeeding. Tylenol given with not much relief. Dilaudid 0.3mg given and after that dose, pt complained of nausea. Pt assisted with breastfeeding and then pt requested baby to go to the nursery until the next feeding. Pt tolerating ice chips. Heating pad applied to abdomen. Encouraged pt to call with needs.

## 2017-11-03 LAB
ABO + RH BLD: NORMAL
ABO + RH BLD: NORMAL
BLOOD BANK CMNT PATIENT-IMP: NORMAL
DATE RH IMM GL GVN: NORMAL
FETAL CELL SCN BLD QL ROSETTE: NORMAL
HGB BLD-MCNC: 9.4 G/DL (ref 11.7–15.7)
RH IG VIALS RECOM PATIENT: NORMAL

## 2017-11-03 PROCEDURE — 25000132 ZZH RX MED GY IP 250 OP 250 PS 637: Performed by: OBSTETRICS & GYNECOLOGY

## 2017-11-03 PROCEDURE — 12000037 ZZH R&B POSTPARTUM INTERMEDIATE

## 2017-11-03 PROCEDURE — 86900 BLOOD TYPING SEROLOGIC ABO: CPT | Performed by: OBSTETRICS & GYNECOLOGY

## 2017-11-03 PROCEDURE — 25000128 H RX IP 250 OP 636: Performed by: OBSTETRICS & GYNECOLOGY

## 2017-11-03 PROCEDURE — 36415 COLL VENOUS BLD VENIPUNCTURE: CPT | Performed by: OBSTETRICS & GYNECOLOGY

## 2017-11-03 PROCEDURE — 85461 HEMOGLOBIN FETAL: CPT | Performed by: OBSTETRICS & GYNECOLOGY

## 2017-11-03 PROCEDURE — 86901 BLOOD TYPING SEROLOGIC RH(D): CPT | Performed by: OBSTETRICS & GYNECOLOGY

## 2017-11-03 PROCEDURE — 85018 HEMOGLOBIN: CPT | Performed by: OBSTETRICS & GYNECOLOGY

## 2017-11-03 RX ADMIN — IBUPROFEN 800 MG: 400 TABLET ORAL at 08:28

## 2017-11-03 RX ADMIN — OXYCODONE HYDROCHLORIDE 5 MG: 5 TABLET ORAL at 17:29

## 2017-11-03 RX ADMIN — HUMAN RHO(D) IMMUNE GLOBULIN 300 MCG: 300 INJECTION, SOLUTION INTRAMUSCULAR at 13:21

## 2017-11-03 RX ADMIN — IBUPROFEN 800 MG: 400 TABLET ORAL at 02:03

## 2017-11-03 RX ADMIN — SENNOSIDES AND DOCUSATE SODIUM 1 TABLET: 8.6; 5 TABLET ORAL at 08:28

## 2017-11-03 RX ADMIN — ACETAMINOPHEN 975 MG: 325 TABLET, FILM COATED ORAL at 13:21

## 2017-11-03 RX ADMIN — OXYCODONE HYDROCHLORIDE 5 MG: 5 TABLET ORAL at 05:53

## 2017-11-03 RX ADMIN — OXYCODONE HYDROCHLORIDE 5 MG: 5 TABLET ORAL at 08:28

## 2017-11-03 RX ADMIN — OXYCODONE HYDROCHLORIDE 5 MG: 5 TABLET ORAL at 02:03

## 2017-11-03 RX ADMIN — ACETAMINOPHEN 975 MG: 325 TABLET, FILM COATED ORAL at 21:39

## 2017-11-03 RX ADMIN — SENNOSIDES AND DOCUSATE SODIUM 2 TABLET: 8.6; 5 TABLET ORAL at 20:27

## 2017-11-03 RX ADMIN — IBUPROFEN 800 MG: 400 TABLET ORAL at 17:29

## 2017-11-03 RX ADMIN — OXYCODONE HYDROCHLORIDE 5 MG: 5 TABLET ORAL at 13:21

## 2017-11-03 RX ADMIN — OXYCODONE HYDROCHLORIDE 5 MG: 5 TABLET ORAL at 20:27

## 2017-11-03 RX ADMIN — ACETAMINOPHEN 975 MG: 325 TABLET, FILM COATED ORAL at 05:52

## 2017-11-03 NOTE — PLAN OF CARE
Problem: Patient Care Overview  Goal: Plan of Care/Patient Progress Review  Outcome: No Change   Pain controlled with motrin,tylenol and oxycodone passing flatus up ambulating voidng ok incision with steri strips  breast feeding well  Pumping occasionally encourage to call with needs continue to monitor

## 2017-11-03 NOTE — PLAN OF CARE
Problem: Patient Care Overview  Goal: Plan of Care/Patient Progress Review  Outcome: Improving  VSS, Breastfeeding and pumping.  Fundus is firm at U/1, scant flow, no clots.  Incision is approximated, steri-strips intact, no drainage.  Pain controlled with Tylenol, Ibuprofen and Oxy (5).  Independent with cares.   is at bedside and supportive.  Will continue to monitor and support.

## 2017-11-03 NOTE — PROGRESS NOTES
Obstetrics Postpartum Rounding Note, POD#1    S: Doing well. Pain controlled. Minimal lochia.       O:   Vitals:    11/03/17 0248 11/03/17 0552 11/03/17 0553 11/03/17 0638   BP:       Pulse:       Resp: 18 18 18 18   Temp:       TempSrc:       SpO2:           Gen: AOx3, NAD  Abd: soft, ND, mild approp ttp, FF@ U-1. Incision with steri strips, no discharge/erythema  Ext: no calf ttp, trace b/l LE edema    Labs:         Hemoglobin   Date Value Ref Range Status   11/01/2017 10.7 (L) 11.7 - 15.7 g/dL Final   10/17/2017 11.5 (L) 11.7 - 15.7 g/dL Final            Lab Results   Component Value Date    RH Neg 11/01/2017       Meds:  Current Facility-Administered Medications   Medication     lidocaine 1 % 1 mL     lidocaine (LMX4) cream     sodium chloride (PF) 0.9% PF flush 3 mL     sodium chloride (PF) 0.9% PF flush 3 mL     oxytocin (PITOCIN) 30 units in 500 mL 0.9% NaCl infusion     dextrose 5% in lactated ringers infusion     naloxone (NARCAN) injection 0.1-0.4 mg     senna-docusate (SENOKOT-S;PERICOLACE) 8.6-50 MG per tablet 1-2 tablet     [START ON 11/4/2017] bisacodyl (DULCOLAX) Suppository 10 mg     [START ON 11/4/2017] sodium phosphate (FLEET ENEMA) 1 enema     ondansetron (ZOFRAN) injection 4 mg     hydrocortisone 2.5 % cream     diphenhydrAMINE (BENADRYL) capsule 25 mg    Or     diphenhydrAMINE (BENADRYL) injection 25 mg     lanolin ointment     simethicone (MYLICON) chewable tablet 80 mg     lactated ringers BOLUS 1,000 mL     oxytocin (PITOCIN) 30 units in 500 mL 0.9% NaCl infusion     oxytocin (PITOCIN) injection 10 Units     misoprostol (CYTOTEC) tablet 400 mcg     Blood Bank will determine if patient is eligible for and the proper dosage of rho (D) immune globulin     acetaminophen (TYLENOL) tablet 975 mg     [START ON 11/5/2017] acetaminophen (TYLENOL) tablet 650 mg     oxyCODONE (ROXICODONE) IR tablet 5-10 mg     ibuprofen (ADVIL/MOTRIN) tablet 400-800 mg     HYDROmorphone (PF) (DILAUDID) injection 0.3-0.5  mg     No MMR Needed -  Assessment: Patient does not need MMR vaccine     No Tdap Needed - Assessment: Patient does not need Tdap vaccine     sodium chloride (PF) 0.9% PF flush 3 mL       A/P: POD#1 s/p 1LTCS doing well.  -Continue routine care.  -Anticipate discharge day 3-4    Aide Huis, DO  November 3, 2017  7:20 AM

## 2017-11-04 PROCEDURE — 12000037 ZZH R&B POSTPARTUM INTERMEDIATE

## 2017-11-04 PROCEDURE — 25000132 ZZH RX MED GY IP 250 OP 250 PS 637: Performed by: OBSTETRICS & GYNECOLOGY

## 2017-11-04 RX ADMIN — SENNOSIDES AND DOCUSATE SODIUM 2 TABLET: 8.6; 5 TABLET ORAL at 22:24

## 2017-11-04 RX ADMIN — OXYCODONE HYDROCHLORIDE 5 MG: 5 TABLET ORAL at 18:59

## 2017-11-04 RX ADMIN — IBUPROFEN 800 MG: 400 TABLET ORAL at 10:07

## 2017-11-04 RX ADMIN — ACETAMINOPHEN 975 MG: 325 TABLET, FILM COATED ORAL at 15:39

## 2017-11-04 RX ADMIN — OXYCODONE HYDROCHLORIDE 5 MG: 5 TABLET ORAL at 13:23

## 2017-11-04 RX ADMIN — OXYCODONE HYDROCHLORIDE 5 MG: 5 TABLET ORAL at 07:33

## 2017-11-04 RX ADMIN — OXYCODONE HYDROCHLORIDE 5 MG: 5 TABLET ORAL at 03:02

## 2017-11-04 RX ADMIN — IBUPROFEN 800 MG: 400 TABLET ORAL at 16:26

## 2017-11-04 RX ADMIN — OXYCODONE HYDROCHLORIDE 5 MG: 5 TABLET ORAL at 16:26

## 2017-11-04 RX ADMIN — IBUPROFEN 800 MG: 400 TABLET ORAL at 03:02

## 2017-11-04 RX ADMIN — IBUPROFEN 800 MG: 400 TABLET ORAL at 22:24

## 2017-11-04 RX ADMIN — SENNOSIDES AND DOCUSATE SODIUM 2 TABLET: 8.6; 5 TABLET ORAL at 07:33

## 2017-11-04 RX ADMIN — OXYCODONE HYDROCHLORIDE 5 MG: 5 TABLET ORAL at 22:24

## 2017-11-04 RX ADMIN — ACETAMINOPHEN 975 MG: 325 TABLET, FILM COATED ORAL at 07:33

## 2017-11-04 NOTE — PROGRESS NOTES
S: Pt doing well. Infant is being . Pain is well controlled.    O: /74  Pulse 79  Temp 98.1  F (36.7  C) (Oral)  Resp 16  LMP 01/30/2017 (Approximate)  SpO2 98%  Breastfeeding? Unknown        ABD:  Uterine fundus is firm, non-tender and at the level of the umbilicus       INC: clean/dry/intact                Hemoglobin   Date Value Ref Range Status   11/03/2017 9.4 (L) 11.7 - 15.7 g/dL Final            Lab Results   Component Value Date    RH Neg 11/03/2017       A: Post-op Day #2 s/p C/Section    P: Continue Post Op Cares

## 2017-11-04 NOTE — PLAN OF CARE
Problem: Patient Care Overview  Goal: Plan of Care/Patient Progress Review  Outcome: No Change  VSS. Bleeding wnl. Pt up and ambulating in room, caring for self and infant with support of her . Voiding adequately. Pain well managed with tylenol, ibuprofen and oxycodone.

## 2017-11-04 NOTE — PLAN OF CARE
Problem: Patient Care Overview  Goal: Plan of Care/Patient Progress Review  Outcome: Improving  Vitals within defined limits. Fundus firm. Lochia scant.  incision clean, dry, intact. Incisional pain adequately controlled with scheduled and PRN pain meds. Hydrogels introduced for tender nipples - patient reports relief. Tolerating regular diet. Infant has been cluster feeding overnight. Discussed hand expression. Voiding without issues. Will continue to monitor.

## 2017-11-04 NOTE — LACTATION NOTE
Follow up visit. Infant has been feeding well.  Cluster fed overnight.  An feels feedings are going well and has no concerns or questions today.  Will continue to follow.  Brenda Campuzano  RN, IBCLC

## 2017-11-05 VITALS
HEART RATE: 79 BPM | SYSTOLIC BLOOD PRESSURE: 124 MMHG | TEMPERATURE: 98.1 F | OXYGEN SATURATION: 98 % | RESPIRATION RATE: 16 BRPM | DIASTOLIC BLOOD PRESSURE: 80 MMHG

## 2017-11-05 PROCEDURE — 25000132 ZZH RX MED GY IP 250 OP 250 PS 637: Performed by: OBSTETRICS & GYNECOLOGY

## 2017-11-05 RX ORDER — OXYCODONE HYDROCHLORIDE 5 MG/1
5 TABLET ORAL EVERY 6 HOURS PRN
Qty: 30 TABLET | Refills: 0 | Status: SHIPPED | OUTPATIENT
Start: 2017-11-05 | End: 2017-12-27

## 2017-11-05 RX ADMIN — IBUPROFEN 800 MG: 400 TABLET ORAL at 13:25

## 2017-11-05 RX ADMIN — OXYCODONE HYDROCHLORIDE 5 MG: 5 TABLET ORAL at 06:45

## 2017-11-05 RX ADMIN — ACETAMINOPHEN 975 MG: 325 TABLET, FILM COATED ORAL at 00:01

## 2017-11-05 RX ADMIN — IBUPROFEN 800 MG: 400 TABLET ORAL at 06:45

## 2017-11-05 RX ADMIN — OXYCODONE HYDROCHLORIDE 5 MG: 5 TABLET ORAL at 13:26

## 2017-11-05 RX ADMIN — SENNOSIDES AND DOCUSATE SODIUM 1 TABLET: 8.6; 5 TABLET ORAL at 13:25

## 2017-11-05 RX ADMIN — ACETAMINOPHEN 650 MG: 325 TABLET, FILM COATED ORAL at 13:25

## 2017-11-05 RX ADMIN — OXYCODONE HYDROCHLORIDE 5 MG: 5 TABLET ORAL at 01:45

## 2017-11-05 NOTE — PLAN OF CARE
Problem: Patient Care Overview  Goal: Plan of Care/Patient Progress Review  Outcome: Improving  Vitals within defined limits. Fundus firm. Lochia scant.  incision clean, dry, intact. Incisional pain adequately controlled with scheduled and PRN pain meds. Hydrogels in continued use for tender nipples - patient reports relief. Tolerating regular diet. Infant has been cluster feeding overnight. Due to infant's weight loss, started breast pumping and supplementing with donor breast milk via SNS overnight. Will continue to monitor.

## 2017-11-05 NOTE — LACTATION NOTE
Follow up visit.  Infant at 10% weight loss.  Breast feeding well then supplementing after with ebm and donor milk via finger feeding.  Kay is pumping and getting drops of colostrum.  Discussed supplement for home, An is looking into purchasing donor milk.  Reviewed outpatient lactation resources for use upon discharge.    Brenda Campuzano  RN, IBCLC

## 2017-11-05 NOTE — PLAN OF CARE
Problem: Patient Care Overview  Goal: Plan of Care/Patient Progress Review  Outcome: No Change  VSS. Bleeding wnl. Voiding adequate amounts. Up ad lilly and tolerating well. Caring for self and infant with support of . Pain well managed, plan is to continue on current regimen at home. Anticipate discharge this afternoon.

## 2017-11-05 NOTE — PLAN OF CARE
Problem: Patient Care Overview  Goal: Plan of Care/Patient Progress Review  Patient meeting expected goals for this shift. Using tylenol, motrin, and oxycodone for pain/comfort. Up and ambulating. Independent in all self care during shift. Working on breastfeeding  and  cares. Support person present at bedside and supportive.  Positive bonding behaviors observed.  Continue to monitor and notify MD as needed.

## 2017-11-05 NOTE — PROGRESS NOTES
S: Pt doing well. Infant is being . Pain is well controlled.    O: /89  Pulse 79  Temp 98  F (36.7  C) (Oral)  Resp 16  LMP 01/30/2017 (Approximate)  SpO2 98%  Breastfeeding? Unknown        ABD:  Uterine fundus is firm, non-tender and at the level of the umbilicus       INC: clean/dry/intact                Hemoglobin   Date Value Ref Range Status   11/03/2017 9.4 (L) 11.7 - 15.7 g/dL Final            Lab Results   Component Value Date    RH Neg 11/03/2017       A: Post-op Day #3 s/p C/Section    P: Continue Post Op Cares   discharge

## 2017-11-22 ENCOUNTER — TELEPHONE (OUTPATIENT)
Dept: PEDIATRICS | Facility: CLINIC | Age: 37
End: 2017-11-22

## 2017-11-22 DIAGNOSIS — O92.3 LACTATION FAILURE: Primary | ICD-10-CM

## 2017-12-26 NOTE — DISCHARGE SUMMARY
DATE OF ADMISSION:   2017   DATE OF DISCHARGE:  2017      HISTORY:  The patient An Maya is a 37-year-old female, a  1, para 0 at 39-3/7 weeks' gestation who presented to the hospital with ruptured membranes.        HOSPITAL COURSE:  An's cervix was unfavorable on admission, so she underwent cervical ripening and then Pitocin induction.  During labor fetal intolerance of labor developed with persistent decelerations remote from delivery.  Therefore the decision was made to proceed with a  section.        An underwent a  section on 2017, and at the time of surgery there was a tight nuchal cord that was twice wrapped around the baby's neck.  The baby was a male.  The amniotic fluid was clear.        An did well postoperatively.  Her discharge hemoglobin was 10.7, and she was discharged home on postoperative day #3 to return to our Clinic in 6 weeks for a postpartum check.         DANYA DALEY MD             D: 2017 07:42   T: 2017 08:06   MT: MARCIA#[id number]      Name:     AN MAYA   MRN:      [id number]        Account:        [id number]   :      1980           Admit Date:                                       Discharge Date: 2017      Document: [id number]

## 2017-12-27 ENCOUNTER — PRENATAL OFFICE VISIT (OUTPATIENT)
Dept: OBGYN | Facility: CLINIC | Age: 37
End: 2017-12-27
Payer: COMMERCIAL

## 2017-12-27 VITALS
DIASTOLIC BLOOD PRESSURE: 64 MMHG | WEIGHT: 179 LBS | SYSTOLIC BLOOD PRESSURE: 104 MMHG | HEIGHT: 67 IN | BODY MASS INDEX: 28.09 KG/M2 | HEART RATE: 72 BPM

## 2017-12-27 PROBLEM — O09.519 ELDERLY PRIMIGRAVIDA, ANTEPARTUM: Status: RESOLVED | Noted: 2017-03-27 | Resolved: 2017-12-27

## 2017-12-27 PROCEDURE — 99207 ZZC POST PARTUM EXAM: CPT | Performed by: OBSTETRICS & GYNECOLOGY

## 2017-12-27 ASSESSMENT — ANXIETY QUESTIONNAIRES
3. WORRYING TOO MUCH ABOUT DIFFERENT THINGS: NOT AT ALL
1. FEELING NERVOUS, ANXIOUS, OR ON EDGE: NOT AT ALL
2. NOT BEING ABLE TO STOP OR CONTROL WORRYING: NOT AT ALL
IF YOU CHECKED OFF ANY PROBLEMS ON THIS QUESTIONNAIRE, HOW DIFFICULT HAVE THESE PROBLEMS MADE IT FOR YOU TO DO YOUR WORK, TAKE CARE OF THINGS AT HOME, OR GET ALONG WITH OTHER PEOPLE: NOT DIFFICULT AT ALL
GAD7 TOTAL SCORE: 0
5. BEING SO RESTLESS THAT IT IS HARD TO SIT STILL: NOT AT ALL
7. FEELING AFRAID AS IF SOMETHING AWFUL MIGHT HAPPEN: NOT AT ALL
6. BECOMING EASILY ANNOYED OR IRRITABLE: NOT AT ALL

## 2017-12-27 ASSESSMENT — PATIENT HEALTH QUESTIONNAIRE - PHQ9
SUM OF ALL RESPONSES TO PHQ QUESTIONS 1-9: 0
5. POOR APPETITE OR OVEREATING: NOT AT ALL

## 2017-12-27 NOTE — PROGRESS NOTES
"  SUBJECTIVE:  An Roman,  is here for a postpartum visit.  She had a  Section  on 17 delivering a healthy baby boy, named Alec weighing 7 lbs 2.6 oz at term.      HPI:  No concerns  Incision healing fine  Plans to use spermicide    Last PHQ-9 score on record=   PHQ-9 SCORE 2017   Total Score 0     ZACKERY-7 SCORE 3/27/2017 2017   Total Score 0 0       Delivery complications:  No  Breast feeding:  Yes, no issues  Bladder problems:  No  Bowel problems/hemorrhoids:  No  Episiotomy/laceration/incision healed? Yes: no issues  Vaginal flow:  None  [place]:  Not currently  Contraception: Conceptrol  Emotional adjustment:  doing well and happy  Back to work:  Self employed    12 point review of systems negative other than symptoms noted below.    OBJECTIVE:  Vitals: /64  Pulse 72  Ht 5' 6.5\" (1.689 m)  Wt 179 lb (81.2 kg)  LMP 2017 (Approximate)  BMI 28.46 kg/m2  BMI= Body mass index is 28.46 kg/(m^2).  General - pleasant female in no acute distress.  Breast -  deferred  Abdomen - Incision well-healed        ASSESSMENT:    ICD-10-CM    1. Postpartum examination following  delivery Z39.2        PLAN:  May resume normal activities without restrictions.  Pap smear was not done today.    Full counseling was provided, and all questions were answered.   Return to clinic in one year for an annual visit.     There are no Patient Instructions on file for this visit.  Caryn Lozano MD      "

## 2017-12-27 NOTE — MR AVS SNAPSHOT
"              After Visit Summary   2017    An Roman    MRN: [id number]           Patient Information     Date Of Birth          1980        Visit Information        Provider Department      2017 9:50 AM Caryn Lozano MD [facility]        Today's Diagnoses     Postpartum examination following  delivery    -  1       Follow-ups after your visit        Who to contact     If you have questions or need follow up information about today's clinic visit or your schedule please contact [facility] directly at [phone].  Normal or non-critical lab and imaging results will be communicated to you by [employer], letter or phone within 4 business days after the clinic has received the results. If you do not hear from us within 7 days, please contact the clinic through [email or web address] or phone. If you have a critical or abnormal lab result, we will notify you by phone as soon as possible.  Submit refill requests through DEQ or call your pharmacy and they will forward the refill request to us. Please allow 3 business days for your refill to be completed.          Additional Information About Your Visit        MyChart Information     DEQ lets you send messages to your doctor, view your test results, renew your prescriptions, schedule appointments and more. To sign up, go to [email or web address] . Click on \"Log in\" on the left side of the screen, which will take you to the Welcome page. Then click on \"Sign up Now\" on the right side of the page.     You will be asked to enter the access code listed below, as well as some personal information. Please follow the directions to create your username and password.     Your access code is: [email or web address]  Expires: 1/15/2018  8:47 AM     Your access code will  in 90 days. If you need help or a new code, please call your [facility] or [phone].        Care EveryWhere ID     This is your Care " "EveryWhere ID. This could be used by other organizations to access your [facility] medical records  [id number]        Your Vitals Were     Pulse Height Last Period BMI (Body Mass Index)          72 5' 6.5\" (1.689 m) 01/30/2017 (Approximate) 28.46 kg/m2         Blood Pressure from Last 3 Encounters:   12/27/17 104/64   11/05/17 124/80   10/31/17 124/70    Weight from Last 3 Encounters:   12/27/17 179 lb (81.2 kg)   10/31/17 197 lb 9.6 oz (89.6 kg)   10/24/17 199 lb 6.4 oz (90.4 kg)              Today, you had the following     No orders found for display       Primary Care Provider Office Phone # Fax #    [facility] [phone]       [address] [place]  [address] [address]        Equal Access to Services     MYRANDA MARTINEZ : Hadii aad ku hadasho Soomaali, waaxda luqadaha, qaybta kaalmada adeegyada, waxay maryin santa zhao . So [facility] [phone].    ATENCIÓN: Si habla español, tiene a hsu disposición servicios gratuitos de asistencia lingüística. Llame al [phone].    We comply with applicable federal civil rights laws and [place] laws. We do not discriminate on the basis of race, color, national origin, age, disability, sex, sexual orientation, or gender identity.            Thank you!     Thank you for choosing [facility]  for your care. Our goal is always to provide you with excellent care. Hearing back from our patients is one way we can continue to improve our services. Please take a few minutes to complete the written survey that you may receive in the mail after your visit with us. Thank you!             Your Updated Medication List - Protect others around you: Learn how to safely use, store and throw away your medicines at www.disposemymeds.org.          This list is accurate as of: 12/27/17 10:18 AM.  Always use your most recent med list.                   Brand Name Dispense " Instructions for use Diagnosis    prenatal multivitamin plus iron 27-0.8 MG Tabs per tablet      Take 1 tablet by mouth daily        SYMPHONY DOUBLE PUMPING SYSTEM Misc     1 each    1 pump as needed    Lactation failure

## 2017-12-28 ASSESSMENT — ANXIETY QUESTIONNAIRES: GAD7 TOTAL SCORE: 0

## 2018-02-12 ENCOUNTER — TELEPHONE (OUTPATIENT)
Dept: NURSING | Facility: CLINIC | Age: 38
End: 2018-02-12

## 2018-02-12 NOTE — TELEPHONE ENCOUNTER
C/section 11/2/17. Pt is having some problems with breast feeding. Requesting a lactation consultant. Pt has already been seen at baby's peds. [facility], [facility] center and [facility]. No further questions.

## 2018-04-30 ENCOUNTER — TELEPHONE (OUTPATIENT)
Dept: OTHER | Facility: CLINIC | Age: 38
End: 2018-04-30

## 2018-05-24 NOTE — TELEPHONE ENCOUNTER
5/24/2018    Call Regarding Onboarding are Choices    Attempt 2    Message on voicemail     Comments: 1 child      Outreach   CC

## 2018-08-02 ENCOUNTER — OFFICE VISIT (OUTPATIENT)
Dept: FAMILY MEDICINE | Facility: CLINIC | Age: 38
End: 2018-08-02
Payer: COMMERCIAL

## 2018-08-02 VITALS — SYSTOLIC BLOOD PRESSURE: 106 MMHG | OXYGEN SATURATION: 98 % | HEART RATE: 80 BPM | DIASTOLIC BLOOD PRESSURE: 71 MMHG

## 2018-08-02 DIAGNOSIS — D22.5 MELANOCYTIC NEVI OF TRUNK: ICD-10-CM

## 2018-08-02 DIAGNOSIS — L91.8 INFLAMED SKIN TAG: ICD-10-CM

## 2018-08-02 DIAGNOSIS — D48.5 NEOPLASM OF UNCERTAIN BEHAVIOR OF SKIN: Primary | ICD-10-CM

## 2018-08-02 PROCEDURE — 99213 OFFICE O/P EST LOW 20 MIN: CPT | Mod: 25 | Performed by: FAMILY MEDICINE

## 2018-08-02 PROCEDURE — 11200 RMVL SKIN TAGS UP TO&INC 15: CPT | Performed by: FAMILY MEDICINE

## 2018-08-02 NOTE — LETTER
8/2/2018         RE: An Roman  [address] [facility] [address]        Dear Colleague,    Thank you for referring your patient, An Roman, to the [facility] [place]. Please see a copy of my visit note below.    [facility] - PRIMARY CARE SKIN    CC : moles and skin exam  SUBJECTIVE:                                                    An Roman is a 37 year old female who presents to clinic today . She has some raised lesions that are irritated by the clothing that she would like removed.  Skin tags on the neck that are irritated.    Personal Medical History  Skin Cancer : NO  Eczema Psoriasis Rosacea Autoimmune   NO NO NO NO   .    Family Medical History  Skin Cancer : ?   Eczema Psoriasis Rosacea Autoimmune   YES - mother YES - mother NO NO   Other :   .    Sun Exposure History  Sunscreen Use :  yes  Occupation : indoor/ outdoor    Refer to electronic medical record (EMR) for past medical history and medications.      ROS : 14 point review of systems was negative except the symptoms listed above in the HPI.        OBJECTIVE:                                                    GENERAL: healthy, alert and no distress  SKIN: Alan Skin Type - II.  Scalp, Face, Neck, Trunk, Arms, Legs, Hands, Feet, Fingers, Toes and Buttocks were examined. The dermatoscope was used to help evaluate pigmented lesions.  Skin Pertinent Findings:      Left lateral face - 5 mm smooth , flesh colored lesion  T 4 - 1 cm to the right- 3 mm raised, brown lesion  T 6- 5 cm to the left - 5 mm raised brown lesion  Left medial knee- 6 mm flesh colored lesion                           Back, arms, legs- scattered benign brown macules of various sizes and shapes most consistent with (benign) melanocytic nevi                         Neck, left thigh- 1-2 mm in size, brown, pedunculated, benign-appearing skin tag(s)                                  Name : Liquid Nitrogen Cry-Ac  Cryotherapy.  Indication : Pre-malignant lesion.    Completed by : Arely Khan MD  Note : Discussed natural history of lesion and treatment options. Prior to treatment, we discussed inflammation, tenderness post-procedure, the healing process, and the risks of pain, infection, scarring, blistering, and hypo-/hyperpigmentation after healing. Explained that these lesions may grow back and may need additional treatment or re-treatment. The patient expressed a desire to proceed with cryotherapy.    The lesion(s) was treated with liquid nitrogen Cry-Ac, five second freeze repeated twice with a pause to allow for the area to thaw. If this lesion should recur, then it needs to be re-evaluated.    Patient tolerated the procedure well and left in good condition.  Total number of lesions treated : 5.            ASSESSMENT:                                                      Encounter Diagnoses   Name Primary?     Neoplasm of uncertain behavior of skin Yes     Melanocytic nevi of trunk      Inflamed skin tag            PLAN:                                                    Patient Instructions   Schedule for appointment for shave removal if the 4 lesions    CRYOTHERAPY POST-TREATMENT CARE INSTRUCTIONS  Liquid nitrogen is mildly uncomfortable when applied to the skin, but the discomfort rapidly subsides.    Post-Treatment:  You may experience burning and/or stinging immediately following the procedure. The discomfort from the procedure may persist over the next 12-24 hours. The area treated will look pinker and slightly swollen before the healing process begins. You may also notice redness, swelling, tenderness, weeping and crusts or scabs. Healing time is approximately 10-14 days.    Blister - You may or may notice blistering from the freezing. If you develop an uncomfortable blister from today's treatment, you may gently puncture this with a needle that has been cleaned with alcohol. However, do not remove the protective  skin layer of the blister.    Scab - After a few days, you may notice scaliness or scab formation. Do not pick at the scabs because this may cause slower healing and a permanent scar.    The skin may appear temporarily darker at the treatment site, but this usually fades over a period of months, provided that the area is protected from the sun.    Care of the areas treated:    Wash the area with a mild cleanser.    Gently pat dry.    Do not rub.     Keep protected from the sun during the healing process and for a full year following treatment as the skin continues to remodel during this time.    If you experience dryness or persistent burning, you may use Vaseline or Aquaphor ointment sparingly.    Do not use Neosporin, as many people eventually develop a medication allergy, that can easily be confused with an infection, to Neomycin.    Return if:  If there is any concern that the lesion has persisted, make an appointment for a re-check. Healing time does vary depending on your individual healing process and the area of the body treated. Most patients will be healed in one month.    Signs of Infection:  Thankfully this is rare. However if you notice persistent colored drainage, increasing pain, fever or other signs of infection, please call back at [phone].      Educational brochures given to patient : .      PROCEDURES:                                                    None.    TT : 25 minutes.  CT : 20 minutes.      The information in this document, created by the medical scribe for me, accurately reflects the services I personally performed and the decisions made by me. I have reviewed and approved this document for accuracy prior to leaving the patient care area.  Arely Khan MD August 2, 2018 3:17 PM  [facility]      Again, thank you for allowing me to participate in the care of your patient.        Sincerely,        Luci Khan MD

## 2018-08-02 NOTE — PATIENT INSTRUCTIONS
Schedule for appointment for shave removal if the 4 lesions    CRYOTHERAPY POST-TREATMENT CARE INSTRUCTIONS  Liquid nitrogen is mildly uncomfortable when applied to the skin, but the discomfort rapidly subsides.    Post-Treatment:  You may experience burning and/or stinging immediately following the procedure. The discomfort from the procedure may persist over the next 12-24 hours. The area treated will look pinker and slightly swollen before the healing process begins. You may also notice redness, swelling, tenderness, weeping and crusts or scabs. Healing time is approximately 10-14 days.    Blister - You may or may notice blistering from the freezing. If you develop an uncomfortable blister from today's treatment, you may gently puncture this with a needle that has been cleaned with alcohol. However, do not remove the protective skin layer of the blister.    Scab - After a few days, you may notice scaliness or scab formation. Do not pick at the scabs because this may cause slower healing and a permanent scar.    The skin may appear temporarily darker at the treatment site, but this usually fades over a period of months, provided that the area is protected from the sun.    Care of the areas treated:    Wash the area with a mild cleanser.    Gently pat dry.    Do not rub.     Keep protected from the sun during the healing process and for a full year following treatment as the skin continues to remodel during this time.    If you experience dryness or persistent burning, you may use Vaseline or Aquaphor ointment sparingly.    Do not use Neosporin, as many people eventually develop a medication allergy, that can easily be confused with an infection, to Neomycin.    Return if:  If there is any concern that the lesion has persisted, make an appointment for a re-check. Healing time does vary depending on your individual healing process and the area of the body treated. Most patients will be healed in one month.    Signs  of Infection:  Thankfully this is rare. However if you notice persistent colored drainage, increasing pain, fever or other signs of infection, please call back at [phone].

## 2018-08-02 NOTE — MR AVS SNAPSHOT
After Visit Summary   8/2/2018    An Roman    MRN: [id number]           Patient Information     Date Of Birth          1980        Visit Information        Provider Department      8/2/2018 3:00 PM Luci Khan MD [facility]        Today's Diagnoses     Neoplasm of uncertain behavior of skin    -  1    Melanocytic nevi of trunk        Inflamed skin tag          Care Instructions    Schedule for appointment for shave removal if the 4 lesions    CRYOTHERAPY POST-TREATMENT CARE INSTRUCTIONS  Liquid nitrogen is mildly uncomfortable when applied to the skin, but the discomfort rapidly subsides.    Post-Treatment:  You may experience burning and/or stinging immediately following the procedure. The discomfort from the procedure may persist over the next 12-24 hours. The area treated will look pinker and slightly swollen before the healing process begins. You may also notice redness, swelling, tenderness, weeping and crusts or scabs. Healing time is approximately 10-14 days.    Blister - You may or may notice blistering from the freezing. If you develop an uncomfortable blister from today's treatment, you may gently puncture this with a needle that has been cleaned with alcohol. However, do not remove the protective skin layer of the blister.    Scab - After a few days, you may notice scaliness or scab formation. Do not pick at the scabs because this may cause slower healing and a permanent scar.    The skin may appear temporarily darker at the treatment site, but this usually fades over a period of months, provided that the area is protected from the sun.    Care of the areas treated:    Wash the area with a mild cleanser.    Gently pat dry.    Do not rub.     Keep protected from the sun during the healing process and for a full year following treatment as the skin continues to remodel during this time.    If you experience dryness or persistent burning,  you may use Vaseline or Aquaphor ointment sparingly.    Do not use Neosporin, as many people eventually develop a medication allergy, that can easily be confused with an infection, to Neomycin.    Return if:  If there is any concern that the lesion has persisted, make an appointment for a re-check. Healing time does vary depending on your individual healing process and the area of the body treated. Most patients will be healed in one month.    Signs of Infection:  Thankfully this is rare. However if you notice persistent colored drainage, increasing pain, fever or other signs of infection, please call back at [phone].            Follow-ups after your visit        Who to contact     If you have questions or need follow up information about today's clinic visit or your schedule please contact [facility] [place] directly at [phone].  Normal or non-critical lab and imaging results will be communicated to you by MyChart, letter or phone within 4 business days after the clinic has received the results. If you do not hear from us within 7 days, please contact the clinic through MyChart or phone. If you have a critical or abnormal lab result, we will notify you by phone as soon as possible.  Submit refill requests through [employer] or call your pharmacy and they will forward the refill request to us. Please allow 3 business days for your refill to be completed.          Additional Information About Your Visit        Care EveryWhere ID     This is your Care EveryWhere ID. This could be used by other organizations to access your [facility] medical records  [id number]        Your Vitals Were     Pulse Pulse Oximetry                80 98%           Blood Pressure from Last 3 Encounters:   08/02/18 106/71   12/27/17 104/64   11/05/17 124/80    Weight from Last 3 Encounters:   12/27/17 179 lb (81.2 kg)   10/31/17 197 lb 9.6 oz (89.6 kg)   10/24/17 199 lb 6.4 oz (90.4 kg)              Today, you had the  following     No orders found for display       Primary Care Provider Office Phone # Fax #    [facility] [phone]       [facility] [place] [address]  [facility] [address]        Equal Access to Services     MYRANDA MARTINEZ : Hadii aad ku hadasho Soomaali, waaxda luqadaha, qaybta kaalmada adeegyada, waxay maryin hayaan adedany nurajohn arambula. So [facility] [phone].    ATENCIÓN: Si habla español, tiene a hsu disposición servicios gratuitos de asistencia lingüística. Annaame al [phone].    We comply with applicable federal civil rights laws and [place] laws. We do not discriminate on the basis of race, color, national origin, age, disability, sex, sexual orientation, or gender identity.            Thank you!     Thank you for choosing [facility] [place]  for your care. Our goal is always to provide you with excellent care. Hearing back from our patients is one way we can continue to improve our services. Please take a few minutes to complete the written survey that you may receive in the mail after your visit with us. Thank you!             Your Updated Medication List - Protect others around you: Learn how to safely use, store and throw away your medicines at www.disposemymeds.org.          This list is accurate as of 8/2/18  3:41 PM.  Always use your most recent med list.                   Brand Name Dispense Instructions for use Diagnosis    prenatal multivitamin plus iron 27-0.8 MG Tabs per tablet      Take 1 tablet by mouth daily        SYMPHONY DOUBLE PUMPING SYSTEM Misc     1 each    1 pump as needed    Lactation failure

## 2018-09-17 ENCOUNTER — OFFICE VISIT (OUTPATIENT)
Dept: FAMILY MEDICINE | Facility: CLINIC | Age: 38
End: 2018-09-17
Payer: COMMERCIAL

## 2018-09-17 VITALS — DIASTOLIC BLOOD PRESSURE: 70 MMHG | SYSTOLIC BLOOD PRESSURE: 110 MMHG | HEART RATE: 76 BPM | OXYGEN SATURATION: 98 %

## 2018-09-17 DIAGNOSIS — D48.5 NEOPLASM OF UNCERTAIN BEHAVIOR OF SKIN: Primary | ICD-10-CM

## 2018-09-17 PROCEDURE — 11301 SHAVE SKIN LESION 0.6-1.0 CM: CPT | Performed by: FAMILY MEDICINE

## 2018-09-17 PROCEDURE — 11300 SHAVE SKIN LESION 0.5 CM/<: CPT | Mod: 59 | Performed by: FAMILY MEDICINE

## 2018-09-17 PROCEDURE — 11300 SHAVE SKIN LESION 0.5 CM/<: CPT | Mod: 51 | Performed by: FAMILY MEDICINE

## 2018-09-17 PROCEDURE — 11310 SHAVE SKIN LESION 0.5 CM/<: CPT | Mod: 51 | Performed by: FAMILY MEDICINE

## 2018-09-17 PROCEDURE — 88305 TISSUE EXAM BY PATHOLOGIST: CPT | Mod: TC | Performed by: FAMILY MEDICINE

## 2018-09-17 NOTE — LETTER
9/17/2018         RE: An Roman  [address] [facility] [address]        Dear Colleague,    Thank you for referring your patient, An Roman, to the [facility] [place]. Please see a copy of my visit note below.    [facility] - PRIMARY CARE SKIN    CC : lesions  SUBJECTIVE:                                                    An Roman is a 37 year old female who presents to clinic today for excision of various lesions on the face, back and left leg. She has some raised lesions that are irritated by the clothing that she would like removed.    Personal Medical History  Skin Cancer : NO  Eczema Psoriasis Rosacea Autoimmune   NO NO NO NO     Family Medical History  Skin Cancer : ?   Eczema Psoriasis Rosacea Autoimmune   YES - mother YES - mother NO NO     Sun Exposure History  Sunscreen Use :  yes    Occupation : indoor/ outdoor    Refer to electronic medical record (EMR) for past medical history and medications.    ROS : 14 point review of systems was negative except the symptoms listed above in the HPI.    This document serves as a record of the services and decisions personally performed and made by Arely Khan MD. It was created on her behalf by Maxim Garza, a trained medical scribe.  The creation of this document is based on the scribe's personal observations and the provider's statements to the medical scribe.  Maxim Garza, September 17, 2018 11:04 AM      OBJECTIVE:                                                    GENERAL: healthy, alert and no distress  SKIN: Alan Skin Type - II.  Face, Trunk, Leg examined. The dermatoscope was used to help evaluate pigmented lesions.  Skin Pertinent Findings:  Left lateral face : 5 mm smooth, flesh colored lesion.  Back, 1 cm right of T4 : 3 mm raised, brown lesion.  Back, 5 cm left of T6 :  5 mm raised brown lesion  Back, left of C7 : 5 x 4 mm in size light brown raised lesion.  Left medial knee : 6 mm  "flesh colored lesion          ASSESSMENT:                                                      Encounter Diagnosis   Name Primary?     Neoplasm of uncertain behavior of skin Yes         PLAN:                                                    Patient Instructions   FUTURE APPOINTMENTS  Follow up per pathology report.    WOUND CARE INSTRUCTIONS  1. Wash hands before every dressing change.  2. After 24 hours, change dressing daily.  3. Wash the wound area with a mild soap, then rinse.  4. Gently pat dry with a sterile gauze or Q-tip.  5. Using a Q-tip, apply Vaseline or Aquaphor only over entire wound. Do NOT use Neosporin - as many people react to neomycin.  6. Finally, cover with a bandage or sterile non-stick gauze with micropore paper tape.  7. Repeat once daily until wound has healed.      Soap, water and shampoo will not hurt this area.    Do not go swimming or take baths, but showering is encouraged.    Limit use of the area where the procedure was done for a few days to allow for optimal healing.    If you experience bleeding:  Wash hands and hold firm pressure on the area for 10 minutes without checking to see if the bleeding has stopped. \"Checking\" pulls off the protective wound clot and restarts the bleeding all over again. Re-apply pressure for 10 minutes if necessary to stop bleeding.  Use additional sterile gauze and tape to maintain pressure once bleeding has stopped.  If bleeding continues, then call back to clinic at [phone].    Signs of Infection:  Infection can occur in any area where skin has been disrupted.  If you notice persistent redness, swelling, colored drainage, increasing pain, fever or other signs of infection, please call us at: [phone] and ask to have me or my colleague paged. We will call you back to discuss.    Pathology Results:  You will be notified, generally via letter or MyChart, in approximately 10 days. If there is anything we need to discuss or further treatment " needed, I will call you to discuss it.    PATIENT INFORMATION : WOUNDS  During the healing process you will notice a number of changes. All wounds develop a small halo of redness surrounding the wound.  This means healing is occurring. Severe itching with extensive redness usually indicates sensitivity to the ointment or bandage tape used to dress the wound.  You should call our office if this develops.      Swelling  and/or discoloration around your surgical site is common, particularly when performed around the eye.    All wounds normally drain.  The larger the wound the more drainage there will be.  After 7-10 days, you will notice the wound beginning to shrink and new skin will begin to grow.  The wound is healed when you can see skin has formed over the entire area.  A healed wound has a healthy, shiny look to the surface and is red to dark pink in color to normalize.  Wounds may take approximately 4-6 weeks to heal.  Larger wounds may take 6-8 weeks. After the wound is healed you may discontinue dressing changes.    You may experience a sensation of tightness as your wound heals. This is normal and will gradually subside.    Your healed wound may be sensitive to temperature changes. This sensitivity improves with time, but if you re having a lot of discomfort, try to avoid temperature extremes.    Patients frequently experience itching after their wound appears to have healed because of the continue healing under the skin.  Plain Vaseline will help relieve the itching.        PROCEDURES:                                                    Name : Shave Excision  Indication : Excision of tissue for pathology evaluation.  Location(s) : Left lateral face : 5 mm smooth, flesh colored lesion..  Completed by : Arely Khan MD  Photo Taken : no.  Anesthesia : Patient was anesthetized by infiltrating the area surrounding the lesion with 1% lidocaine.   epinephrine [id number]:412826 : Yes.  Note : Discussed the risk of pain,  infection, scarring, hypo- or hyperpigmentation and recurrence or need for re-treatment. The benefits of treatment and alternative treatments were also discussed.    During this procedure, the universal protocol was utilized. The patient's identity was confirmed by no less than two patient identifiers, correct procedure was verified, correct site was verified and marked as applicable and a final pause was completed.    Sterile technique was used throughout the procedure. The skin was cleaned and prepped with surgical cleanser. Once adequate anesthesia was obtained, the lesion was removed with a deep scallop shave procedure. The specimen was sent to pathology.    Direct pressure and aluminum chloride and monopolar cautery was applied for hemostasis. No bleeding was present upon the completion of the procedure. The wound was coated with antibacterial ointment. A dry sterile dressing was applied. Patient tolerated the procedure well and left in satisfactory condition.    Primary provider and referring provider will be informed regarding the tissue report when it returns.    Name : Shave Excision  Indication : Excision of tissue for pathology evaluation.  Location(s) : Back, 1 cm right of T4 : 3 mm raised, brown lesion..  Completed by : Arely Khan MD  Photo Taken : no.  Anesthesia : Patient was anesthetized by infiltrating the area surrounding the lesion with 1% lidocaine.   epinephrine [id number] : Yes.  Note : Discussed the risk of pain, infection, scarring, hypo- or hyperpigmentation and recurrence or need for re-treatment. The benefits of treatment and alternative treatments were also discussed.    During this procedure, the universal protocol was utilized. The patient's identity was confirmed by no less than two patient identifiers, correct procedure was verified, correct site was verified and marked as applicable and a final pause was completed.    Sterile technique was used throughout the procedure. The skin was  cleaned and prepped with surgical cleanser. Once adequate anesthesia was obtained, the lesion was removed with a deep scallop shave procedure. The specimen was sent to pathology.    Direct pressure and aluminum chloride and monopolar cautery was applied for hemostasis. No bleeding was present upon the completion of the procedure. The wound was coated with antibacterial ointment. A dry sterile dressing was applied. Patient tolerated the procedure well and left in satisfactory condition.    Primary provider and referring provider will be informed regarding the tissue report when it returns.    Name : Shave Excision  Indication : Excision of tissue for pathology evaluation.  Location(s) : Back, 5 cm left of T6 :  5 mm raised brown lesion.  Completed by : Arely Khan MD  Photo Taken : no.  Anesthesia : Patient was anesthetized by infiltrating the area surrounding the lesion with 1% lidocaine.   epinephrine [id number] : Yes.  Note : Discussed the risk of pain, infection, scarring, hypo- or hyperpigmentation and recurrence or need for re-treatment. The benefits of treatment and alternative treatments were also discussed.    During this procedure, the universal protocol was utilized. The patient's identity was confirmed by no less than two patient identifiers, correct procedure was verified, correct site was verified and marked as applicable and a final pause was completed.    Sterile technique was used throughout the procedure. The skin was cleaned and prepped with surgical cleanser. Once adequate anesthesia was obtained, the lesion was removed with a deep scallop shave procedure. The specimen was sent to pathology.    Direct pressure and aluminum chloride and monopolar cautery was applied for hemostasis. No bleeding was present upon the completion of the procedure. The wound was coated with antibacterial ointment. A dry sterile dressing was applied. Patient tolerated the procedure well and left in satisfactory  condition.    Primary provider and referring provider will be informed regarding the tissue report when it returns.    Name : Shave Excision  Indication : Excision of tissue for pathology evaluation.  Location(s) : Back, left of C7 : 5 x 4 mm in size light brown raised lesion..  Completed by : Arely Khan MD  Photo Taken : no.  Anesthesia : Patient was anesthetized by infiltrating the area surrounding the lesion with 1% lidocaine.   epinephrine 1:[id number] : Yes.  Note : Discussed the risk of pain, infection, scarring, hypo- or hyperpigmentation and recurrence or need for re-treatment. The benefits of treatment and alternative treatments were also discussed.    During this procedure, the universal protocol was utilized. The patient's identity was confirmed by no less than two patient identifiers, correct procedure was verified, correct site was verified and marked as applicable and a final pause was completed.    Sterile technique was used throughout the procedure. The skin was cleaned and prepped with surgical cleanser. Once adequate anesthesia was obtained, the lesion was removed with a deep scallop shave procedure. The specimen was sent to pathology.    Direct pressure and aluminum chloride and monopolar cautery was applied for hemostasis. No bleeding was present upon the completion of the procedure. The wound was coated with antibacterial ointment. A dry sterile dressing was applied. Patient tolerated the procedure well and left in satisfactory condition.    Primary provider and referring provider will be informed regarding the tissue report when it returns.    Name : Shave Excision  Indication : Excision of tissue for pathology evaluation.  Location(s) : Left medial knee : 6 mm flesh colored lesion.  Completed by : Arely Khan MD  Photo Taken : no.  Anesthesia : Patient was anesthetized by infiltrating the area surrounding the lesion with 1% lidocaine.   epinephrine [id number] : Yes.  Note : Discussed the risk  of pain, infection, scarring, hypo- or hyperpigmentation and recurrence or need for re-treatment. The benefits of treatment and alternative treatments were also discussed.    During this procedure, the universal protocol was utilized. The patient's identity was confirmed by no less than two patient identifiers, correct procedure was verified, correct site was verified and marked as applicable and a final pause was completed.    Sterile technique was used throughout the procedure. The skin was cleaned and prepped with surgical cleanser. Once adequate anesthesia was obtained, the lesion was removed with a deep scallop shave procedure. The specimen was sent to pathology.    Direct pressure and aluminum chloride and monopolar cautery was applied for hemostasis. No bleeding was present upon the completion of the procedure. The wound was coated with antibacterial ointment. A dry sterile dressing was applied. Patient tolerated the procedure well and left in satisfactory condition.    Primary provider and referring provider will be informed regarding the tissue report when it returns.      The information in this document, created by the medical scribe for me, accurately reflects the services I personally performed and the decisions made by me. I have reviewed and approved this document for accuracy prior to leaving the patient care area.  Arely Khan MD September 17, 2018 11:03 AM  [facility] [place]    Again, thank you for allowing me to participate in the care of your patient.        Sincerely,        Luci Khan MD

## 2018-09-17 NOTE — PROGRESS NOTES
[facility] - PRIMARY CARE SKIN    CC : lesions  SUBJECTIVE:                                                    An Roman is a 37 year old female who presents to clinic today for excision of various lesions on the face, back and left leg. She has some raised lesions that are irritated by the clothing that she would like removed.    Personal Medical History  Skin Cancer : NO  Eczema Psoriasis Rosacea Autoimmune   NO NO NO NO     Family Medical History  Skin Cancer : ?   Eczema Psoriasis Rosacea Autoimmune   YES - mother YES - mother NO NO     Sun Exposure History  Sunscreen Use :  yes    Occupation : indoor/ outdoor    Refer to electronic medical record (EMR) for past medical history and medications.    ROS : 14 point review of systems was negative except the symptoms listed above in the HPI.    This document serves as a record of the services and decisions personally performed and made by Arely Khan MD. It was created on her behalf by Maxim Garza, a trained medical scribe.  The creation of this document is based on the scribe's personal observations and the provider's statements to the medical scribe.  Maxim Garza, September 17, 2018 11:04 AM      OBJECTIVE:                                                    GENERAL: healthy, alert and no distress  SKIN: Alan Skin Type - II.  Face, Trunk, Leg examined. The dermatoscope was used to help evaluate pigmented lesions.  Skin Pertinent Findings:  Left lateral face : 5 mm smooth, flesh colored lesion.  Back, 1 cm right of T4 : 3 mm raised, brown lesion.  Back, 5 cm left of T6 :  5 mm raised brown lesion  Back, left of C7 : 5 x 4 mm in size light brown raised lesion.  Left medial knee : 6 mm flesh colored lesion          ASSESSMENT:                                                      Encounter Diagnosis   Name Primary?     Neoplasm of uncertain behavior of skin Yes         PLAN:                                                    Patient Instructions  "  FUTURE APPOINTMENTS  Follow up per pathology report.    WOUND CARE INSTRUCTIONS  1. Wash hands before every dressing change.  2. After 24 hours, change dressing daily.  3. Wash the wound area with a mild soap, then rinse.  4. Gently pat dry with a sterile gauze or Q-tip.  5. Using a Q-tip, apply Vaseline or Aquaphor only over entire wound. Do NOT use Neosporin - as many people react to neomycin.  6. Finally, cover with a bandage or sterile non-stick gauze with micropore paper tape.  7. Repeat once daily until wound has healed.      Soap, water and shampoo will not hurt this area.    Do not go swimming or take baths, but showering is encouraged.    Limit use of the area where the procedure was done for a few days to allow for optimal healing.    If you experience bleeding:  Wash hands and hold firm pressure on the area for 10 minutes without checking to see if the bleeding has stopped. \"Checking\" pulls off the protective wound clot and restarts the bleeding all over again. Re-apply pressure for 10 minutes if necessary to stop bleeding.  Use additional sterile gauze and tape to maintain pressure once bleeding has stopped.  If bleeding continues, then call back to clinic at [phone].    Signs of Infection:  Infection can occur in any area where skin has been disrupted.  If you notice persistent redness, swelling, colored drainage, increasing pain, fever or other signs of infection, please call us at: [phone] and ask to have me or my colleague paged. We will call you back to discuss.    Pathology Results:  You will be notified, generally via letter or MyChart, in approximately 10 days. If there is anything we need to discuss or further treatment needed, I will call you to discuss it.    PATIENT INFORMATION : WOUNDS  During the healing process you will notice a number of changes. All wounds develop a small halo of redness surrounding the wound.  This means healing is occurring. Severe itching with " extensive redness usually indicates sensitivity to the ointment or bandage tape used to dress the wound.  You should call our office if this develops.      Swelling  and/or discoloration around your surgical site is common, particularly when performed around the eye.    All wounds normally drain.  The larger the wound the more drainage there will be.  After 7-10 days, you will notice the wound beginning to shrink and new skin will begin to grow.  The wound is healed when you can see skin has formed over the entire area.  A healed wound has a healthy, shiny look to the surface and is red to dark pink in color to normalize.  Wounds may take approximately 4-6 weeks to heal.  Larger wounds may take 6-8 weeks. After the wound is healed you may discontinue dressing changes.    You may experience a sensation of tightness as your wound heals. This is normal and will gradually subside.    Your healed wound may be sensitive to temperature changes. This sensitivity improves with time, but if you re having a lot of discomfort, try to avoid temperature extremes.    Patients frequently experience itching after their wound appears to have healed because of the continue healing under the skin.  Plain Vaseline will help relieve the itching.        PROCEDURES:                                                    Name : Shave Excision  Indication : Excision of tissue for pathology evaluation.  Location(s) : Left lateral face : 5 mm smooth, flesh colored lesion..  Completed by : Arely Khan MD  Photo Taken : no.  Anesthesia : Patient was anesthetized by infiltrating the area surrounding the lesion with 1% lidocaine.   epinephrine 1:088605 : Yes.  Note : Discussed the risk of pain, infection, scarring, hypo- or hyperpigmentation and recurrence or need for re-treatment. The benefits of treatment and alternative treatments were also discussed.    During this procedure, the universal protocol was utilized. The patient's identity was  confirmed by no less than two patient identifiers, correct procedure was verified, correct site was verified and marked as applicable and a final pause was completed.    Sterile technique was used throughout the procedure. The skin was cleaned and prepped with surgical cleanser. Once adequate anesthesia was obtained, the lesion was removed with a deep scallop shave procedure. The specimen was sent to pathology.    Direct pressure and aluminum chloride and monopolar cautery was applied for hemostasis. No bleeding was present upon the completion of the procedure. The wound was coated with antibacterial ointment. A dry sterile dressing was applied. Patient tolerated the procedure well and left in satisfactory condition.    Primary provider and referring provider will be informed regarding the tissue report when it returns.    Name : Shave Excision  Indication : Excision of tissue for pathology evaluation.  Location(s) : Back, 1 cm right of T4 : 3 mm raised, brown lesion..  Completed by : Arely Khan MD  Photo Taken : no.  Anesthesia : Patient was anesthetized by infiltrating the area surrounding the lesion with 1% lidocaine.   epinephrine 1:652313 : Yes.  Note : Discussed the risk of pain, infection, scarring, hypo- or hyperpigmentation and recurrence or need for re-treatment. The benefits of treatment and alternative treatments were also discussed.    During this procedure, the universal protocol was utilized. The patient's identity was confirmed by no less than two patient identifiers, correct procedure was verified, correct site was verified and marked as applicable and a final pause was completed.    Sterile technique was used throughout the procedure. The skin was cleaned and prepped with surgical cleanser. Once adequate anesthesia was obtained, the lesion was removed with a deep scallop shave procedure. The specimen was sent to pathology.    Direct pressure and aluminum chloride and monopolar cautery was  applied for hemostasis. No bleeding was present upon the completion of the procedure. The wound was coated with antibacterial ointment. A dry sterile dressing was applied. Patient tolerated the procedure well and left in satisfactory condition.    Primary provider and referring provider will be informed regarding the tissue report when it returns.    Name : Shave Excision  Indication : Excision of tissue for pathology evaluation.  Location(s) : Back, 5 cm left of T6 :  5 mm raised brown lesion.  Completed by : Arely Khan MD  Photo Taken : no.  Anesthesia : Patient was anesthetized by infiltrating the area surrounding the lesion with 1% lidocaine.   epinephrine 1:639983 : Yes.  Note : Discussed the risk of pain, infection, scarring, hypo- or hyperpigmentation and recurrence or need for re-treatment. The benefits of treatment and alternative treatments were also discussed.    During this procedure, the universal protocol was utilized. The patient's identity was confirmed by no less than two patient identifiers, correct procedure was verified, correct site was verified and marked as applicable and a final pause was completed.    Sterile technique was used throughout the procedure. The skin was cleaned and prepped with surgical cleanser. Once adequate anesthesia was obtained, the lesion was removed with a deep scallop shave procedure. The specimen was sent to pathology.    Direct pressure and aluminum chloride and monopolar cautery was applied for hemostasis. No bleeding was present upon the completion of the procedure. The wound was coated with antibacterial ointment. A dry sterile dressing was applied. Patient tolerated the procedure well and left in satisfactory condition.    Primary provider and referring provider will be informed regarding the tissue report when it returns.    Name : Shave Excision  Indication : Excision of tissue for pathology evaluation.  Location(s) : Back, left of C7 : 5 x 4 mm in size light  brown raised lesion..  Completed by : Arely Khan MD  Photo Taken : no.  Anesthesia : Patient was anesthetized by infiltrating the area surrounding the lesion with 1% lidocaine.   epinephrine 1:811521 : Yes.  Note : Discussed the risk of pain, infection, scarring, hypo- or hyperpigmentation and recurrence or need for re-treatment. The benefits of treatment and alternative treatments were also discussed.    During this procedure, the universal protocol was utilized. The patient's identity was confirmed by no less than two patient identifiers, correct procedure was verified, correct site was verified and marked as applicable and a final pause was completed.    Sterile technique was used throughout the procedure. The skin was cleaned and prepped with surgical cleanser. Once adequate anesthesia was obtained, the lesion was removed with a deep scallop shave procedure. The specimen was sent to pathology.    Direct pressure and aluminum chloride and monopolar cautery was applied for hemostasis. No bleeding was present upon the completion of the procedure. The wound was coated with antibacterial ointment. A dry sterile dressing was applied. Patient tolerated the procedure well and left in satisfactory condition.    Primary provider and referring provider will be informed regarding the tissue report when it returns.    Name : Shave Excision  Indication : Excision of tissue for pathology evaluation.  Location(s) : Left medial knee : 6 mm flesh colored lesion.  Completed by : Arely Khan MD  Photo Taken : no.  Anesthesia : Patient was anesthetized by infiltrating the area surrounding the lesion with 1% lidocaine.   epinephrine 1:880156 : Yes.  Note : Discussed the risk of pain, infection, scarring, hypo- or hyperpigmentation and recurrence or need for re-treatment. The benefits of treatment and alternative treatments were also discussed.    During this procedure, the universal protocol was utilized. The patient's identity  was confirmed by no less than two patient identifiers, correct procedure was verified, correct site was verified and marked as applicable and a final pause was completed.    Sterile technique was used throughout the procedure. The skin was cleaned and prepped with surgical cleanser. Once adequate anesthesia was obtained, the lesion was removed with a deep scallop shave procedure. The specimen was sent to pathology.    Direct pressure and aluminum chloride and monopolar cautery was applied for hemostasis. No bleeding was present upon the completion of the procedure. The wound was coated with antibacterial ointment. A dry sterile dressing was applied. Patient tolerated the procedure well and left in satisfactory condition.    Primary provider and referring provider will be informed regarding the tissue report when it returns.      The information in this document, created by the medical scribe for me, accurately reflects the services I personally performed and the decisions made by me. I have reviewed and approved this document for accuracy prior to leaving the patient care area.  Arely Khan MD September 17, 2018 11:03 AM  [facility] [place]

## 2018-09-17 NOTE — PATIENT INSTRUCTIONS
"FUTURE APPOINTMENTS  Follow up per pathology report.    WOUND CARE INSTRUCTIONS  1. Wash hands before every dressing change.  2. After 24 hours, change dressing daily.  3. Wash the wound area with a mild soap, then rinse.  4. Gently pat dry with a sterile gauze or Q-tip.  5. Using a Q-tip, apply Vaseline or Aquaphor only over entire wound. Do NOT use Neosporin - as many people react to neomycin.  6. Finally, cover with a bandage or sterile non-stick gauze with micropore paper tape.  7. Repeat once daily until wound has healed.      Soap, water and shampoo will not hurt this area.    Do not go swimming or take baths, but showering is encouraged.    Limit use of the area where the procedure was done for a few days to allow for optimal healing.    If you experience bleeding:  Wash hands and hold firm pressure on the area for 10 minutes without checking to see if the bleeding has stopped. \"Checking\" pulls off the protective wound clot and restarts the bleeding all over again. Re-apply pressure for 10 minutes if necessary to stop bleeding.  Use additional sterile gauze and tape to maintain pressure once bleeding has stopped.  If bleeding continues, then call back to clinic at [phone].    Signs of Infection:  Infection can occur in any area where skin has been disrupted.  If you notice persistent redness, swelling, colored drainage, increasing pain, fever or other signs of infection, please call us at: [phone] and ask to have me or my colleague paged. We will call you back to discuss.    Pathology Results:  You will be notified, generally via letter or MyChart, in approximately 10 days. If there is anything we need to discuss or further treatment needed, I will call you to discuss it.    PATIENT INFORMATION : WOUNDS  During the healing process you will notice a number of changes. All wounds develop a small halo of redness surrounding the wound.  This means healing is occurring. Severe itching with extensive " redness usually indicates sensitivity to the ointment or bandage tape used to dress the wound.  You should call our office if this develops.      Swelling  and/or discoloration around your surgical site is common, particularly when performed around the eye.    All wounds normally drain.  The larger the wound the more drainage there will be.  After 7-10 days, you will notice the wound beginning to shrink and new skin will begin to grow.  The wound is healed when you can see skin has formed over the entire area.  A healed wound has a healthy, shiny look to the surface and is red to dark pink in color to normalize.  Wounds may take approximately 4-6 weeks to heal.  Larger wounds may take 6-8 weeks. After the wound is healed you may discontinue dressing changes.    You may experience a sensation of tightness as your wound heals. This is normal and will gradually subside.    Your healed wound may be sensitive to temperature changes. This sensitivity improves with time, but if you re having a lot of discomfort, try to avoid temperature extremes.    Patients frequently experience itching after their wound appears to have healed because of the continue healing under the skin.  Plain Vaseline will help relieve the itching.

## 2018-09-17 NOTE — MR AVS SNAPSHOT
"              After Visit Summary   9/17/2018    An Roman    MRN: [id number]           Patient Information     Date Of Birth          1980        Visit Information        Provider Department      9/17/2018 11:00 AM Luci Khan [place] [facility] [place]        Today's Diagnoses     Neoplasm of uncertain behavior of skin    -  1      Care Instructions    FUTURE APPOINTMENTS  Follow up per pathology report.    WOUND CARE INSTRUCTIONS  1. Wash hands before every dressing change.  2. After 24 hours, change dressing daily.  3. Wash the wound area with a mild soap, then rinse.  4. Gently pat dry with a sterile gauze or Q-tip.  5. Using a Q-tip, apply Vaseline or Aquaphor only over entire wound. Do NOT use Neosporin - as many people react to neomycin.  6. Finally, cover with a bandage or sterile non-stick gauze with micropore paper tape.  7. Repeat once daily until wound has healed.      Soap, water and shampoo will not hurt this area.    Do not go swimming or take baths, but showering is encouraged.    Limit use of the area where the procedure was done for a few days to allow for optimal healing.    If you experience bleeding:  Wash hands and hold firm pressure on the area for 10 minutes without checking to see if the bleeding has stopped. \"Checking\" pulls off the protective wound clot and restarts the bleeding all over again. Re-apply pressure for 10 minutes if necessary to stop bleeding.  Use additional sterile gauze and tape to maintain pressure once bleeding has stopped.  If bleeding continues, then call back to clinic at [phone].    Signs of Infection:  Infection can occur in any area where skin has been disrupted.  If you notice persistent redness, swelling, colored drainage, increasing pain, fever or other signs of infection, please call us at: [phone] and ask to have me or my colleague paged. We will call you back to discuss.    Pathology Results:  You will be " notified, generally via letter or MyChart, in approximately 10 days. If there is anything we need to discuss or further treatment needed, I will call you to discuss it.    PATIENT INFORMATION : WOUNDS  During the healing process you will notice a number of changes. All wounds develop a small halo of redness surrounding the wound.  This means healing is occurring. Severe itching with extensive redness usually indicates sensitivity to the ointment or bandage tape used to dress the wound.  You should call our office if this develops.      Swelling  and/or discoloration around your surgical site is common, particularly when performed around the eye.    All wounds normally drain.  The larger the wound the more drainage there will be.  After 7-10 days, you will notice the wound beginning to shrink and new skin will begin to grow.  The wound is healed when you can see skin has formed over the entire area.  A healed wound has a healthy, shiny look to the surface and is red to dark pink in color to normalize.  Wounds may take approximately 4-6 weeks to heal.  Larger wounds may take 6-8 weeks. After the wound is healed you may discontinue dressing changes.    You may experience a sensation of tightness as your wound heals. This is normal and will gradually subside.    Your healed wound may be sensitive to temperature changes. This sensitivity improves with time, but if you re having a lot of discomfort, try to avoid temperature extremes.    Patients frequently experience itching after their wound appears to have healed because of the continue healing under the skin.  Plain Vaseline will help relieve the itching.          Follow-ups after your visit        Who to contact     If you have questions or need follow up information about today's clinic visit or your schedule please contact [facility] [place] directly at [phone].  Normal or non-critical lab and imaging results will be communicated to you by [employer],  letter or phone within 4 business days after the clinic has received the results. If you do not hear from us within 7 days, please contact the clinic through [employer] or phone. If you have a critical or abnormal lab result, we will notify you by phone as soon as possible.  Submit refill requests through [employer] or call your pharmacy and they will forward the refill request to us. Please allow 3 business days for your refill to be completed.          Additional Information About Your Visit        Care EveryWhere ID     This is your Care EveryWhere ID. This could be used by other organizations to access your [place] medical records  [id number]        Your Vitals Were     Pulse Oximetry                   98%            Blood Pressure from Last 3 Encounters:   08/02/18 106/71   12/27/17 104/64   11/05/17 124/80    Weight from Last 3 Encounters:   12/27/17 179 lb (81.2 kg)   10/31/17 197 lb 9.6 oz (89.6 kg)   10/24/17 199 lb 6.4 oz (90.4 kg)              Today, you had the following     No orders found for display       Primary Care Provider Office Phone # Fax #    [facility] [phone]       [address] [facility]  [facility] [address]        Equal Access to Services     MYRANDA MARTINEZ : Hadii aad ku hadasho Soomaali, waaxda luqadaha, qaybta kaalmada adeegyada, waxay maryin hayberhane arambula. So [facility] [phone].    ATENCIÓN: Si habla español, tiene a hsu disposición servicios gratuitos de asistencia lingüística. Llame al [phone].    We comply with applicable federal civil rights laws and [place] laws. We do not discriminate on the basis of race, color, national origin, age, disability, sex, sexual orientation, or gender identity.            Thank you!     Thank you for choosing [facility] [place]  for your care. Our goal is always to provide you with excellent care. Hearing back from our patients is one way we  can continue to improve our services. Please take a few minutes to complete the written survey that you may receive in the mail after your visit with us. Thank you!             Your Updated Medication List - Protect others around you: Learn how to safely use, store and throw away your medicines at www.disposemymeds.org.          This list is accurate as of 9/17/18 11:13 AM.  Always use your most recent med list.                   Brand Name Dispense Instructions for use Diagnosis    prenatal multivitamin plus iron 27-0.8 MG Tabs per tablet      Take 1 tablet by mouth daily        SYMPHONY DOUBLE PUMPING SYSTEM Misc     1 each    1 pump as needed    Lactation failure

## 2018-09-20 LAB — COPATH REPORT: NORMAL

## 2018-11-08 ENCOUNTER — ALLIED HEALTH/NURSE VISIT (OUTPATIENT)
Dept: NURSING | Facility: CLINIC | Age: 38
End: 2018-11-08
Payer: COMMERCIAL

## 2018-11-08 DIAGNOSIS — Z23 NEED FOR PROPHYLACTIC VACCINATION AND INOCULATION AGAINST INFLUENZA: Primary | ICD-10-CM

## 2018-11-08 PROCEDURE — 90686 IIV4 VACC NO PRSV 0.5 ML IM: CPT

## 2018-11-08 PROCEDURE — 99207 ZZC NO CHARGE NURSE ONLY: CPT

## 2018-11-08 PROCEDURE — 90471 IMMUNIZATION ADMIN: CPT

## 2018-11-08 NOTE — PROGRESS NOTES

## 2018-11-08 NOTE — NURSING NOTE
Prior to injection verified patient identity using patient's name and date of birth.  Due to injection administration, patient instructed to remain in clinic for 15 minutes  afterwards, and to report any adverse reaction to me immediately.    FABY Golden MA    VIS for Influenza given on same date of administration.  Staff signature/Title: FABY Golden MA

## 2018-11-08 NOTE — MR AVS SNAPSHOT
"              After Visit Summary   2018    An Roman    MRN: [id number]           Patient Information     Date Of Birth          1980        Visit Information        Provider Department      2018 1:50 PM CARE COORDINATOR [facility]        Today's Diagnoses     Need for prophylactic vaccination and inoculation against influenza    -  1       Follow-ups after your visit        Who to contact     If you have questions or need follow up information about today's clinic visit or your schedule please contact [facility] directly at [phone].  Normal or non-critical lab and imaging results will be communicated to you by LIAhart, letter or phone within 4 business days after the clinic has received the results. If you do not hear from us within 7 days, please contact the clinic through Lendinerot or phone. If you have a critical or abnormal lab result, we will notify you by phone as soon as possible.  Submit refill requests through SalonBookr or call your pharmacy and they will forward the refill request to us. Please allow 3 business days for your refill to be completed.          Additional Information About Your Visit        MyChart Information     SalonBookr lets you send messages to your doctor, view your test results, renew your prescriptions, schedule appointments and more. To sign up, go to [email or web address] . Click on \"Log in\" on the left side of the screen, which will take you to the Welcome page. Then click on \"Sign up Now\" on the right side of the page.     You will be asked to enter the access code listed below, as well as some personal information. Please follow the directions to create your username and password.     Your access code is: [email or web address]  Expires: 2019  3:08 PM     Your access code will  in 90 days. If you need help or a new code, please call your [facility] or [phone].        Care " EveryWhere ID     This is your Care EveryWhere ID. This could be used by other organizations to access your [facility] medical records  [id number]         Blood Pressure from Last 3 Encounters:   09/17/18 110/70   08/02/18 106/71   12/27/17 104/64    Weight from Last 3 Encounters:   12/27/17 179 lb (81.2 kg)   10/31/17 197 lb 9.6 oz (89.6 kg)   10/24/17 199 lb 6.4 oz (90.4 kg)              We Performed the Following     FLU VACCINE, SPLIT VIRUS, IM (QUADRIVALENT) [62880]- >3 YRS     Vaccine Administration, Initial [36161]        Primary Care Provider Office Phone # Fax #    [facility] [phone]       [place] [address]  [facility] [address]  [facility] [address]        Equal Access to Services     MYRANDA MARTINEZ : Hadii ingris ku hadasho Sokinza, waaxda luqadaha, qaybta kaalmada adeegyada, mell mcmahon hayberhane zhao . So [facility] [phone].    ATENCIÓN: Si habla español, tiene a hsu disposición servicios gratuitos de asistencia lingüística. Kye al [phone].    We comply with applicable federal civil rights laws and [place] laws. We do not discriminate on the basis of race, color, national origin, age, disability, sex, sexual orientation, or gender identity.            Thank you!     Thank you for choosing [facility]  for your care. Our goal is always to provide you with excellent care. Hearing back from our patients is one way we can continue to improve our services. Please take a few minutes to complete the written survey that you may receive in the mail after your visit with us. Thank you!             Your Updated Medication List - Protect others around you: Learn how to safely use, store and throw away your medicines at www.disposemymeds.org.          This list is accurate as of 11/8/18  3:08 PM.  Always use your most recent med list.                   Brand Name Dispense Instructions for use Diagnosis     prenatal multivitamin plus iron 27-0.8 MG Tabs per tablet      Take 1 tablet by mouth daily        SYMPHONY DOUBLE PUMPING SYSTEM Misc     1 each    1 pump as needed    Lactation failure

## 2019-04-24 NOTE — NURSING NOTE
Called and requested refill(s): patient  Medications: One Touch test strips   Amount: 30 day supply  Pharmacy to be sent to: [facility] on [address]  Call back number: [phone]  Okay to leave detailed voice message: yes       Please abstract the following data from this visit with this patient into the appropriate field in Epic:    Pap smear done on this date: 3/3/2015 (approximately), by this group: [facility], results were Negative for Intraepithelial Lesion or Malignancy.

## 2019-05-03 ENCOUNTER — OFFICE VISIT (OUTPATIENT)
Dept: OBGYN | Facility: CLINIC | Age: 39
End: 2019-05-03
Payer: COMMERCIAL

## 2019-05-03 VITALS — BODY MASS INDEX: 24.17 KG/M2 | SYSTOLIC BLOOD PRESSURE: 106 MMHG | WEIGHT: 152 LBS | DIASTOLIC BLOOD PRESSURE: 64 MMHG

## 2019-05-03 DIAGNOSIS — Z32.00 UNCONFIRMED PREGNANCY: Primary | ICD-10-CM

## 2019-05-03 DIAGNOSIS — Z3A.01 LESS THAN 8 WEEKS GESTATION OF PREGNANCY: Primary | ICD-10-CM

## 2019-05-03 LAB
B-HCG SERPL-ACNC: 35 IU/L (ref 0–5)
HCG UR QL: NEGATIVE

## 2019-05-03 PROCEDURE — 99212 OFFICE O/P EST SF 10 MIN: CPT | Performed by: NURSE PRACTITIONER

## 2019-05-03 PROCEDURE — 81025 URINE PREGNANCY TEST: CPT | Performed by: NURSE PRACTITIONER

## 2019-05-03 PROCEDURE — 36415 COLL VENOUS BLD VENIPUNCTURE: CPT | Performed by: NURSE PRACTITIONER

## 2019-05-03 PROCEDURE — 84702 CHORIONIC GONADOTROPIN TEST: CPT | Performed by: NURSE PRACTITIONER

## 2019-05-03 NOTE — PROGRESS NOTES
SUBJECTIVE:                                                   An Roman is a 38 year old female who presents to clinic today for the following health issue(s):  Patient presents with:  Pregnancy Test      HPI: patient here for pregnancy test.      Patient's last menstrual period was 2019 (exact date)..   Patient is sexually active, .  Using none for contraception.    reports that she quit smoking about 2 years ago. She quit after 10.00 years of use. She has quit using smokeless tobacco.    STD testing offered?  Declined    Health maintenance updated:  yes    Today's PHQ-2 Score:   PHQ-2 (  [employer]) 5/3/2019   Q1: Little interest or pleasure in doing things 0   Q2: Feeling down, depressed or hopeless 0   PHQ-2 Score 0     Today's PHQ-9 Score:   PHQ-9 SCORE 2017   PHQ-9 Total Score 0     Today's ZACKERY-7 Score:   ZACKERY-7 SCORE 2017   Total Score 0       Problem list and histories reviewed & adjusted, as indicated.  Additional history: as documented.    Patient Active Problem List   Diagnosis     Exercise-induced asthma     Dry skin     Eczema     CARDIOVASCULAR SCREENING; LDL GOAL LESS THAN 160     Labor and delivery, indication for care     Indication for care in labor or delivery     Past Surgical History:   Procedure Laterality Date     BREAST SURGERY  [id number]    Breast Augmentatation, saline implants.      SECTION N/A 2017    Procedure:  SECTION;;  Surgeon: Caryn Lozano MD;  Location:  L+D     ENT SURGERY      [place] teeth removed.      Social History     Tobacco Use     Smoking status: Former Smoker     Years: 10.00     Last attempt to quit: 3/5/2017     Years since quittin.1     Smokeless tobacco: Former User     Tobacco comment: 1-2 cigarettes per day; social smoker   Substance Use Topics     Alcohol use: No      Problem (# of Occurrences) Relation (Name,Age of Onset)    Cerebrovascular Disease (3) Paternal Grandmother, Other  (Maternal Grandmother), Maternal Uncle    Colon Cancer (1) Maternal Grandmother    Diabetes (1) Father    Heart Disease (1) Maternal Uncle    Hyperlipidemia (1) Father    Hypertension (1) Mother    Myocardial Infarction (3) Maternal Grandfather, Paternal Grandfather, Maternal Uncle    Other - See Comments (1) Mother: Neck tumor-was removed and was benign.    Ovarian Cancer (1) Mother: uncertain if mother had ovarian cancer or suspicious mass, ovaries removed in early 30s.            Current Outpatient Medications   Medication Sig     Prenatal Vit-Fe Fumarate-FA (PRENATAL MULTIVITAMIN  PLUS IRON) 27-0.8 MG TABS per tablet Take 1 tablet by mouth daily     No current facility-administered medications for this visit.      Allergies   Allergen Reactions     Nkda [No Known Drug Allergies]        ROS:  12 point review of systems negative other than symptoms noted below.    OBJECTIVE:     /64   Wt 68.9 kg (152 lb)   LMP 04/01/2019 (Exact Date)   Breastfeeding? No   BMI 24.17 kg/m    Body mass index is 24.17 kg/m .    Exam:  Constitutional:  Appearance: Well nourished, well developed alert, in no acute distress  Patient's LMP was 04/05/2019, so due right now, but states she has some pregnancy symptoms and has run home UPT's and 2 of them had faint positive lines.  Discussed doing a Quant HCG test and patient would like to proceed.    In-Clinic Test Results:  Results for orders placed or performed in visit on 05/03/19 (from the past 24 hour(s))   HCG Qual, Urine ([id number])   Result Value Ref Range    HCG Qual Urine Negative NEG^Negative       ASSESSMENT/PLAN:                                                        ICD-10-CM    1. Unconfirmed pregnancy Z32.00 HCG Qual, Urine ([id number])     HCG Quantitative Pregnancy, Blood ([id number])       There are no Patient Instructions on file for this visit.    Will notify patient with results when available.  If negative will wait a week and see if cycle comes if not repeat HCG in a  week.      YUNIEL Merino [facility] FOR [facility]

## 2019-05-06 DIAGNOSIS — Z3A.01 LESS THAN 8 WEEKS GESTATION OF PREGNANCY: ICD-10-CM

## 2019-05-06 PROCEDURE — 36415 COLL VENOUS BLD VENIPUNCTURE: CPT | Performed by: NURSE PRACTITIONER

## 2019-05-06 PROCEDURE — 84702 CHORIONIC GONADOTROPIN TEST: CPT | Performed by: NURSE PRACTITIONER

## 2019-05-07 LAB — B-HCG SERPL-ACNC: 72 IU/L (ref 0–5)

## 2019-05-07 NOTE — RESULT ENCOUNTER NOTE
Called pt with results.  Patient needs to have US at 6-7 weeks around week of May 20th.  Dr. Lozano in agreement with this. Danielle Antoine, RNC

## 2019-05-12 ENCOUNTER — NURSE TRIAGE (OUTPATIENT)
Dept: NURSING | Facility: CLINIC | Age: 39
End: 2019-05-12

## 2019-05-12 ENCOUNTER — COMMUNICATION - HEALTHEAST (OUTPATIENT)
Dept: SCHEDULING | Facility: CLINIC | Age: 39
End: 2019-05-12

## 2019-05-13 ENCOUNTER — TELEPHONE (OUTPATIENT)
Dept: OBGYN | Facility: CLINIC | Age: 39
End: 2019-05-13

## 2019-05-13 NOTE — TELEPHONE ENCOUNTER
LMP 4/1/19, 6w0d. Pt has been having some spotting since Saturday. Started out as burgundy blood but is now bright red. Has to wear a pad. Some spots on pad. Not sure if she is having cramping. Does have upset stomach from eating theater food on Sunday. Feels like her perineum is swollen. Denies UTI symptoms. Advised to monitor for now. Call back if has bright red bleeding and or cramping. Has appt for US 5/22/19 for viability US. Will call if has questions or bleeding increases.

## 2019-05-13 NOTE — TELEPHONE ENCOUNTER
Caller is  5 weeks pregnant and has mild vaginal bleeding without cramping today   Triage protocol reviewed;no other risk fa tors   advised to be seen in clinic; call in morning   Advised in home care and watchful waiting   Advised to call back for any new or worsening symptoms as reviewewd   understands and will comply   Ese Tenorio RN  FNA    Reason for Disposition    MILD vaginal bleeding (i.e., less than 1 pad / hour; less than patient's usual menstrual bleeding; not just spotting)    Additional Information    Negative: Shock suspected (e.g., cold/pale/clammy skin, too weak to stand, low BP, rapid pulse)    Negative: Difficult to awaken or acting confused (e.g., disoriented, slurred speech)    Negative: Passed out (i.e., fainted, collapsed and was not responding)    Negative: Sounds like a life-threatening emergency to the triager    Negative: Vaginal bleeding and pregnant > 20 weeks    Negative: Not pregnant or pregnancy status unknown    Negative: SEVERE abdominal pain (e.g., excruciating)    Negative: SEVERE vaginal bleeding (i.e., soaking 2 pads / hour, large blood clots) and present for 2 or more hours    Negative: SEVERE dizziness (e.g., unable to stand, requires support to walk, feels like passing out)    Negative: MODERATE vaginal bleeding (i.e., soaking 1 pad) and present > 6 hours    Negative: MODERATE vaginal bleeding (i.e., soaking 1 pad / hour, clots) and pregnant > 12 weeks    Negative: Passed tissue (e.g., gray-white)    Negative: Shoulder pain    Negative: Constant abdominal pain lasting > 1 hour    Negative: Fever > 100.4 F (38.0 C)    Negative: Pale skin (pallor) of new onset or worsening    Negative: Patient sounds very sick or weak to the triager    Negative: Shock suspected (e.g., cold/pale/clammy skin, too weak to stand, low BP, rapid pulse)    Negative: Difficult to awaken or acting confused (e.g., disoriented, slurred speech)    Negative: Passed out (i.e., lost consciousness,  "collapsed and was not responding)    Negative: Sounds like a life-threatening emergency to the triager    Negative: [1] Vaginal bleeding AND [2] pregnant > 20 weeks    Negative: Not pregnant or pregnancy status unknown    Negative: SEVERE abdominal pain    Negative: [1] SEVERE vaginal bleeding (i.e., soaking 2 pads / hour, large blood clots) AND [2] present 2 or more hours    Negative: SEVERE dizziness (e.g., unable to stand, requires support to walk, feels like passing out)    Negative: [1] MODERATE vaginal bleeding (i.e., soaking 1 pad / hour; clots) AND [2] present > 6 hours    Negative: [1] MODERATE vaginal bleeding (i.e., soaking 1 pad / hour; clots) AND [2] pregnant > 12 weeks    Negative: Passed tissue (e.g., gray-white)    Negative: Shoulder pain    Negative: Pale skin (pallor) of new onset or worsening    Negative: Patient sounds very sick or weak to the triager    Negative: [1] Constant abdominal pain AND [2] present > 2 hours    Negative: Fever > 100.4 F (38.0 C)    Negative: [1] Intermittent lower abdominal pain (e.g., cramping) AND [2] present > 24 hours    Negative: Prior history of \"ectopic pregnancy\" or previous tubal surgery (e.g., tubal ligation)    Negative: Pain or burning with urination    Negative: MODERATE vaginal bleeding (i.e., soaking 1 pad / hour; clots)    Negative: Has IUD    Protocols used: PREGNANCY - VAGINAL BLEEDING LESS THAN 20 WEEKS EGA-A-AH, PREGNANCY - VAGINAL BLEEDING LESS THAN 20 WEEKS EGA-A-OH      "

## 2019-05-14 ENCOUNTER — TELEPHONE (OUTPATIENT)
Dept: OBGYN | Facility: CLINIC | Age: 39
End: 2019-05-14

## 2019-05-14 DIAGNOSIS — O36.80X0 PREGNANCY WITH INCONCLUSIVE FETAL VIABILITY: Primary | ICD-10-CM

## 2019-05-14 NOTE — PROGRESS NOTES
SUBJECTIVE:                                                   An Roman is a 38 year old who presents to clinic today for the following health issue(s):  Patient presents with:  Ultrasound: follow up viability      HPI:  Here for US due to bleeding since mothers day. An reports bleeding has not been quite as heavy as a period but much more than spotting. Per LMP 6w4d, on US today no evidence of pregnancy. Last beta hcg quant on  was 75. Here with . Has 1.5 year old at home. Blood type is O negative.     Patient's last menstrual period was 2019.  Menstrual History: regular  Patient is sexually active  .  Using none for contraception.   Health maintenance updated:  yes  STI infx testing offered:  Declined    Problem list and histories reviewed & adjusted, as indicated.  Additional history: as documented.    Patient Active Problem List   Diagnosis     Exercise-induced asthma     Dry skin     Eczema     Past Surgical History:   Procedure Laterality Date     BREAST SURGERY  [id number]    Breast Augmentatation, saline implants.      SECTION N/A 2017    Procedure:  SECTION;;  Surgeon: Caryn Lozano MD;  Location:  L+D     ENT SURGERY      [place] teeth removed.      Social History     Tobacco Use     Smoking status: Former Smoker     Years: 10.00     Last attempt to quit: 3/5/2017     Years since quittin.2     Smokeless tobacco: Former User     Tobacco comment: 1-2 cigarettes per day; social smoker   Substance Use Topics     Alcohol use: No      Problem (# of Occurrences) Relation (Name,Age of Onset)    Cerebrovascular Disease (3) Paternal Grandmother, Other (Maternal Grandmother), Maternal Uncle    Colon Cancer (1) Maternal Grandmother    Diabetes (1) Father    Heart Disease (1) Maternal Uncle    Hyperlipidemia (1) Father    Hypertension (1) Mother    Myocardial Infarction (3) Maternal Grandfather, Paternal Grandfather, Maternal Uncle    Other -  See Comments (1) Mother: Neck tumor-was removed and was benign.    Ovarian Cancer (1) Mother: uncertain if mother had ovarian cancer or suspicious mass, ovaries removed in early 30s.            Current Outpatient Medications   Medication Sig     Prenatal Vit-Fe Fumarate-FA (PRENATAL MULTIVITAMIN  PLUS IRON) 27-0.8 MG TABS per tablet Take 1 tablet by mouth daily     No current facility-administered medications for this visit.      Allergies   Allergen Reactions     Nkda [No Known Drug Allergies]        ROS:  12 point review of systems negative other than symptoms noted below.  Genitourinary: vaginal bleeding    OBJECTIVE:     /66   Wt 67 kg (147 lb 12.8 oz)   LMP 2019   BMI 23.50 kg/m    Body mass index is 23.5 kg/m .    PHYSICAL EXAM:  Constitutional:  Appearance: Well nourished, well developed alert, in no acute distress     In-Clinic Test Results:  No results found for this or any previous visit (from the past 24 hour(s)).    ASSESSMENT/PLAN:                                                        ICD-10-CM    1. Threatened  in first trimester O20.0 HCG quantitative pregnancy   2. Need for rhogam due to Rh negative mother Z29.13 RH IMMUNE GLOBULIN     INJECTION INTRAMUSCULAR OR SUB-Q       COUNSELING:  Rhogam today in lab  Beta hcg quant ordered  Discussed with patient if beta hcg goes down can diagnose miscarriage, if it goes up will likely need to follow with serial beta  Will call patient with final US results as well  Reviewed timing of next pregnancy after 1 normal period    15 minutes was spent face to face with the patient today discussing her history, diagnosis, and follow-up plan as noted above. Over 50% of the visit was spent in counseling and coordination of care.    Total Visit Time: 15 minutes.       YUNIEL Rivera, JOHNSON

## 2019-05-14 NOTE — TELEPHONE ENCOUNTER
"LMP 4/1/19  - 6w1d  Second pregnancy  New OB scheduled 5/22/19 w Dr Lozano    Still experiencing bleeding since Sunday - 5/12/19  Passing few clots  Amount passed - \"changed about 1 pad a day\"  Denies pain/cramping  Felt a little nausea in the morning and breast tenderness continues  +UPT this morning yet  Patient is O Negative    Requesting to move up her US to Monday, 5/20/19  Was able to move apt up to 5/17/19 followed by the midwives.    Reviewed bleeding/SAB precautions and when to seek ER care.    Pt verbalized understanding, in agreement with plan, and voiced no further questions.      "

## 2019-05-17 ENCOUNTER — OFFICE VISIT (OUTPATIENT)
Dept: MIDWIFE SERVICES | Facility: CLINIC | Age: 39
End: 2019-05-17
Payer: COMMERCIAL

## 2019-05-17 ENCOUNTER — ANCILLARY PROCEDURE (OUTPATIENT)
Dept: ULTRASOUND IMAGING | Facility: CLINIC | Age: 39
End: 2019-05-17
Payer: COMMERCIAL

## 2019-05-17 VITALS — WEIGHT: 147.8 LBS | SYSTOLIC BLOOD PRESSURE: 104 MMHG | DIASTOLIC BLOOD PRESSURE: 66 MMHG | BODY MASS INDEX: 23.5 KG/M2

## 2019-05-17 DIAGNOSIS — O20.0 THREATENED ABORTION IN FIRST TRIMESTER: Primary | ICD-10-CM

## 2019-05-17 DIAGNOSIS — O36.80X0 PREGNANCY WITH INCONCLUSIVE FETAL VIABILITY: ICD-10-CM

## 2019-05-17 DIAGNOSIS — Z29.13 NEED FOR RHOGAM DUE TO RH NEGATIVE MOTHER: ICD-10-CM

## 2019-05-17 LAB — B-HCG SERPL-ACNC: 3 IU/L (ref 0–5)

## 2019-05-17 PROCEDURE — 84702 CHORIONIC GONADOTROPIN TEST: CPT | Performed by: ADVANCED PRACTICE MIDWIFE

## 2019-05-17 PROCEDURE — 76817 TRANSVAGINAL US OBSTETRIC: CPT | Performed by: OBSTETRICS & GYNECOLOGY

## 2019-05-17 PROCEDURE — 96372 THER/PROPH/DIAG INJ SC/IM: CPT | Performed by: ADVANCED PRACTICE MIDWIFE

## 2019-05-17 PROCEDURE — 36415 COLL VENOUS BLD VENIPUNCTURE: CPT | Performed by: ADVANCED PRACTICE MIDWIFE

## 2019-05-17 PROCEDURE — 99213 OFFICE O/P EST LOW 20 MIN: CPT | Mod: 25 | Performed by: ADVANCED PRACTICE MIDWIFE

## 2019-05-18 ENCOUNTER — TELEPHONE (OUTPATIENT)
Dept: MIDWIFE SERVICES | Facility: CLINIC | Age: 39
End: 2019-05-18

## 2019-05-18 ENCOUNTER — NURSE TRIAGE (OUTPATIENT)
Dept: NURSING | Facility: CLINIC | Age: 39
End: 2019-05-18

## 2019-05-18 NOTE — TELEPHONE ENCOUNTER
An went to [facility] yesterday and had an ultrasound and blood test and today is calling for lab results.  FNA relayed results.

## 2019-05-18 NOTE — TELEPHONE ENCOUNTER
Called An to inform her of beta hcg results, she already called nurse line. We discussed she use a back up method until after her next period and then she can actively start trying after her next normal period. She also inquired about the results of her US regarding ovarian cyst, they are still pending in her chart. Dr. Lozano will likely contact her with those results when she views the images.    YUNIEL Rivera, VALDEZM

## 2019-05-20 ENCOUNTER — TELEPHONE (OUTPATIENT)
Dept: OBGYN | Facility: CLINIC | Age: 39
End: 2019-05-20

## 2019-05-20 DIAGNOSIS — N83.202 LEFT OVARIAN CYST: Primary | ICD-10-CM

## 2019-05-20 NOTE — TELEPHONE ENCOUNTER
Scheduled pt for US/visit 7/3.  Need order placed for US.  Patient has questions regarding miscarriage and whether she should continue trying to conceive, or wait until after her appt.

## 2019-05-21 NOTE — TELEPHONE ENCOUNTER
Returned  Cyst on US  Miscarriage confirmed on Saturday and spoke with CORONA Garcia CNM  Asking if she should wait to get pregnant until after US in July (scheduled 7/3/19)    Did not read her Touchtalentt message from Dr corral:  Written by Caryn Corral MD on 5/19/2019  2:21 PM   Nitish Nolan   Left you voice mail today   Ultrasound shows that you miscarried early.   You have a small cyst on left ovary that I would like to recheck in several weeks before you try to get pregnant again.  Please make pelvic ultrasound sound appt with me in first couple weeks of July so we can see if it is gone. The cyst doesn't look like anything serious.   Caryn Corral MD     Read pt above recommendations from Dr Corral.  Pt verbalized understanding, in agreement with plan, and voiced no further questions.    Order for US placed and linked to future apt

## 2019-06-27 NOTE — PROGRESS NOTES
SUBJECTIVE:                                                   An Roman is a 38 year old female who presents to clinic today for the following health issue(s):  Patient presents with:  Ultrasound: L eft ovarian cyst           HPI:  F/u ultrasound today due to possible ovarian cyst seen when she had miscarriage  No pain  Got first period back again  Plans to try again    Patient's last menstrual period was 2019..   Patient is sexually active, .  Using none for contraception.    reports that she quit smoking about 2 years ago. She quit after 10.00 years of use. She has quit using smokeless tobacco.    STD testing offered?  Declined    Health maintenance updated:  yes    Today's PHQ-2 Score:   PHQ-2 (  [employer]) 2019   Q1: Little interest or pleasure in doing things 0   Q2: Feeling down, depressed or hopeless 0   PHQ-2 Score 0     Today's PHQ-9 Score:   PHQ-9 SCORE 2017   PHQ-9 Total Score 0     Today's ZACKERY-7 Score:   ZACKERY-7 SCORE 2017   Total Score 0       Problem list and histories reviewed & adjusted, as indicated.  Additional history: as documented.    Patient Active Problem List   Diagnosis     Exercise-induced asthma     Dry skin     Eczema     Past Surgical History:   Procedure Laterality Date     BREAST SURGERY  [id number]    Breast Augmentatation, saline implants.      SECTION N/A 2017    Procedure:  SECTION;;  Surgeon: Caryn Lozano MD;  Location:  L+D     ENT SURGERY      [place] teeth removed.      Social History     Tobacco Use     Smoking status: Former Smoker     Years: 10.00     Last attempt to quit: 3/5/2017     Years since quittin.3     Smokeless tobacco: Former User     Tobacco comment: 1-2 cigarettes per day; social smoker   Substance Use Topics     Alcohol use: No      Problem (# of Occurrences) Relation (Name,Age of Onset)    Cerebrovascular Disease (3) Paternal Grandmother, Other (Maternal Grandmother), Maternal  "Uncle    Colon Cancer (1) Maternal Grandmother    Diabetes (1) Father    Heart Disease (1) Maternal Uncle    Hyperlipidemia (1) Father    Hypertension (1) Mother    Myocardial Infarction (3) Maternal Grandfather, Paternal Grandfather, Maternal Uncle    Other - See Comments (1) Mother: Neck tumor-was removed and was benign.    Ovarian Cancer (1) Mother: uncertain if mother had ovarian cancer or suspicious mass, ovaries removed in early 30s.            Current Outpatient Medications   Medication Sig     Prenatal Vit-Fe Fumarate-FA (PRENATAL MULTIVITAMIN  PLUS IRON) 27-0.8 MG TABS per tablet Take 1 tablet by mouth daily     No current facility-administered medications for this visit.      Allergies   Allergen Reactions     Nkda [No Known Drug Allergies]        ROS:  12 point review of systems negative other than symptoms noted below.    OBJECTIVE:     /64   Pulse 60   Ht 1.676 m (5' 6\")   Wt 65.3 kg (144 lb)   LMP 06/17/2019   BMI 23.24 kg/m    Body mass index is 23.24 kg/m .    Exam:  Constitutional:  Appearance: Well nourished, well developed alert, in no acute distress  Skin:General Inspection:  No rashes present, no lesions present, no areas of discoloration; Genitalia and Groin:  No rashes present, no lesions present, no areas of discoloration, no masses present.  Neurologic/Psychiatric:  Mental Status:  Oriented X3      In-Clinic Test Results:  Results for orders placed or performed in visit on 07/03/19 (from the past 24 hour(s))   US Transvaginal Non OB    Narrative    US Transvaginal Non OB   Order #: [id number] Accession #: [id number]   Study Notes      Carmen Kolb on 7/3/2019  9:25 AM   Gynecological Ultrasound Report  Pelvic U/S - Transvaginal    [facility]  Referring Provider: Dr. Caryn Lozano  Sonographer: Carmen Kolb [facility]  Indication: F/U left ovarian cyst  LMP (mm/dd/yyyy): 06/17/19  History:   Gynecological Ultrasonography:   Uterus: anteverted  Size: 8.38 x 4.92 x " 4.75cm.    Findings: Normal   Endometrium: Thickness total 11.79mm  Findings: Normal  Right Ovary: 2.56 x 1.67 x 1.93cm.    Findings: Right paraovarian cyst= 4.2x 3.6x 2.9cm  Left Ovary: 2.56 x 2.13 x 1.74cm.   Findings: Left collapsed hemorrhagic cyst= 1.3x 1x 1.4cm  Cul de Sac/Pouch of Mir: No FF      Impression: Right paraovarian cyst 4.2 cm   normal ovaries, uterus  Caryn Lozano MD    _________________   _________________________________________________________________                  ASSESSMENT/PLAN:                                                        ICD-10-CM    1. Cyst of ovary, unspecified laterality N83.209        Patient has right paraovarian cyst without pain  4.2 cm  No reason to intervene  Discussed keeping calender when they try again  Plan us at 6 1/2 wks    Face toface 15 minute, greater than 50% counceling    Caryn Lozano MD  [facility] [place]

## 2019-07-03 ENCOUNTER — OFFICE VISIT (OUTPATIENT)
Dept: OBGYN | Facility: CLINIC | Age: 39
End: 2019-07-03
Payer: COMMERCIAL

## 2019-07-03 ENCOUNTER — ANCILLARY PROCEDURE (OUTPATIENT)
Dept: ULTRASOUND IMAGING | Facility: CLINIC | Age: 39
End: 2019-07-03
Payer: COMMERCIAL

## 2019-07-03 VITALS
SYSTOLIC BLOOD PRESSURE: 114 MMHG | HEIGHT: 66 IN | DIASTOLIC BLOOD PRESSURE: 64 MMHG | WEIGHT: 144 LBS | BODY MASS INDEX: 23.14 KG/M2 | HEART RATE: 60 BPM

## 2019-07-03 DIAGNOSIS — N83.202 LEFT OVARIAN CYST: ICD-10-CM

## 2019-07-03 DIAGNOSIS — N83.209 CYST OF OVARY, UNSPECIFIED LATERALITY: Primary | ICD-10-CM

## 2019-07-03 PROCEDURE — 99213 OFFICE O/P EST LOW 20 MIN: CPT | Performed by: OBSTETRICS & GYNECOLOGY

## 2019-07-03 PROCEDURE — 76830 TRANSVAGINAL US NON-OB: CPT | Performed by: OBSTETRICS & GYNECOLOGY

## 2019-07-03 ASSESSMENT — MIFFLIN-ST. JEOR: SCORE: 1344.93

## 2019-10-01 ENCOUNTER — HEALTH MAINTENANCE LETTER (OUTPATIENT)
Age: 39
End: 2019-10-01

## 2020-04-30 NOTE — PROGRESS NOTES
Individual Psychotherapy (PhD/LCSW)    4/30/2020    Site:  [facility]         Therapeutic Intervention: Met with patient.  Outpatient -  Non physician telephone assessment 30 min - CPT code 05769    Location:  In his home    Chief complaint/reason for encounter: depression     Interval history and content of current session:  Patient presents via phone to ongoing individual therapy due to depression.  He has tried to set up video visits, but he has not had success.  He will try to call the technical support line again.  He was having some abdominal pain.  He thought it was a gall stone.  He called the EMS.  He has a kidney stone.  He is taking a medication to help dissolve a kidney stone.  While in the hospital, he was tested for COVID 19 and the results came back positive.  He is not sure where he contracted the virus.  He did have a co worker, who tested positive.  He is now out of work for a month.  He has filed for unemployment.  He admits that he has had some periods of crying.  Encouraged the patient to find some results oriented activities.  Praise steps to connect with his friends and his children.  Emphasize the positives that are present despite difficult times.  He was able to find his keys.  They were under the counter.  He has been talking to his children on a daily basis.  He is not able to Face Time his daughter.  He was able to get unemployment.  He will be receiving more money than he was making because it is based off of his previous earnings.  He does enjoy caring for his cats.  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  He is happy to not have to drive as often.  He is paying a good bit in car insurance, but the cost will decrease in the future if he is accident free.    Treatment plan:  ? Target symptoms: depression  ? Why chosen therapy is appropriate versus another modality: relevant to diagnosis  ? Outcome monitoring methods: self-report,  [facility], [place] - PRIMARY CARE SKIN    CC : moles and skin exam  SUBJECTIVE:                                                    An Roman is a 37 year old female who presents to clinic today . She has some raised lesions that are irritated by the clothing that she would like removed.  Skin tags on the neck that are irritated.    Personal Medical History  Skin Cancer : NO  Eczema Psoriasis Rosacea Autoimmune   NO NO NO NO   .    Family Medical History  Skin Cancer : ?   Eczema Psoriasis Rosacea Autoimmune   YES - mother YES - mother NO NO   Other :   .    Sun Exposure History  Sunscreen Use :  yes  Occupation : indoor/ outdoor    Refer to electronic medical record (EMR) for past medical history and medications.      ROS : 14 point review of systems was negative except the symptoms listed above in the HPI.        OBJECTIVE:                                                    GENERAL: healthy, alert and no distress  SKIN: Alan Skin Type - II.  Scalp, Face, Neck, Trunk, Arms, Legs, Hands, Feet, Fingers, Toes and Buttocks were examined. The dermatoscope was used to help evaluate pigmented lesions.  Skin Pertinent Findings:      Left lateral face - 5 mm smooth , flesh colored lesion  T 4 - 1 cm to the right- 3 mm raised, brown lesion  T 6- 5 cm to the left - 5 mm raised brown lesion  Left medial knee- 6 mm flesh colored lesion                           Back, arms, legs- scattered benign brown macules of various sizes and shapes most consistent with (benign) melanocytic nevi                         Neck, left thigh- 1-2 mm in size, brown, pedunculated, benign-appearing skin tag(s)                                  Name : Liquid Nitrogen Cry-Ac Cryotherapy.  Indication : inflamed skin tag    Completed by : Arely Khan MD  Note : Discussed natural history of lesion and treatment options. Prior to treatment, we discussed inflammation, tenderness post-procedure, the healing process, and the risks of pain,  infection, scarring, blistering, and hypo-/hyperpigmentation after healing. Explained that these lesions may grow back and may need additional treatment or re-treatment. The patient expressed a desire to proceed with cryotherapy.    The lesion(s) was treated with liquid nitrogen Cry-Ac, five second freeze repeated twice with a pause to allow for the area to thaw. If this lesion should recur, then it needs to be re-evaluated.    Patient tolerated the procedure well and left in good condition.  Total number of lesions treated : 5.            ASSESSMENT:                                                      Encounter Diagnoses   Name Primary?     Neoplasm of uncertain behavior of skin Yes     Melanocytic nevi of trunk      Inflamed skin tag            PLAN:                                                    Patient Instructions   Schedule for appointment for shave removal if the 4 lesions    CRYOTHERAPY POST-TREATMENT CARE INSTRUCTIONS  Liquid nitrogen is mildly uncomfortable when applied to the skin, but the discomfort rapidly subsides.    Post-Treatment:  You may experience burning and/or stinging immediately following the procedure. The discomfort from the procedure may persist over the next 12-24 hours. The area treated will look pinker and slightly swollen before the healing process begins. You may also notice redness, swelling, tenderness, weeping and crusts or scabs. Healing time is approximately 10-14 days.    Blister - You may or may notice blistering from the freezing. If you develop an uncomfortable blister from today's treatment, you may gently puncture this with a needle that has been cleaned with alcohol. However, do not remove the protective skin layer of the blister.    Scab - After a few days, you may notice scaliness or scab formation. Do not pick at the scabs because this may cause slower healing and a permanent scar.    The skin may appear temporarily darker at the treatment site, but this usually  observation  ? Therapeutic intervention type: insight oriented psychotherapy, supportive psychotherapy, interactive psychotherapy     Risk parameters:  Patient reports no suicidal ideation  Patient reports no homicidal ideation  Patient reports no self-injurious behavior  Patient reports no violent behavior     Verbal deficits: None     Patient's response to intervention:  The patient's response to intervention is accepting, motivated.     Progress toward goals and other mental status changes:  The patient's progress toward goals is fair.     Diagnosis:   Major depression recurrent moderate     Plan:  individual psychotherapy and medication management by physician, Dr. Campbell     Return to clinic: as scheduled     Length of Service (minutes): 30   fades over a period of months, provided that the area is protected from the sun.    Care of the areas treated:    Wash the area with a mild cleanser.    Gently pat dry.    Do not rub.     Keep protected from the sun during the healing process and for a full year following treatment as the skin continues to remodel during this time.    If you experience dryness or persistent burning, you may use Vaseline or Aquaphor ointment sparingly.    Do not use Neosporin, as many people eventually develop a medication allergy, that can easily be confused with an infection, to Neomycin.    Return if:  If there is any concern that the lesion has persisted, make an appointment for a re-check. Healing time does vary depending on your individual healing process and the area of the body treated. Most patients will be healed in one month.    Signs of Infection:  Thankfully this is rare. However if you notice persistent colored drainage, increasing pain, fever or other signs of infection, please call back at [phone].      Educational brochures given to patient : .      PROCEDURES:                                                    None.    TT : 25 minutes.  CT : 20 minutes.      The information in this document, created by the medical scribe for me, accurately reflects the services I personally performed and the decisions made by me. I have reviewed and approved this document for accuracy prior to leaving the patient care area.  Arely Khan MD August 2, 2018 3:17 PM  [facility] [place]

## 2020-06-23 DIAGNOSIS — O36.80X0 PREGNANCY WITH INCONCLUSIVE FETAL VIABILITY: Primary | ICD-10-CM

## 2020-06-23 NOTE — PROGRESS NOTES
Pt has NOB scheduled with ultrasound. Needed order to be placed. Future ultrasound order placed and linked with appt. Closing encounter.   Sole Pickett RN on 6/23/2020 at 3:18 PM

## 2020-06-30 ENCOUNTER — PRENATAL OFFICE VISIT (OUTPATIENT)
Dept: NURSING | Facility: CLINIC | Age: 40
End: 2020-06-30
Payer: COMMERCIAL

## 2020-06-30 VITALS — HEIGHT: 66 IN | WEIGHT: 147 LBS | BODY MASS INDEX: 23.63 KG/M2

## 2020-06-30 DIAGNOSIS — O09.91 SUPERVISION OF HIGH RISK PREGNANCY IN FIRST TRIMESTER: ICD-10-CM

## 2020-06-30 DIAGNOSIS — Z13.79 GENETIC SCREENING: Primary | ICD-10-CM

## 2020-06-30 PROBLEM — O09.90 SUPERVISION OF HIGH-RISK PREGNANCY: Status: ACTIVE | Noted: 2020-06-30

## 2020-06-30 PROCEDURE — 99207 ZZC NO CHARGE NURSE ONLY: CPT

## 2020-06-30 ASSESSMENT — MIFFLIN-ST. JEOR: SCORE: 1353.54

## 2020-06-30 NOTE — PROGRESS NOTES
"    SUBJECTIVE:     HPI:    This is a 39 year old female patient,  who presents for her first obstetrical visit.    CASIE: Not found..  She is Unknown weeks.  Her cycles are regular.  Her last menstrual period was normal.   Since her LMP, she has experienced  nausea, emesis and fatigue).   She denies nausea and vaginal bleeding.    Have you travelled during the pregnancy?No  Have your sexual partner(s) travelled during the pregnancy?No      HISTORY:   Planned Pregnancy: Yes  Marital Status: Single  Occupation: [employer]  Living in Household:  Jack Michael    Past History:  Her past medical history   Past Medical History:   Diagnosis Date     Exercise induced bronchospasm      Pyelonephritis     While in [place]     Uncomplicated asthma     Exercise induced asthma, has an emergency inhaler is needed, hasn't used in many years.   .      She has a history of  prior  section    Since her last LMP she denies use of alcohol, tobacco and street drugs.    Past medical, surgical, social and family history were reviewed and updated in [facility].        Current Outpatient Medications   Medication     Prenatal Vit-Fe Fumarate-FA (PRENATAL MULTIVITAMIN  PLUS IRON) 27-0.8 MG TABS per tablet     No current facility-administered medications for this visit.        ROS:   12 point review of systems negative other than symptoms noted below or in the HPI.  Constitutional: Fatigue  Gastrointestinal: Nausea and Vomiting      OBJECTIVE:     EXAM:  Ht 1.676 m (5' 6\")   Wt 66.7 kg (147 lb)   BMI 23.73 kg/m   Body mass index is 23.73 kg/m .      "

## 2020-07-04 ENCOUNTER — NURSE TRIAGE (OUTPATIENT)
Dept: NURSING | Facility: CLINIC | Age: 40
End: 2020-07-04

## 2020-07-05 NOTE — TELEPHONE ENCOUNTER
9 weeks pregnant  - 48 hours ago developed heart palpitations, had a little bit of spotting on Thursday morning. Then had spotting again this morning with a couple of clots. Has some numbness in side of arm/leg - could be because she was in a car for a couple of hours. States it feels like an inconsistent fluttering - when checking her pulse it is a little irregular, but not all the time.    Per protocol - advised ED evaluation - will go to [facility] ED.    COVID 19 Nurse Triage Plan/Patient Instructions    Please be aware that novel coronavirus (COVID-19) may be circulating in the community. If you develop symptoms such as fever, cough, or SOB or if you have concerns about the presence of another infection including coronavirus (COVID-19), please contact your health care provider or visit [email or web address].     Disposition/Instructions    Patient to go to ED and follow protocol based instructions.     Bring Your Own Device:  Please also bring your smart device(s) (smart phones, tablets, laptops) and their charging cables for your personal use and to communicate with your care team during your visit.    Thank you for taking steps to prevent the spread of this virus.  o Limit your contact with others.  o Wear a simple mask to cover your cough.  o Wash your hands well and often.    Resources    [facility]: About COVID-19: [email or web address]    CDC: What to Do If You're Sick: www.cdc.gov/coronavirus/2019-ncov/about/steps-when-sick.html    CDC: Ending Home Isolation: www.cdc.gov/coronavirus/2019-ncov/hcp/disposition-in-home-patients.html     CDC: Caring for Someone: www.cdc.gov/coronavirus/2019-ncov/if-you-are-sick/care-for-someone.html     [facility]: Interim Guidance for Hospital Discharge to Home: www.health.[facility].[place]/diseases/coronavirus/hcp/hospdischarge.pdf    [facility] clinical trials (COVID-19 research studies): clinicalaffairs.[email or web address]/umn-clinical-trials     Below are the COVID-19  hotlines at the [place] Department of Health ([facility]). Interpreters are available.   o For health questions: Call 816-520-9477 or 1-280.168.8226 (7 a.m. to 7 p.m.)  o For questions about schools and childcare: Call 159-388-5304 or 1-246.940.9581 (7 a.m. to 7 p.m.)     Imelda Kulkarni RN on 7/4/2020 at 7:52 PM     Additional Information    Negative: Passed out (i.e., lost consciousness, collapsed and was not responding)    Negative: Shock suspected (e.g., cold/pale/clammy skin, too weak to stand, low BP, rapid pulse)    Negative: Difficult to awaken or acting confused (e.g., disoriented, slurred speech)    Negative: Visible sweat on face or sweat dripping down face    Negative: Unable to walk, or can only walk with assistance (e.g., requires support)    Negative: [1] Received SHOCK from implantable cardiac defibrillator AND [2] persisting symptoms (i.e., palpitations, lightheadedness)    Negative: Sounds like a life-threatening emergency to the triager    Negative: Chest pain    Negative: Difficulty breathing    Negative: Dizziness, lightheadedness, or weakness    Negative: [1] Heart beating very rapidly (e.g., > 140 / minute) AND [2] present now  (Exception: during exercise)    Negative: Heart beating very slowly (e.g., < 50 / minute)  (Exception: athlete)    Negative: New or worsened shortness of breath with activity (dyspnea on exertion)    Negative: Patient sounds very sick or weak to the triager    Negative: [1] Heart beating very rapidly (e.g., > 140 / minute) AND [2] not present now  (Exception: during exercise)    Negative: [1] Skipped or extra beat(s) AND [2] increases with exercise or exertion    [1] Skipped or extra beat(s) AND [2] occurs 4 or more times per minute    Protocols used: HEART RATE AND HEARTBEAT [id number]

## 2020-07-07 ENCOUNTER — OFFICE VISIT (OUTPATIENT)
Dept: OBGYN | Facility: CLINIC | Age: 40
End: 2020-07-07
Payer: COMMERCIAL

## 2020-07-07 VITALS
HEIGHT: 66 IN | SYSTOLIC BLOOD PRESSURE: 90 MMHG | DIASTOLIC BLOOD PRESSURE: 62 MMHG | WEIGHT: 148 LBS | BODY MASS INDEX: 23.78 KG/M2

## 2020-07-07 DIAGNOSIS — O03.9 SPONTANEOUS MISCARRIAGE: Primary | ICD-10-CM

## 2020-07-07 PROCEDURE — 99214 OFFICE O/P EST MOD 30 MIN: CPT | Performed by: OBSTETRICS & GYNECOLOGY

## 2020-07-07 ASSESSMENT — MIFFLIN-ST. JEOR: SCORE: 1358.07

## 2020-07-07 NOTE — PROGRESS NOTES
SUBJECTIVE:                                                   An Roman is a 39 year old female who presents to clinic today for the following health issue(s):  Patient presents with:  Miscarriage: patient started spotting 4 days ago and then bleeding with clots overnight    Additional information: patient went to ED on  for several issues and had an US and blood work. Patient has an US here later today.    HPI:  Patient had a likely miscarriage last night  Had good dates, lmp 5/1 with + upt 6/3  Had bleeding, was seen in Emergency Room and had nonviable iup 5w6d, no heartbeat and way too small for certain dates  Last night passed tissue and now says bleeding mostly good  Got rhogam in Er already  Wants to try again    Patient's last menstrual period was 2020..   Patient is sexually active, .  Using nothing for contraception, recent pregnancy   reports that she quit smoking about 3 years ago. She quit after 10.00 years of use. She has quit using smokeless tobacco.  Health maintenance updated:  Due for pap    Today's PHQ-2 Score:   PHQ-2 (  [employer]) 2019   Q1: Little interest or pleasure in doing things 0   Q2: Feeling down, depressed or hopeless 0   PHQ-2 Score 0     Today's PHQ-9 Score:   PHQ-9 SCORE 2020   PHQ-9 Total Score MyChart 0   PHQ-9 Total Score 0     Today's ZACKERY-7 Score:   ZACKERY-7 SCORE 2020   Total Score 0 (minimal anxiety)   Total Score 0       Problem list and histories reviewed & adjusted, as indicated.  Additional history: as documented.    Patient Active Problem List   Diagnosis     Exercise-induced asthma     Dry skin     Eczema     Supervision of high-risk pregnancy     Past Surgical History:   Procedure Laterality Date     BREAST SURGERY  [id number]    Breast Augmentatation, saline implants.      SECTION N/A 2017    Procedure:  SECTION;;  Surgeon: Caryn Lozano MD;  Location:  L+D     ENT SURGERY      [place] teeth  "removed.      Social History     Tobacco Use     Smoking status: Former Smoker     Years: 10.00     Last attempt to quit: 3/5/2017     Years since quitting: 3.3     Smokeless tobacco: Former User     Tobacco comment: 1-2 cigarettes per day; social smoker   Substance Use Topics     Alcohol use: No      Problem (# of Occurrences) Relation (Name,Age of Onset)    Cerebrovascular Disease (3) Paternal Grandmother, Other (Maternal Grandmother), Maternal Uncle    Colon Cancer (1) Maternal Grandmother    Diabetes (1) Father    Heart Disease (1) Maternal Uncle    Hyperlipidemia (1) Father    Hypertension (1) Mother    Myocardial Infarction (3) Maternal Grandfather, Paternal Grandfather, Maternal Uncle    Other - See Comments (1) Mother: Neck tumor-was removed and was benign.    Ovarian Cancer (1) Mother: uncertain if mother had ovarian cancer or suspicious mass, ovaries removed in early 30s.            Current Outpatient Medications   Medication Sig     Prenatal Vit-Fe Fumarate-FA (PRENATAL MULTIVITAMIN  PLUS IRON) 27-0.8 MG TABS per tablet Take 1 tablet by mouth daily     No current facility-administered medications for this visit.      Allergies   Allergen Reactions     Nkda [No Known Drug Allergies]        ROS:  12 point review of systems negative other than symptoms noted below or in the HPI.  Genitourinary: Cramps and vaginal bleeding  No urinary frequency or dysuria, bladder or kidney problems      OBJECTIVE:     BP 90/62   Ht 1.676 m (5' 6\")   Wt 67.1 kg (148 lb)   LMP 05/01/2020   BMI 23.89 kg/m    Body mass index is 23.89 kg/m .    Exam:  Constitutional:  Appearance: Well nourished, well developed alert, in no acute distress  Neck:  Lymph Nodes:  No lymphadenopathy present; Thyroid:  Gland size normal, nontender, no nodules or masses present on palpation  Neurologic:  Mental Status:  Oriented X3.  Normal strength and tone, sensory exam grossly normal, mentation intact and speech normal.    Psychiatric:  Mentation " appears normal and affect normal/bright.     In-Clinic Test Results:  No results found for this or any previous visit (from the past 24 hour(s)).    ASSESSMENT/PLAN:                                                        ICD-10-CM    1. Spontaneous miscarriage  O03.9        History consistent with early Sab  Already had rhogam   hcg was only 20 in Emergency Room 2 days ago and now passed tissue  Will cancel f/u us  Try again after next pregnancy  Discussed causes of miscarriages and maternal age    Face to face 25 minute, greater than 50% counseling    Caryn Lozano MD  [facility]

## 2020-07-08 ENCOUNTER — COMMUNICATION - HEALTHEAST (OUTPATIENT)
Dept: SCHEDULING | Facility: CLINIC | Age: 40
End: 2020-07-08

## 2020-07-08 ENCOUNTER — OFFICE VISIT - HEALTHEAST (OUTPATIENT)
Dept: FAMILY MEDICINE | Facility: CLINIC | Age: 40
End: 2020-07-08

## 2020-07-08 DIAGNOSIS — T73.2XXA FATIGUE DUE TO EXPOSURE, INITIAL ENCOUNTER: ICD-10-CM

## 2020-07-08 DIAGNOSIS — R00.2 PALPITATIONS: ICD-10-CM

## 2020-07-09 ENCOUNTER — COMMUNICATION - HEALTHEAST (OUTPATIENT)
Dept: CARDIOLOGY | Facility: CLINIC | Age: 40
End: 2020-07-09

## 2020-07-09 ENCOUNTER — OFFICE VISIT - HEALTHEAST (OUTPATIENT)
Dept: CARDIOLOGY | Facility: CLINIC | Age: 40
End: 2020-07-09

## 2020-07-09 DIAGNOSIS — R20.0 NUMBNESS AND TINGLING OF RIGHT ARM AND LEG: ICD-10-CM

## 2020-07-09 DIAGNOSIS — R00.2 PALPITATIONS: ICD-10-CM

## 2020-07-09 DIAGNOSIS — O20.0 THREATENED ABORTION: ICD-10-CM

## 2020-07-09 DIAGNOSIS — R20.2 NUMBNESS AND TINGLING OF RIGHT ARM AND LEG: ICD-10-CM

## 2020-07-09 ASSESSMENT — MIFFLIN-ST. JEOR: SCORE: 1349.67

## 2020-07-13 ENCOUNTER — NURSE TRIAGE (OUTPATIENT)
Dept: NURSING | Facility: CLINIC | Age: 40
End: 2020-07-13

## 2020-07-13 DIAGNOSIS — O03.9 MISCARRIAGE: ICD-10-CM

## 2020-07-13 DIAGNOSIS — N93.9 UTERINE BLEEDING: Primary | ICD-10-CM

## 2020-07-13 NOTE — TELEPHONE ENCOUNTER
7/7/20 SAB  Normal period bleeding up until yesterday started to bleed more.  A lot bright red blood that exited like she was urinating it out but it was not coming from the bladder.  Pain and cramping every 3 min and heavy bleeding and   Passing large clots - 1 size of golf ball and others that was smaller  Changed a pad in 5 min.  Feeling fine but taking it easy    Consulted Dr Lozano with above and amount of bleeding and feels this is expected miscarriage bleeding. Monitor for today and schedule US for tomorrow. If bleeding slows, then may cancel the US.    Instructed pt on above. Be seen in ER if bleeding continues to be heavy and changing pads as frequently as she has reported over the next few hours, feeling dizzy, lightheaded or SOB. Pt is concerned about the bleeding and she will monitor but knows to be seen in ER for continued heavy bleeding.  Encouraged rest/hydration.    Pt verbalized understanding, in agreement with plan, and voiced no further questions.    Sole Pickett RN on 7/13/2020 at 9:21 AM

## 2020-07-13 NOTE — TELEPHONE ENCOUNTER
"Patient miscarried last week and is experiencing \"extremely heavy bleeding and passing large clots\".  Also constant abdominal cramping.  Has been bleeding since miscarriage but started to become heavier yesterday and now is constant.  Just changed pad 5 minutes ago and will be changing again now.      Additional Information    SEVERE vaginal bleeding (i.e., soaking 2 pads or tampons per hour and present 2 or more hours; 1 menstrual cup every 2 hours)    Negative: SEVERE abdominal pain (e.g., excruciating)    Negative: SEVERE dizziness (e.g., unable to stand, requires support to walk, feels like passing out now)    Negative: Pregnant > 20 weeks (5 months or more)    Negative: Pregnant < 20 weeks (less than 5 months)    Negative: Postpartum (from 0 to 6 weeks after delivery)    Negative: Vaginal discharge is the main symptom and bleeding is slight    Negative: SEVERE vaginal bleeding (e.g., continuous red blood from vagina, or large blood clots) and very weak (can't stand)    Negative: Passed out (i.e., fainted, collapsed and was not responding)    Negative: Difficult to awaken or acting confused (e.g., disoriented, slurred speech)    Negative: Shock suspected (e.g., cold/pale/clammy skin, too weak to stand, low BP, rapid pulse)    Negative: Sounds like a life-threatening emergency to the triager    Protocols used: VAGINAL BLEEDING - [id number]      "

## 2020-07-16 ENCOUNTER — HOSPITAL ENCOUNTER (OUTPATIENT)
Dept: CARDIOLOGY | Facility: CLINIC | Age: 40
Discharge: HOME | End: 2020-07-16
Attending: INTERNAL MEDICINE

## 2020-07-16 DIAGNOSIS — R00.2 PALPITATIONS: ICD-10-CM

## 2020-07-16 LAB
AORTIC ROOT: 2.7 CM
AORTIC VALVE MEAN VELOCITY: 71.2 CM/S
ASCENDING AORTA: 2.4 CM
AV DIMENSIONLESS INDEX VTI: 0.7
AV MEAN GRADIENT: 2 MMHG
AV PEAK GRADIENT: 3.8 MMHG
AV VALVE AREA: 2.3 CM2
AV VELOCITY RATIO: 0.8
BSA FOR ECHO PROCEDURE: 1.77 M2
CV BLOOD PRESSURE: NORMAL MMHG
CV ECHO HEIGHT: 65.5 IN
CV ECHO WEIGHT: 149 LBS
DOP CALC AO PEAK VEL: 97.1 CM/S
DOP CALC AO VTI: 20.5 CM
DOP CALC LVOT AREA: 3.14 CM2
DOP CALC LVOT DIAMETER: 2 CM
DOP CALC LVOT PEAK VEL: 75.9 CM/S
DOP CALC LVOT STROKE VOLUME: 46.8 CM3
DOP CALCLVOT PEAK VEL VTI: 14.9 CM
EJECTION FRACTION: 66 % (ref 55–75)
FRACTIONAL SHORTENING: 30.4 % (ref 28–44)
INTERVENTRICULAR SEPTUM IN END DIASTOLE: 0.8 CM (ref 0.6–0.9)
IVS/PW RATIO: 1
LA AREA 1: 13.9 CM2
LA AREA 2: 17.6 CM2
LEFT ATRIUM LENGTH: 4.89 CM
LEFT ATRIUM SIZE: 3.2 CM
LEFT ATRIUM VOLUME INDEX: 24 ML/M2
LEFT ATRIUM VOLUME: 42.5 ML
LEFT VENTRICLE CARDIAC INDEX: 1.5 L/MIN/M2
LEFT VENTRICLE CARDIAC OUTPUT: 2.7 L/MIN
LEFT VENTRICLE DIASTOLIC VOLUME INDEX: 34.5 CM3/M2 (ref 29–61)
LEFT VENTRICLE DIASTOLIC VOLUME: 61 CM3 (ref 46–106)
LEFT VENTRICLE HEART RATE: 57 BPM
LEFT VENTRICLE MASS INDEX: 66.6 G/M2
LEFT VENTRICLE SYSTOLIC VOLUME INDEX: 11.9 CM3/M2 (ref 8–24)
LEFT VENTRICLE SYSTOLIC VOLUME: 21 CM3 (ref 14–42)
LEFT VENTRICULAR INTERNAL DIMENSION IN DIASTOLE: 4.6 CM (ref 3.8–5.2)
LEFT VENTRICULAR INTERNAL DIMENSION IN SYSTOLE: 3.2 CM (ref 2.2–3.5)
LEFT VENTRICULAR MASS: 117.9 G
LEFT VENTRICULAR OUTFLOW TRACT MEAN GRADIENT: 1 MMHG
LEFT VENTRICULAR OUTFLOW TRACT MEAN VELOCITY: 53.1 CM/S
LEFT VENTRICULAR OUTFLOW TRACT PEAK GRADIENT: 2 MMHG
LEFT VENTRICULAR POSTERIOR WALL IN END DIASTOLE: 0.8 CM (ref 0.6–0.9)
LV STROKE VOLUME INDEX: 26.4 ML/M2
MITRAL VALVE E/A RATIO: 2.6
MV AVERAGE E/E' RATIO: 5.3 CM/S
MV DECELERATION TIME: 225 MS
MV E'TISSUE VEL-LAT: 19.2 CM/S
MV E'TISSUE VEL-MED: 11.9 CM/S
MV LATERAL E/E' RATIO: 4.3
MV MEDIAL E/E' RATIO: 6.9
MV PEAK A VELOCITY: 31.6 CM/S
MV PEAK E VELOCITY: 82.7 CM/S
NUC REST DIASTOLIC VOLUME INDEX: 2384 LBS
NUC REST SYSTOLIC VOLUME INDEX: 65.5 IN
TRICUSPID VALVE ANULAR PLANE SYSTOLIC EXCURSION: 1.7 CM

## 2020-07-16 ASSESSMENT — MIFFLIN-ST. JEOR: SCORE: 1349.67

## 2020-07-17 ENCOUNTER — COMMUNICATION - HEALTHEAST (OUTPATIENT)
Dept: CARDIOLOGY | Facility: CLINIC | Age: 40
End: 2020-07-17

## 2020-08-14 ENCOUNTER — COMMUNICATION - HEALTHEAST (OUTPATIENT)
Dept: CARDIOLOGY | Facility: CLINIC | Age: 40
End: 2020-08-14

## 2020-11-09 ENCOUNTER — ALLIED HEALTH/NURSE VISIT (OUTPATIENT)
Dept: PEDIATRICS | Facility: CLINIC | Age: 40
End: 2020-11-09
Payer: COMMERCIAL

## 2020-11-09 DIAGNOSIS — Z23 NEED FOR PROPHYLACTIC VACCINATION AND INOCULATION AGAINST INFLUENZA: Primary | ICD-10-CM

## 2020-11-09 PROCEDURE — 90686 IIV4 VACC NO PRSV 0.5 ML IM: CPT

## 2020-11-09 PROCEDURE — 90471 IMMUNIZATION ADMIN: CPT

## 2020-11-09 PROCEDURE — 99207 PR NO CHARGE NURSE ONLY: CPT

## 2020-12-01 ENCOUNTER — PRENATAL OFFICE VISIT (OUTPATIENT)
Dept: NURSING | Facility: CLINIC | Age: 40
End: 2020-12-01
Payer: COMMERCIAL

## 2020-12-01 DIAGNOSIS — O09.91 SUPERVISION OF HIGH RISK PREGNANCY IN FIRST TRIMESTER: Primary | ICD-10-CM

## 2020-12-01 DIAGNOSIS — O36.80X0 PREGNANCY WITH INCONCLUSIVE FETAL VIABILITY: Primary | ICD-10-CM

## 2020-12-01 PROBLEM — O09.90 SUPERVISION OF HIGH-RISK PREGNANCY: Status: RESOLVED | Noted: 2020-06-30 | Resolved: 2020-12-01

## 2020-12-01 PROCEDURE — 99207 PR NO CHARGE NURSE ONLY: CPT

## 2020-12-01 ASSESSMENT — ANXIETY QUESTIONNAIRES
IF YOU CHECKED OFF ANY PROBLEMS ON THIS QUESTIONNAIRE, HOW DIFFICULT HAVE THESE PROBLEMS MADE IT FOR YOU TO DO YOUR WORK, TAKE CARE OF THINGS AT HOME, OR GET ALONG WITH OTHER PEOPLE: NOT DIFFICULT AT ALL
2. NOT BEING ABLE TO STOP OR CONTROL WORRYING: NOT AT ALL
5. BEING SO RESTLESS THAT IT IS HARD TO SIT STILL: NOT AT ALL
7. FEELING AFRAID AS IF SOMETHING AWFUL MIGHT HAPPEN: NOT AT ALL
GAD7 TOTAL SCORE: 1
1. FEELING NERVOUS, ANXIOUS, OR ON EDGE: NOT AT ALL
3. WORRYING TOO MUCH ABOUT DIFFERENT THINGS: NOT AT ALL
6. BECOMING EASILY ANNOYED OR IRRITABLE: SEVERAL DAYS

## 2020-12-01 ASSESSMENT — PATIENT HEALTH QUESTIONNAIRE - PHQ9
5. POOR APPETITE OR OVEREATING: NOT AT ALL
SUM OF ALL RESPONSES TO PHQ QUESTIONS 1-9: 3
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
## 2020-12-02 ASSESSMENT — ANXIETY QUESTIONNAIRES: GAD7 TOTAL SCORE: 1

## 2020-12-07 ENCOUNTER — PRENATAL OFFICE VISIT (OUTPATIENT)
Dept: OBGYN | Facility: CLINIC | Age: 40
End: 2020-12-07
Payer: COMMERCIAL

## 2020-12-07 ENCOUNTER — ANCILLARY PROCEDURE (OUTPATIENT)
Dept: ULTRASOUND IMAGING | Facility: CLINIC | Age: 40
End: 2020-12-07
Payer: COMMERCIAL

## 2020-12-07 VITALS
WEIGHT: 152 LBS | DIASTOLIC BLOOD PRESSURE: 68 MMHG | HEIGHT: 65 IN | HEART RATE: 64 BPM | SYSTOLIC BLOOD PRESSURE: 102 MMHG | BODY MASS INDEX: 25.33 KG/M2

## 2020-12-07 DIAGNOSIS — O36.80X0 PREGNANCY WITH INCONCLUSIVE FETAL VIABILITY: ICD-10-CM

## 2020-12-07 DIAGNOSIS — O09.521 AMA (ADVANCED MATERNAL AGE) MULTIGRAVIDA 35+, FIRST TRIMESTER: Primary | ICD-10-CM

## 2020-12-07 DIAGNOSIS — O09.91 SUPERVISION OF HIGH RISK PREGNANCY IN FIRST TRIMESTER: ICD-10-CM

## 2020-12-07 LAB
ALBUMIN UR-MCNC: NEGATIVE MG/DL
APPEARANCE UR: CLEAR
BILIRUB UR QL STRIP: NEGATIVE
COLOR UR AUTO: YELLOW
ERYTHROCYTE [DISTWIDTH] IN BLOOD BY AUTOMATED COUNT: 12.1 % (ref 10–15)
GLUCOSE UR STRIP-MCNC: NEGATIVE MG/DL
HCT VFR BLD AUTO: 36.8 % (ref 35–47)
HGB BLD-MCNC: 12.5 G/DL (ref 11.7–15.7)
HGB UR QL STRIP: NEGATIVE
KETONES UR STRIP-MCNC: NEGATIVE MG/DL
LEUKOCYTE ESTERASE UR QL STRIP: NEGATIVE
MCH RBC QN AUTO: 30.8 PG (ref 26.5–33)
MCHC RBC AUTO-ENTMCNC: 34 G/DL (ref 31.5–36.5)
MCV RBC AUTO: 91 FL (ref 78–100)
NITRATE UR QL: NEGATIVE
PH UR STRIP: 7 PH (ref 5–7)
PLATELET # BLD AUTO: 334 10E9/L (ref 150–450)
RBC # BLD AUTO: 4.06 10E12/L (ref 3.8–5.2)
SOURCE: NORMAL
SP GR UR STRIP: 1.02 (ref 1–1.03)
UROBILINOGEN UR STRIP-ACNC: 0.2 EU/DL (ref 0.2–1)
WBC # BLD AUTO: 9.4 10E9/L (ref 4–11)

## 2020-12-07 PROCEDURE — 81003 URINALYSIS AUTO W/O SCOPE: CPT | Performed by: OBSTETRICS & GYNECOLOGY

## 2020-12-07 PROCEDURE — 86901 BLOOD TYPING SEROLOGIC RH(D): CPT | Performed by: OBSTETRICS & GYNECOLOGY

## 2020-12-07 PROCEDURE — 99000 SPECIMEN HANDLING OFFICE-LAB: CPT | Performed by: OBSTETRICS & GYNECOLOGY

## 2020-12-07 PROCEDURE — 86780 TREPONEMA PALLIDUM: CPT | Mod: 90 | Performed by: OBSTETRICS & GYNECOLOGY

## 2020-12-07 PROCEDURE — 87086 URINE CULTURE/COLONY COUNT: CPT | Performed by: OBSTETRICS & GYNECOLOGY

## 2020-12-07 PROCEDURE — 86870 RBC ANTIBODY IDENTIFICATION: CPT | Performed by: OBSTETRICS & GYNECOLOGY

## 2020-12-07 PROCEDURE — 86850 RBC ANTIBODY SCREEN: CPT | Performed by: OBSTETRICS & GYNECOLOGY

## 2020-12-07 PROCEDURE — 86900 BLOOD TYPING SEROLOGIC ABO: CPT | Performed by: OBSTETRICS & GYNECOLOGY

## 2020-12-07 PROCEDURE — 99207 PR FIRST OB VISIT: CPT | Performed by: OBSTETRICS & GYNECOLOGY

## 2020-12-07 PROCEDURE — 36415 COLL VENOUS BLD VENIPUNCTURE: CPT | Performed by: OBSTETRICS & GYNECOLOGY

## 2020-12-07 PROCEDURE — 85027 COMPLETE CBC AUTOMATED: CPT | Performed by: OBSTETRICS & GYNECOLOGY

## 2020-12-07 PROCEDURE — 86762 RUBELLA ANTIBODY: CPT | Performed by: OBSTETRICS & GYNECOLOGY

## 2020-12-07 PROCEDURE — 87340 HEPATITIS B SURFACE AG IA: CPT | Performed by: OBSTETRICS & GYNECOLOGY

## 2020-12-07 PROCEDURE — 87389 HIV-1 AG W/HIV-1&-2 AB AG IA: CPT | Performed by: OBSTETRICS & GYNECOLOGY

## 2020-12-07 PROCEDURE — 76817 TRANSVAGINAL US OBSTETRIC: CPT | Performed by: OBSTETRICS & GYNECOLOGY

## 2020-12-07 ASSESSMENT — MIFFLIN-ST. JEOR: SCORE: 1355.35
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
## 2020-12-08 ENCOUNTER — TRANSCRIBE ORDERS (OUTPATIENT)
Dept: MATERNAL FETAL MEDICINE | Facility: CLINIC | Age: 40
End: 2020-12-08

## 2020-12-08 DIAGNOSIS — O26.90 PREGNANCY RELATED CONDITION, ANTEPARTUM: Primary | ICD-10-CM

## 2020-12-08 LAB
ABO + RH BLD: ABNORMAL
ABO + RH BLD: ABNORMAL
BACTERIA SPEC CULT: NORMAL
BLD GP AB INVEST PLASRBC-IMP: ABNORMAL
BLD GP AB SCN SERPL QL: ABNORMAL
BLOOD BANK CMNT PATIENT-IMP: ABNORMAL
HBV SURFACE AG SERPL QL IA: NONREACTIVE
HIV 1+2 AB+HIV1 P24 AG SERPL QL IA: NONREACTIVE
Lab: NORMAL
RUBV IGG SERPL IA-ACNC: 24 IU/ML
SPECIMEN EXP DATE BLD: ABNORMAL
SPECIMEN SOURCE: NORMAL
T PALLIDUM AB SER QL: NONREACTIVE

## 2020-12-09 ENCOUNTER — TELEPHONE (OUTPATIENT)
Dept: OBGYN | Facility: CLINIC | Age: 40
End: 2020-12-09

## 2020-12-21 DIAGNOSIS — O09.91 SUPERVISION OF HIGH RISK PREGNANCY IN FIRST TRIMESTER: ICD-10-CM

## 2020-12-21 DIAGNOSIS — Z13.79 GENETIC SCREENING: ICD-10-CM

## 2020-12-21 LAB
ABO + RH BLD: NORMAL
ABO + RH BLD: NORMAL
ALBUMIN UR-MCNC: NEGATIVE MG/DL
APPEARANCE UR: CLEAR
BILIRUB UR QL STRIP: NEGATIVE
BLD GP AB SCN SERPL QL: NORMAL
BLOOD BANK CMNT PATIENT-IMP: NORMAL
COLOR UR AUTO: YELLOW
ERYTHROCYTE [DISTWIDTH] IN BLOOD BY AUTOMATED COUNT: 12.1 % (ref 10–15)
GLUCOSE UR STRIP-MCNC: NEGATIVE MG/DL
HCT VFR BLD AUTO: 36 % (ref 35–47)
HGB BLD-MCNC: 12 G/DL (ref 11.7–15.7)
HGB UR QL STRIP: NEGATIVE
KETONES UR STRIP-MCNC: NEGATIVE MG/DL
LEUKOCYTE ESTERASE UR QL STRIP: NEGATIVE
MCH RBC QN AUTO: 30.6 PG (ref 26.5–33)
MCHC RBC AUTO-ENTMCNC: 33.3 G/DL (ref 31.5–36.5)
MCV RBC AUTO: 92 FL (ref 78–100)
NITRATE UR QL: NEGATIVE
PH UR STRIP: 5.5 PH (ref 5–7)
PLATELET # BLD AUTO: 316 10E9/L (ref 150–450)
RBC # BLD AUTO: 3.92 10E12/L (ref 3.8–5.2)
SOURCE: NORMAL
SP GR UR STRIP: 1.02 (ref 1–1.03)
SPECIMEN EXP DATE BLD: NORMAL
UROBILINOGEN UR STRIP-ACNC: 0.2 EU/DL (ref 0.2–1)
WBC # BLD AUTO: 8.9 10E9/L (ref 4–11)

## 2020-12-21 PROCEDURE — 87340 HEPATITIS B SURFACE AG IA: CPT | Performed by: OBSTETRICS & GYNECOLOGY

## 2020-12-21 PROCEDURE — 99N1100 PR STATISTIC VERIFI PRENATAL TRISOMY 21,18,13: Mod: 90 | Performed by: OBSTETRICS & GYNECOLOGY

## 2020-12-21 PROCEDURE — 86900 BLOOD TYPING SEROLOGIC ABO: CPT | Performed by: OBSTETRICS & GYNECOLOGY

## 2020-12-21 PROCEDURE — 85027 COMPLETE CBC AUTOMATED: CPT | Performed by: OBSTETRICS & GYNECOLOGY

## 2020-12-21 PROCEDURE — 36415 COLL VENOUS BLD VENIPUNCTURE: CPT | Performed by: OBSTETRICS & GYNECOLOGY

## 2020-12-21 PROCEDURE — 86762 RUBELLA ANTIBODY: CPT | Performed by: OBSTETRICS & GYNECOLOGY

## 2020-12-21 PROCEDURE — 86901 BLOOD TYPING SEROLOGIC RH(D): CPT | Performed by: OBSTETRICS & GYNECOLOGY

## 2020-12-21 PROCEDURE — 99000 SPECIMEN HANDLING OFFICE-LAB: CPT | Performed by: OBSTETRICS & GYNECOLOGY

## 2020-12-21 PROCEDURE — 87389 HIV-1 AG W/HIV-1&-2 AB AG IA: CPT | Performed by: OBSTETRICS & GYNECOLOGY

## 2020-12-21 PROCEDURE — 87086 URINE CULTURE/COLONY COUNT: CPT | Performed by: OBSTETRICS & GYNECOLOGY

## 2020-12-21 PROCEDURE — 81003 URINALYSIS AUTO W/O SCOPE: CPT | Performed by: OBSTETRICS & GYNECOLOGY

## 2020-12-21 PROCEDURE — 86850 RBC ANTIBODY SCREEN: CPT | Performed by: OBSTETRICS & GYNECOLOGY

## 2020-12-21 PROCEDURE — 86780 TREPONEMA PALLIDUM: CPT | Mod: 90 | Performed by: OBSTETRICS & GYNECOLOGY

## 2020-12-22 LAB
BACTERIA SPEC CULT: NORMAL
HBV SURFACE AG SERPL QL IA: NONREACTIVE
HIV 1+2 AB+HIV1 P24 AG SERPL QL IA: NONREACTIVE
Lab: NORMAL
RUBV IGG SERPL IA-ACNC: 23 IU/ML
SPECIMEN SOURCE: NORMAL
T PALLIDUM AB SER QL: NONREACTIVE

## 2020-12-28 ENCOUNTER — PRE VISIT (OUTPATIENT)
Dept: MATERNAL FETAL MEDICINE | Facility: CLINIC | Age: 40
End: 2020-12-28

## 2020-12-28 LAB — LAB SCANNED RESULT: NORMAL

## 2020-12-31 NOTE — PROGRESS NOTES
[facility]  Genetic Counseling Consult    Patient: An Roman YOB: 1980   Date of Service: 21      An Roman was seen at [facility] for genetic consultation to discuss the options for screening and testing for fetal chromosome abnormalities.  The indication for genetic counseling is advanced maternal age.        Impression/Plan:   1.  An had a genetic counseling session only and a level II ultrasound.      2. An had cell-free DNA screening drawn at her primary OB office earlier in pregnancy, and the results were negative.    3.  Maternal serum AFP (single marker screen) is recommended after 15 weeks to screen for open neural tube defects. A quad screen should not be performed.    4.  An 18-20 week comprehensive ultrasound is standard of care for all women 35 or older at delivery.    Pregnancy History:   /Parity:    Age at Delivery: 40 year old  CASIE: 2021, by Last Menstrual Period  Gestational Age: 11w4d    No significant complications or exposures were reported in the current pregnancy.    An corrigan pregnancy history is significant for:  o 39w3d  male 2017  o SAB 2019  o SAB 9w4d 2020    Medical History:   An corrigan reported medical history is not expected to impact pregnancy management or risks to fetal development.       Family History:   A three-generation pedigree was obtained, and is scanned under the  Media  tab.   The following significant findings were reported by An:    An's nephew has hearing loss that requires hearing aids that he started wearing in 3rd or 4th grade. He reportedly passed his  hearing screen.     An's , Jack, is 38 years old and healthy.     Jack's sister is currently pregnant after having three miscarriages.    Otherwise, the reported family history is negative for multiple miscarriages,  stillbirths, birth defects, cognitive impairment, known genetic conditions, and consanguinity.    We reviewed that hearing loss is relatively common in the human population in that profound congenital hearing loss is estimated to occur in about 1 in 1000 births.  Approximately half of cases are thought to be due to environmental factors (acoustic trauma, ototoxic drug exposure, and bacterial or viral infections such as rubella or cytomegalovirus) and half due to genetic causes. The majority (70%) of cases of congenital deafness associated with genetic factors are classified as non-syndromic (the deafness is not associated with other clinical findings that define a recognized syndrome). In the remaining 30%, one of more than 400 forms of syndromic deafness can be diagnosed because of other associated clinical findings. It does not appear that An's nephew's deafness is syndromic, it also appears his case is a simplex case. It is difficult to determine the recurrence risk for family members without a known cause for the hearing loss. Inheritance patterns of genetic hearing loss can include AD, AR, X-linked and mitochondrial.         Recurrent miscarriage is defined as three or more clinically recognized consecutive or non-consecutive pregnancy losses occurring prior to fetal viability (<24 weeks). We reviewed that at least 10-15% of the recognized pregnancies end in miscarriage, with most losses occurring in the first trimester. The rate of miscarriage less than 8 weeks gestation may be even greater as some women may not recognize that they are pregnant. Around half of miscarriages that occur before 20 weeks gestation are caused by chromosome abnormalities. The risk for a miscarriage increases with advancing maternal age due to a higher incidence of conceptuses with a chromosomal aneuploidy. The risk may approach 75% in women who are 45 years of age and older. In about 50% of couples with recurrent pregnancy loss,  the etiology remains unknown despite a thorough evaluation and is therefore classified as idiopathic. It is estimated that couples with idiopathic recurrent pregnacy loss can have up to a 75% chance of having a successful pregnancy.    Commonly reported causes of recurrent pregnancy loss include the following:    Endocrine:    Uncontrolled diabetes mellitus     Luteal phase deficiency (remains inconclusive, limited to 1st trimester)    Polycystic ovary syndrome    Environmental agents:     Prolonged exposure to alcohol, smoking, cocaine     Immunologic    Antiphospholipid syndrome    Maternal factors (acquired, inherited, structural):    Uterine anatomic malformations    Myomas    Cervical abnormalities    Chromosomal and single gene disorders:    Fetal chromosomal abnormalities    Parental balanced translocation    Alpha thalassemia major    Thrombophilia    X-linked male lethal conditions      Carrier Screening:   The patient reports that she and the father of the pregnancy have  ancestry:      Cystic fibrosis is an autosomal recessive genetic condition that occurs with increased frequency in individuals of  ancestry and carrier screening for this condition is available.  In addition,  screening in the [facility] includes cystic fibrosis.        Expanded carrier screening for mutations in a large panel of genes associated with autosomal recessive conditions including cystic fibrosis, spinal muscular atrophy, and others, is now available.      Carrier screening was not discussed today.       Risk Assessment for Chromosome Conditions:   We explained that the risk for fetal chromosome abnormalities increases with maternal age. We discussed specific features of common chromosome abnormalities, including Down syndrome, trisomy 13, trisomy 18, and sex chromosome trisomies.      - At age 40 at midtrimester, the risk to have a baby with Down syndrome is 1 in 56.     - At age 40 at  midtrimester, the risk to have a baby with any chromosome abnormality is 1 in 31.     - At age 40 at delivery, the risk to have a baby with Down syndrome is 1 in 89.     - At age 40 at delivery, the risk to have a baby with any chromosome abnormality is 1 in 51.          An had maternal serum screening earlier in pregnancy.     Non-invasive Prenatal Testing (NIPT)    Maternal plasma cell-free DNA testing    Screens for fetal trisomy 21, trisomy 13, trisomy 18, and sex chromosome aneuploidy    An had an Innatal test earlier in pregnancy; we reviewed the results today, which are normal for chromosome 13, chromosome 18 and chromosome 21 (no aneuploidy detected)    Given the accuracy of this test, these results greatly decrease the chance for certain fetal chromosome abnormalities    We discussed the limitations of normal NIPT results    Testing Options:   We discussed the following options:   Non-invasive Prenatal Testing (NIPT)    Maternal plasma cell-free DNA testing; first trimester ultrasound with nuchal translucency and nasal bone assessment is recommended, when appropriate    Screens for fetal trisomy 21, trisomy 13, trisomy 18, and sex chromosome aneuploidy    Cannot screen for open neural tube defects; maternal serum AFP after 15 weeks is recommended       Comprehensive (Level II) ultrasound: Detailed ultrasound performed between 18-22 weeks gestation to screen for major birth defects and markers for aneuploidy.      We reviewed the benefits and limitations of this testing.  Screening tests provide a risk assessment specific to the pregnancy for certain fetal chromosome abnormalities, but cannot definitively diagnose or exclude a fetal chromosome abnormality.  Follow-up genetic counseling and consideration of diagnostic testing is recommended with any abnormal screening result.     Diagnostic tests carry inherent risks- including risk of miscarriage- that require careful consideration.  These tests  can detect fetal chromosome abnormalities with greater than 99% certainty.  Results can be compromised by maternal cell contamination or mosaicism, and are limited by the resolution of cytogenetic G-banding technology.  There is no screening nor diagnostic test that can detect all forms of birth defects or mental disability.     It was a pleasure to be involved with An corrigan care. Face-to-face time of the meeting was 15 minutes.      Lyndsay Graves MS, [facility]  Genetic Counselor  Maternal Fetal Medicine  [facility]   Phone: [phone]  Pager: [phone]  Email: [email or web address]

## 2021-01-04 ENCOUNTER — OFFICE VISIT (OUTPATIENT)
Dept: MATERNAL FETAL MEDICINE | Facility: CLINIC | Age: 41
End: 2021-01-04
Attending: OBSTETRICS & GYNECOLOGY
Payer: COMMERCIAL

## 2021-01-04 ENCOUNTER — PRENATAL OFFICE VISIT (OUTPATIENT)
Dept: OBGYN | Facility: CLINIC | Age: 41
End: 2021-01-04
Payer: COMMERCIAL

## 2021-01-04 ENCOUNTER — HOSPITAL ENCOUNTER (OUTPATIENT)
Dept: ULTRASOUND IMAGING | Facility: CLINIC | Age: 41
End: 2021-01-04
Attending: OBSTETRICS & GYNECOLOGY
Payer: COMMERCIAL

## 2021-01-04 VITALS — DIASTOLIC BLOOD PRESSURE: 62 MMHG | BODY MASS INDEX: 26.46 KG/M2 | WEIGHT: 159 LBS | SYSTOLIC BLOOD PRESSURE: 104 MMHG

## 2021-01-04 DIAGNOSIS — O09.91 SUPERVISION OF HIGH RISK PREGNANCY IN FIRST TRIMESTER: ICD-10-CM

## 2021-01-04 DIAGNOSIS — O09.521 AMA (ADVANCED MATERNAL AGE) MULTIGRAVIDA 35+, FIRST TRIMESTER: Primary | ICD-10-CM

## 2021-01-04 DIAGNOSIS — O26.90 PREGNANCY RELATED CONDITION, ANTEPARTUM: ICD-10-CM

## 2021-01-04 DIAGNOSIS — O09.521 MULTIGRAVIDA OF ADVANCED MATERNAL AGE IN FIRST TRIMESTER: Primary | ICD-10-CM

## 2021-01-04 DIAGNOSIS — O34.219 HISTORY OF CESAREAN SECTION COMPLICATING PREGNANCY: ICD-10-CM

## 2021-01-04 DIAGNOSIS — O09.529 ANTEPARTUM MULTIGRAVIDA OF ADVANCED MATERNAL AGE: Primary | ICD-10-CM

## 2021-01-04 PROCEDURE — 99207 PR PRENATAL VISIT: CPT | Performed by: OBSTETRICS & GYNECOLOGY

## 2021-01-04 PROCEDURE — 76801 OB US < 14 WKS SINGLE FETUS: CPT | Mod: 26 | Performed by: OBSTETRICS & GYNECOLOGY

## 2021-01-04 PROCEDURE — 999N000069 HC STATISTIC GENETIC COUNSELING, < 16 MIN: Performed by: GENETIC COUNSELOR, MS

## 2021-01-04 PROCEDURE — 76801 OB US < 14 WKS SINGLE FETUS: CPT

## 2021-01-04 NOTE — PROGRESS NOTES
"Please see \"Imaging\" tab under \"Chart Review\" for details of today's US.    Arabella Gilbert, DO    "

## 2021-01-04 NOTE — PROGRESS NOTES
Good fm  Normal inatal  Due for afp at 16 wks  Has first trimester mfm consult today  Plan level II us with mfm at 18 wks  See me at 20 wks

## 2021-01-15 ENCOUNTER — HEALTH MAINTENANCE LETTER (OUTPATIENT)
Age: 41
End: 2021-01-15

## 2021-02-02 ENCOUNTER — PRENATAL OFFICE VISIT (OUTPATIENT)
Dept: OBGYN | Facility: CLINIC | Age: 41
End: 2021-02-02
Payer: COMMERCIAL

## 2021-02-02 VITALS — BODY MASS INDEX: 27.96 KG/M2 | DIASTOLIC BLOOD PRESSURE: 68 MMHG | SYSTOLIC BLOOD PRESSURE: 130 MMHG | WEIGHT: 168 LBS

## 2021-02-02 DIAGNOSIS — O09.522 AMA (ADVANCED MATERNAL AGE) MULTIGRAVIDA 35+, SECOND TRIMESTER: ICD-10-CM

## 2021-02-02 DIAGNOSIS — O09.92 SUPERVISION OF HIGH RISK PREGNANCY IN SECOND TRIMESTER: Primary | ICD-10-CM

## 2021-02-02 DIAGNOSIS — Z36.1 ENCOUNTER FOR ANTENATAL SCREENING FOR HIGH ALPHA-FETOPROTEIN LEVEL: ICD-10-CM

## 2021-02-02 PROCEDURE — 36415 COLL VENOUS BLD VENIPUNCTURE: CPT | Performed by: OBSTETRICS & GYNECOLOGY

## 2021-02-02 PROCEDURE — 99207 PR PRENATAL VISIT: CPT | Performed by: OBSTETRICS & GYNECOLOGY

## 2021-02-02 PROCEDURE — 99000 SPECIMEN HANDLING OFFICE-LAB: CPT | Performed by: OBSTETRICS & GYNECOLOGY

## 2021-02-02 PROCEDURE — 82105 ALPHA-FETOPROTEIN SERUM: CPT | Mod: 90 | Performed by: OBSTETRICS & GYNECOLOGY

## 2021-02-02 NOTE — PROGRESS NOTES
afp today  Has level II with mfm coming up  Had a normal inatal screen  Also had early scan with MFM due to AMA  See me 4 wks here  Start Baby ASA daily due to age and increased risk of pre eclampsia developing at her age over 40  Pressures good so far but systolic has increased to 130 from 102 in early pregnancy since she generally runs low when not pregnant.

## 2021-02-04 LAB
# FETUSES US: NORMAL
# FETUSES: NORMAL
AFP ADJ MOM AMN: 1.01
AFP SERPL-MCNC: 31 NG/ML
AGE - REPORTED: 41.8 YR
CURRENT SMOKER: NO
CURRENT SMOKER: NO
DIABETES STATUS PATIENT: NO
FAMILY MEMBER DISEASES HX: NO
FAMILY MEMBER DISEASES HX: NO
GA METHOD: NORMAL
GA METHOD: NORMAL
GA: NORMAL WK
IDDM PATIENT QL: NO
INTEGRATED SCN PATIENT-IMP: NORMAL
LMP START DATE: NORMAL
MONOCHORIONIC TWINS: NO
SERVICE CMNT-IMP: NO
SPECIMEN DRAWN SERPL: NORMAL
VALPROIC/CARBAMAZEPINE STATUS: NO
WEIGHT UNITS: NORMAL

## 2021-02-22 ENCOUNTER — OFFICE VISIT (OUTPATIENT)
Dept: MATERNAL FETAL MEDICINE | Facility: CLINIC | Age: 41
End: 2021-02-22
Attending: OBSTETRICS & GYNECOLOGY
Payer: COMMERCIAL

## 2021-02-22 ENCOUNTER — HOSPITAL ENCOUNTER (OUTPATIENT)
Dept: ULTRASOUND IMAGING | Facility: CLINIC | Age: 41
End: 2021-02-22
Attending: OBSTETRICS & GYNECOLOGY
Payer: COMMERCIAL

## 2021-02-22 DIAGNOSIS — O09.522 MULTIGRAVIDA OF ADVANCED MATERNAL AGE IN SECOND TRIMESTER: Primary | ICD-10-CM

## 2021-02-22 DIAGNOSIS — O09.529 ANTEPARTUM MULTIGRAVIDA OF ADVANCED MATERNAL AGE: ICD-10-CM

## 2021-02-22 PROCEDURE — 76811 OB US DETAILED SNGL FETUS: CPT

## 2021-02-22 PROCEDURE — 76811 OB US DETAILED SNGL FETUS: CPT | Mod: 26 | Performed by: OBSTETRICS & GYNECOLOGY

## 2021-03-03 ENCOUNTER — PRENATAL OFFICE VISIT (OUTPATIENT)
Dept: OBGYN | Facility: CLINIC | Age: 41
End: 2021-03-03
Payer: COMMERCIAL

## 2021-03-03 VITALS — WEIGHT: 179 LBS | SYSTOLIC BLOOD PRESSURE: 102 MMHG | DIASTOLIC BLOOD PRESSURE: 50 MMHG | BODY MASS INDEX: 29.79 KG/M2

## 2021-03-03 DIAGNOSIS — O09.92 SUPERVISION OF HIGH RISK PREGNANCY IN SECOND TRIMESTER: Primary | ICD-10-CM

## 2021-03-03 DIAGNOSIS — O09.522 AMA (ADVANCED MATERNAL AGE) MULTIGRAVIDA 35+, SECOND TRIMESTER: ICD-10-CM

## 2021-03-03 DIAGNOSIS — O34.219 HISTORY OF CESAREAN SECTION COMPLICATING PREGNANCY: ICD-10-CM

## 2021-03-03 PROCEDURE — 99207 PR PRENATAL VISIT: CPT | Performed by: OBSTETRICS & GYNECOLOGY

## 2021-03-03 RX ORDER — ASPIRIN 81 MG/1
81 TABLET, CHEWABLE ORAL DAILY
COMMUNITY
End: 2021-06-29

## 2021-03-03 NOTE — PROGRESS NOTES
Good fm  bp good  Taking daily ASA  Plan repeat c section Sunday July 11  Start bpp at 32 with  Growth us at 32 week  Return 4 wk

## 2021-03-17 ENCOUNTER — HOSPITAL ENCOUNTER (EMERGENCY)
Facility: CLINIC | Age: 41
Discharge: HOME | End: 2021-03-17
Attending: EMERGENCY MEDICINE | Admitting: EMERGENCY MEDICINE
Payer: COMMERCIAL

## 2021-03-17 ENCOUNTER — TELEPHONE (OUTPATIENT)
Dept: OBGYN | Facility: CLINIC | Age: 41
End: 2021-03-17

## 2021-03-17 VITALS
HEIGHT: 65 IN | OXYGEN SATURATION: 100 % | WEIGHT: 180 LBS | TEMPERATURE: 98.4 F | HEART RATE: 84 BPM | BODY MASS INDEX: 29.99 KG/M2 | DIASTOLIC BLOOD PRESSURE: 73 MMHG | SYSTOLIC BLOOD PRESSURE: 111 MMHG | RESPIRATION RATE: 18 BRPM

## 2021-03-17 DIAGNOSIS — K52.9 GASTROENTERITIS: ICD-10-CM

## 2021-03-17 LAB
ALBUMIN SERPL-MCNC: 2.8 G/DL (ref 3.4–5)
ALP SERPL-CCNC: 78 U/L (ref 40–150)
ALT SERPL W P-5'-P-CCNC: 18 U/L (ref 0–50)
ANION GAP SERPL CALCULATED.3IONS-SCNC: 7 MMOL/L (ref 3–14)
AST SERPL W P-5'-P-CCNC: 16 U/L (ref 0–45)
BASOPHILS # BLD AUTO: 0 10E9/L (ref 0–0.2)
BASOPHILS NFR BLD AUTO: 0.2 %
BILIRUB SERPL-MCNC: 0.3 MG/DL (ref 0.2–1.3)
BUN SERPL-MCNC: 10 MG/DL (ref 7–30)
CALCIUM SERPL-MCNC: 8.2 MG/DL (ref 8.5–10.1)
CHLORIDE SERPL-SCNC: 107 MMOL/L (ref 94–109)
CO2 SERPL-SCNC: 23 MMOL/L (ref 20–32)
CREAT SERPL-MCNC: 0.64 MG/DL (ref 0.52–1.04)
DIFFERENTIAL METHOD BLD: ABNORMAL
EOSINOPHIL # BLD AUTO: 0 10E9/L (ref 0–0.7)
EOSINOPHIL NFR BLD AUTO: 0.1 %
ERYTHROCYTE [DISTWIDTH] IN BLOOD BY AUTOMATED COUNT: 12.8 % (ref 10–15)
FLUAV RNA RESP QL NAA+PROBE: NEGATIVE
FLUBV RNA RESP QL NAA+PROBE: NEGATIVE
GFR SERPL CREATININE-BSD FRML MDRD: >90 ML/MIN/{1.73_M2}
GLUCOSE SERPL-MCNC: 79 MG/DL (ref 70–99)
HCT VFR BLD AUTO: 34.3 % (ref 35–47)
HGB BLD-MCNC: 11.6 G/DL (ref 11.7–15.7)
IMM GRANULOCYTES # BLD: 0.1 10E9/L (ref 0–0.4)
IMM GRANULOCYTES NFR BLD: 0.4 %
LABORATORY COMMENT REPORT: NORMAL
LYMPHOCYTES # BLD AUTO: 1.2 10E9/L (ref 0.8–5.3)
LYMPHOCYTES NFR BLD AUTO: 9.1 %
MCH RBC QN AUTO: 30.5 PG (ref 26.5–33)
MCHC RBC AUTO-ENTMCNC: 33.8 G/DL (ref 31.5–36.5)
MCV RBC AUTO: 90 FL (ref 78–100)
MONOCYTES # BLD AUTO: 0.7 10E9/L (ref 0–1.3)
MONOCYTES NFR BLD AUTO: 5.6 %
NEUTROPHILS # BLD AUTO: 11 10E9/L (ref 1.6–8.3)
NEUTROPHILS NFR BLD AUTO: 84.6 %
NRBC # BLD AUTO: 0 10*3/UL
NRBC BLD AUTO-RTO: 0 /100
PLATELET # BLD AUTO: 321 10E9/L (ref 150–450)
POTASSIUM SERPL-SCNC: 3.8 MMOL/L (ref 3.4–5.3)
PROT SERPL-MCNC: 6.5 G/DL (ref 6.8–8.8)
RBC # BLD AUTO: 3.8 10E12/L (ref 3.8–5.2)
RSV RNA SPEC QL NAA+PROBE: NORMAL
SARS-COV-2 RNA RESP QL NAA+PROBE: NEGATIVE
SODIUM SERPL-SCNC: 137 MMOL/L (ref 133–144)
SPECIMEN SOURCE: NORMAL
WBC # BLD AUTO: 13 10E9/L (ref 4–11)

## 2021-03-17 PROCEDURE — 87636 SARSCOV2 & INF A&B AMP PRB: CPT | Performed by: EMERGENCY MEDICINE

## 2021-03-17 PROCEDURE — C9803 HOPD COVID-19 SPEC COLLECT: HCPCS

## 2021-03-17 PROCEDURE — 80053 COMPREHEN METABOLIC PANEL: CPT | Performed by: EMERGENCY MEDICINE

## 2021-03-17 PROCEDURE — 250N000011 HC RX IP 250 OP 636: Performed by: EMERGENCY MEDICINE

## 2021-03-17 PROCEDURE — 96374 THER/PROPH/DIAG INJ IV PUSH: CPT

## 2021-03-17 PROCEDURE — 99284 EMERGENCY DEPT VISIT MOD MDM: CPT | Mod: 25

## 2021-03-17 PROCEDURE — 96376 TX/PRO/DX INJ SAME DRUG ADON: CPT

## 2021-03-17 PROCEDURE — 258N000003 HC RX IP 258 OP 636: Performed by: EMERGENCY MEDICINE

## 2021-03-17 PROCEDURE — 96361 HYDRATE IV INFUSION ADD-ON: CPT

## 2021-03-17 PROCEDURE — 85025 COMPLETE CBC W/AUTO DIFF WBC: CPT | Performed by: EMERGENCY MEDICINE

## 2021-03-17 RX ORDER — ONDANSETRON 4 MG/1
4 TABLET, ORALLY DISINTEGRATING ORAL EVERY 8 HOURS PRN
Qty: 10 TABLET | Refills: 0 | Status: SHIPPED | OUTPATIENT
Start: 2021-03-17 | End: 2021-03-20

## 2021-03-17 RX ORDER — ONDANSETRON 2 MG/ML
4 INJECTION INTRAMUSCULAR; INTRAVENOUS ONCE
Status: COMPLETED | OUTPATIENT
Start: 2021-03-17 | End: 2021-03-17

## 2021-03-17 RX ORDER — SODIUM CHLORIDE 9 MG/ML
INJECTION, SOLUTION INTRAVENOUS CONTINUOUS
Status: DISCONTINUED | OUTPATIENT
Start: 2021-03-17 | End: 2021-03-17 | Stop reason: HOSPADM

## 2021-03-17 RX ADMIN — ONDANSETRON 4 MG: 2 INJECTION INTRAMUSCULAR; INTRAVENOUS at 18:47

## 2021-03-17 RX ADMIN — SODIUM CHLORIDE 1000 ML: 9 INJECTION, SOLUTION INTRAVENOUS at 16:41

## 2021-03-17 RX ADMIN — ONDANSETRON 4 MG: 2 INJECTION INTRAMUSCULAR; INTRAVENOUS at 16:42

## 2021-03-17 RX ADMIN — SODIUM CHLORIDE 1000 ML: 9 INJECTION, SOLUTION INTRAVENOUS at 16:42

## 2021-03-17 ASSESSMENT — ENCOUNTER SYMPTOMS
ABDOMINAL PAIN: 1
RHINORRHEA: 1
VOMITING: 1
DIARRHEA: 1
NAUSEA: 1

## 2021-03-17 ASSESSMENT — MIFFLIN-ST. JEOR: SCORE: 1482.35

## 2021-03-17 NOTE — PLAN OF CARE
Asked to come evaluate fetal heart tones. 22+3, , in ED for nausea/vomiting. FHT's 155. Denies uterine cramping, leaking of fluid, vaginal bleeding. Reports + fetal movement.

## 2021-03-17 NOTE — TELEPHONE ENCOUNTER
22w3d  Patient has had nasal congestion for 4-5 days. Since has progressed and now one nostril is blocked and cant breathe out of it. Denies fever. Denies shortness of breath. Does feel weak. Has been up since 7:30am vomiting continuously, ever hour or so. Is currently drinking Pedialyte. But vomits right away. Also now has developed diarrhea. No vomiting yesterday came on today.   Noting also has numbness in both arms.     Discussed should be evaluated in ER asap.    Nabila Luna RN

## 2021-03-17 NOTE — ED PROVIDER NOTES
"  History   Chief Complaint:  Nausea and emesis     HPI   An Roman is a 22 week pregnant 40 year old female with history of sinus infections who presents with nausea and emesis. The patient began having congestion and rhinorrhea last week. 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 Her rhinorrhea has been getting better throughout the week. She began having nausea and emesis this morning at 0600. She has been unable to keep anything down since that time. She also endorses abdominal pain here in the ED. She notes having one episode of diarrhea yesterday. She has had a C section in the past but no other abdominal surgeries.     Review of Systems   HENT: Positive for congestion and rhinorrhea.    Gastrointestinal: Positive for abdominal pain, diarrhea, nausea and vomiting.   All other systems reviewed and are negative.      Allergies:  The patient has no known allergies.     Medications:  ASA 81 mg    Past Medical History:    Exercise induced bronchospasm  Pyelonephritis  Asthma  Eczema    Past Surgical History:    Breast augmentation  C section  [place] teeth removed    Family History:    Hypertension  Cancer  Diabetes  Hyperlipidemia    Social History:  The patient presents with her spouse.    Physical Exam     Patient Vitals for the past 24 hrs:   BP Temp Temp src Pulse Resp SpO2 Height Weight   03/17/21 1849 -- -- -- 84 -- 100 % -- --   03/17/21 1740 -- -- -- -- -- 100 % -- --   03/17/21 1730 111/73 -- -- 93 -- 100 % -- --   03/17/21 1715 111/64 -- -- -- -- 100 % -- --   03/17/21 1700 -- -- -- -- -- 98 % -- --   03/17/21 1645 -- -- -- -- -- 97 % -- --   03/17/21 1437 123/73 98.4  F (36.9  C) Temporal 101 18 99 % 1.651 m (5' 5\") 81.6 kg (180 lb)       Physical Exam  GENERAL: well developed, pleasant  HEAD: atraumatic  EYES: pupils reactive, extraocular muscles intact, conjunctivae normal  ENT:  mucus membranes moist  NECK:  " trachea midline, normal range of motion  RESPIRATORY: no tachypnea, breath sounds clear to auscultation   CVS: normal S1/S2, no murmurs, intact distal pulses  ABDOMEN: Gravid abdomen.  MUSCULOSKELETAL: no deformities  SKIN: warm and dry, no acute rashes or ulceration  NEURO: GCS 15, cranial nerves intact, alert and oriented x3  PSYCH:  Mood/affect normal    Emergency Department Course   Laboratory:    CBC: WBC: 13.0 (H), HGB: 11.6 (L), PLT: 321  CMP: Calcium: 8.2 (L), Albumin: 2.8 (L), Protein Total: 6.5 (L), o/w WNL (Creatinine: 0.64)  Symptomatic Influenza A/B & SARS-CoV2 (COVID19) Virus PCR Multiplex: Negative     Emergency Department Course:    Reviewed:    I reviewed the patient's nursing notes, vitals, past medical records, Care Everywhere.     Assessments:    1630: I performed an exam of the patient and obtained history, as documented above.    1830: I rechecked the patient and updated them on findings. They are amenable to discharge.     Interventions:   1641: NS 1L IV Bolus   1642: NS 1L IV Bolus   1642: Zofran 4 mg IV  1847: Zofran 4 mg IV    Disposition:  The patient was discharged to home.       Impression & Plan   Medical Decision Making:    Patient presents with nausea and vomiting without any focal pain.  Also has some minor upper respiratory symptoms.  She is currently pregnant.  She is given 2 L of fluid and Zofran with improvement of symptoms.  Covid is negative.  Labs are unrevealing and vital signs are stable.  With the vomiting and diarrhea likely gastroenteritis.  Discussed supportive care with her.  OB did come down to evaluate her as well.    Covid-19  An Roman was evaluated during a global COVID-19 pandemic, which necessitated consideration that the patient might be at risk for infection with the SARS-CoV-2 virus that causes COVID-19.   Applicable protocols for evaluation were followed during the patient's care.   COVID-19 was considered as part of the patient's evaluation. The  plan for testing is:  a test was obtained during this visit.    Diagnosis:    ICD-10-CM    1. Gastroenteritis  K52.9        Discharge Medications:  Discharge Medication List as of 3/17/2021  6:39 PM      START taking these medications    Details   ondansetron (ZOFRAN ODT) 4 MG ODT tab Take 1 tablet (4 mg) by mouth every 8 hours as needed for nausea, Disp-10 tablet, R-0, Local Print             Scribe Disclosure:  IToma, am serving as a scribe at 4:23 PM on 3/17/2021 to document services personally performed by Quirino Clayton MD based on my observations and the provider's statements to me.              Quirino Clayton MD  03/17/21 6721       Quirino Clayton MD  03/17/21 [id number]

## 2021-04-06 ENCOUNTER — PRENATAL OFFICE VISIT (OUTPATIENT)
Dept: OBGYN | Facility: CLINIC | Age: 41
End: 2021-04-06

## 2021-04-06 VITALS — DIASTOLIC BLOOD PRESSURE: 66 MMHG | BODY MASS INDEX: 30.29 KG/M2 | WEIGHT: 182 LBS | SYSTOLIC BLOOD PRESSURE: 108 MMHG

## 2021-04-06 DIAGNOSIS — O09.522 AMA (ADVANCED MATERNAL AGE) MULTIGRAVIDA 35+, SECOND TRIMESTER: ICD-10-CM

## 2021-04-06 DIAGNOSIS — O09.92 SUPERVISION OF HIGH RISK PREGNANCY IN SECOND TRIMESTER: Primary | ICD-10-CM

## 2021-04-06 DIAGNOSIS — O34.219 HISTORY OF CESAREAN SECTION COMPLICATING PREGNANCY: ICD-10-CM

## 2021-04-06 PROCEDURE — 99207 PR PRENATAL VISIT: CPT | Performed by: OBSTETRICS & GYNECOLOGY

## 2021-05-05 DIAGNOSIS — Z23 NEED FOR TDAP VACCINATION: ICD-10-CM

## 2021-05-05 DIAGNOSIS — Z36.9 ENCOUNTER FOR ANTENATAL SCREENING OF MOTHER: Primary | ICD-10-CM

## 2021-05-05 DIAGNOSIS — Z29.13 NEED FOR RHOGAM DUE TO RH NEGATIVE MOTHER: ICD-10-CM

## 2021-05-11 ENCOUNTER — PRENATAL OFFICE VISIT (OUTPATIENT)
Dept: OBGYN | Facility: CLINIC | Age: 41
End: 2021-05-11
Payer: COMMERCIAL

## 2021-05-11 ENCOUNTER — PREP FOR PROCEDURE (OUTPATIENT)
Dept: OBGYN | Facility: CLINIC | Age: 41
End: 2021-05-11

## 2021-05-11 VITALS — DIASTOLIC BLOOD PRESSURE: 60 MMHG | BODY MASS INDEX: 31.65 KG/M2 | SYSTOLIC BLOOD PRESSURE: 90 MMHG | WEIGHT: 190.2 LBS

## 2021-05-11 DIAGNOSIS — O09.91 SUPERVISION OF HIGH RISK PREGNANCY IN FIRST TRIMESTER: ICD-10-CM

## 2021-05-11 DIAGNOSIS — Z29.13 NEED FOR RHOGAM DUE TO RH NEGATIVE MOTHER: ICD-10-CM

## 2021-05-11 DIAGNOSIS — O09.523 AMA (ADVANCED MATERNAL AGE) MULTIGRAVIDA 35+, THIRD TRIMESTER: ICD-10-CM

## 2021-05-11 DIAGNOSIS — O09.522 AMA (ADVANCED MATERNAL AGE) MULTIGRAVIDA 35+, SECOND TRIMESTER: Primary | ICD-10-CM

## 2021-05-11 DIAGNOSIS — O34.219 HISTORY OF CESAREAN SECTION COMPLICATING PREGNANCY: Primary | ICD-10-CM

## 2021-05-11 DIAGNOSIS — Z23 NEED FOR TDAP VACCINATION: ICD-10-CM

## 2021-05-11 DIAGNOSIS — Z36.9 ENCOUNTER FOR ANTENATAL SCREENING OF MOTHER: ICD-10-CM

## 2021-05-11 DIAGNOSIS — O34.219 HISTORY OF CESAREAN SECTION COMPLICATING PREGNANCY: ICD-10-CM

## 2021-05-11 LAB
BLD GP AB SCN SERPL QL: NORMAL
ERYTHROCYTE [DISTWIDTH] IN BLOOD BY AUTOMATED COUNT: 12.9 % (ref 10–15)
GLUCOSE 1H P 50 G GLC PO SERPL-MCNC: 130 MG/DL (ref 60–129)
HCT VFR BLD AUTO: 32 % (ref 35–47)
HGB BLD-MCNC: 10.3 G/DL (ref 11.7–15.7)
MCH RBC QN AUTO: 30.6 PG (ref 26.5–33)
MCHC RBC AUTO-ENTMCNC: 32.2 G/DL (ref 31.5–36.5)
MCV RBC AUTO: 95 FL (ref 78–100)
PLATELET # BLD AUTO: 334 10E9/L (ref 150–450)
RBC # BLD AUTO: 3.37 10E12/L (ref 3.8–5.2)
WBC # BLD AUTO: 13.6 10E9/L (ref 4–11)

## 2021-05-11 PROCEDURE — 99207 PR PRENATAL VISIT: CPT | Performed by: OBSTETRICS & GYNECOLOGY

## 2021-05-11 PROCEDURE — 90471 IMMUNIZATION ADMIN: CPT | Performed by: OBSTETRICS & GYNECOLOGY

## 2021-05-11 PROCEDURE — 86850 RBC ANTIBODY SCREEN: CPT | Performed by: OBSTETRICS & GYNECOLOGY

## 2021-05-11 PROCEDURE — 36415 COLL VENOUS BLD VENIPUNCTURE: CPT | Performed by: OBSTETRICS & GYNECOLOGY

## 2021-05-11 PROCEDURE — 90715 TDAP VACCINE 7 YRS/> IM: CPT | Performed by: OBSTETRICS & GYNECOLOGY

## 2021-05-11 PROCEDURE — 85027 COMPLETE CBC AUTOMATED: CPT | Performed by: OBSTETRICS & GYNECOLOGY

## 2021-05-11 PROCEDURE — 96372 THER/PROPH/DIAG INJ SC/IM: CPT | Performed by: OBSTETRICS & GYNECOLOGY

## 2021-05-11 PROCEDURE — 82950 GLUCOSE TEST: CPT | Performed by: OBSTETRICS & GYNECOLOGY

## 2021-05-11 NOTE — NURSING NOTE
Clinic Administered Medication Documentation      Injectable Medication Documentation    Patient was given Rhogam. Prior to medication administration, verified patients identity using patient s name and date of birth. Please see MAR and medication order for additional information. Patient instructed to report any adverse reaction to staff immediately .      Was entire vial of medication used? Yes  Vial/Syringe: Syringe  Expiration Date:  8/15/2022  Was this medication supplied by the patient? No     Noelle Koroma MA on 5/11/2021 at 1:14 PM    Prior to immunization administration, verified patients identity using patient s name and date of birth. Please see Immunization Activity for additional information.     Screening Questionnaire for Adult Immunization    Are you sick today?   No   Do you have allergies to medications, food, a vaccine component or latex?   No   Have you ever had a serious reaction after receiving a vaccination?   No   Do you have a long-term health problem with heart, lung, kidney, or metabolic disease (e.g., diabetes), asthma, a blood disorder, no spleen, complement component deficiency, a cochlear implant, or a spinal fluid leak?  Are you on long-term aspirin therapy?   No   Do you have cancer, leukemia, HIV/AIDS, or any other immune system problem?   No   Do you have a parent, brother, or sister with an immune system problem?   No   In the past 3 months, have you taken medications that affect  your immune system, such as prednisone, other steroids, or anticancer drugs; drugs for the treatment of rheumatoid arthritis, Crohn s disease, or psoriasis; or have you had radiation treatments?   No   Have you had a seizure, or a brain or other nervous system problem?   No   During the past year, have you received a transfusion of blood or blood    products, or been given immune (gamma) globulin or antiviral drug?   No   For women: Are you pregnant or is there a chance you could become       pregnant  during the next month? Currently pregnant   Have you received any vaccinations in the past 4 weeks?   No     Immunization questionnaire answers were all negative.        Per orders of Dr. Lozano, injection of Tdap given by Noelle Koroma CMA. Patient instructed to remain in clinic for 15 minutes afterwards, and to report any adverse reaction to me immediately.       Screening performed by Noelle Koroma MA on 5/11/2021 at 1:15 PM.

## 2021-05-12 ENCOUNTER — TELEPHONE (OUTPATIENT)
Dept: OBGYN | Facility: CLINIC | Age: 41
End: 2021-05-12

## 2021-05-12 ENCOUNTER — HOSPITAL ENCOUNTER (INPATIENT)
Facility: CLINIC | Age: 41
Setting detail: SURGERY ADMIT
End: 2021-05-12
Attending: OBSTETRICS & GYNECOLOGY | Admitting: OBSTETRICS & GYNECOLOGY
Payer: COMMERCIAL

## 2021-05-12 DIAGNOSIS — O34.219 HISTORY OF CESAREAN SECTION COMPLICATING PREGNANCY: ICD-10-CM

## 2021-05-12 DIAGNOSIS — O09.523 AMA (ADVANCED MATERNAL AGE) MULTIGRAVIDA 35+, THIRD TRIMESTER: ICD-10-CM

## 2021-05-12 NOTE — TELEPHONE ENCOUNTER
Type of surgery: RPT CS  Location of surgery: [facility]  Date and time of surgery: 7/9/2021 11a  Surgeon: EDI  Pre-Op Appt Date: HOSPITAL  Post-Op Appt Date: TBD   Packet sent out: MAILED 5/12/2021  Pre-cert/Authorization completed:  TBD  Date: 5/12/2021    Dionne Steele  Surgery Scheduler

## 2021-05-12 NOTE — TELEPHONE ENCOUNTER
Additional Instructions for the Case  ERAS BAG GIVEN No  ERAS INSTRUCTIONS EXPLAINED No    ASSIST: non    H&P TO BE COMPLETED BY:   Surgeon      EQUIPMENT: no  VENDOR NEEDED AT CASE: no  IF IUD WHAT BRAND na  LABS/SPECIAL INSTRUCTIONS: na  TIME OFF WORK: 8 weeks

## 2021-05-25 ENCOUNTER — PRENATAL OFFICE VISIT (OUTPATIENT)
Dept: OBGYN | Facility: CLINIC | Age: 41
End: 2021-05-25
Payer: COMMERCIAL

## 2021-05-25 VITALS — DIASTOLIC BLOOD PRESSURE: 56 MMHG | BODY MASS INDEX: 31.55 KG/M2 | WEIGHT: 189.6 LBS | SYSTOLIC BLOOD PRESSURE: 102 MMHG

## 2021-05-25 DIAGNOSIS — O09.522 AMA (ADVANCED MATERNAL AGE) MULTIGRAVIDA 35+, SECOND TRIMESTER: ICD-10-CM

## 2021-05-25 DIAGNOSIS — O09.91 SUPERVISION OF HIGH RISK PREGNANCY IN FIRST TRIMESTER: ICD-10-CM

## 2021-05-25 DIAGNOSIS — O09.523 AMA (ADVANCED MATERNAL AGE) MULTIGRAVIDA 35+, THIRD TRIMESTER: Primary | ICD-10-CM

## 2021-05-25 PROCEDURE — 99207 PR PRENATAL VISIT: CPT | Performed by: OBSTETRICS & GYNECOLOGY

## 2021-05-28 NOTE — TELEPHONE ENCOUNTER
5 weeks pregnant: starting to have vaginal bleeding. At first it was mucousy and dark burgundy. Now it is similar to a period. She has had some stomach cramping and thinks it is related to what she ate earlier today. Sharp stabbing pain. Now gone. Blood tests confirmed pregnancy in clinic.    Reason for Disposition    MODERATE vaginal bleeding (i.e., soaking 1 pad / hour; clots)    MILD vaginal bleeding (i.e., less than 1 pad / hour; less than patient's usual menstrual bleeding; not just spotting)    Protocols used: PREGNANCY - VAGINAL BLEEDING LESS THAN 20 WEEKS EGA-[facility].   Advised to call oncall provider now. If bleeding increases then she will go to ER.     Aye Parra RN [facility] Triage Nurse

## 2021-06-01 ENCOUNTER — PRENATAL OFFICE VISIT (OUTPATIENT)
Dept: OBGYN | Facility: CLINIC | Age: 41
End: 2021-06-01
Attending: OBSTETRICS & GYNECOLOGY
Payer: COMMERCIAL

## 2021-06-01 ENCOUNTER — ANCILLARY PROCEDURE (OUTPATIENT)
Dept: ULTRASOUND IMAGING | Facility: CLINIC | Age: 41
End: 2021-06-01
Attending: OBSTETRICS & GYNECOLOGY
Payer: COMMERCIAL

## 2021-06-01 VITALS — BODY MASS INDEX: 32.18 KG/M2 | DIASTOLIC BLOOD PRESSURE: 58 MMHG | SYSTOLIC BLOOD PRESSURE: 112 MMHG | WEIGHT: 193.4 LBS

## 2021-06-01 DIAGNOSIS — O34.219 HISTORY OF CESAREAN SECTION COMPLICATING PREGNANCY: ICD-10-CM

## 2021-06-01 DIAGNOSIS — O09.523 AMA (ADVANCED MATERNAL AGE) MULTIGRAVIDA 35+, THIRD TRIMESTER: Primary | ICD-10-CM

## 2021-06-01 DIAGNOSIS — O09.522 AMA (ADVANCED MATERNAL AGE) MULTIGRAVIDA 35+, SECOND TRIMESTER: ICD-10-CM

## 2021-06-01 DIAGNOSIS — O09.91 SUPERVISION OF HIGH RISK PREGNANCY IN FIRST TRIMESTER: ICD-10-CM

## 2021-06-01 PROCEDURE — 76819 FETAL BIOPHYS PROFIL W/O NST: CPT | Performed by: OBSTETRICS & GYNECOLOGY

## 2021-06-01 PROCEDURE — 99207 PR PRENATAL VISIT: CPT | Performed by: OBSTETRICS & GYNECOLOGY

## 2021-06-01 PROCEDURE — 76816 OB US FOLLOW-UP PER FETUS: CPT | Performed by: OBSTETRICS & GYNECOLOGY

## 2021-06-04 VITALS
RESPIRATION RATE: 14 BRPM | SYSTOLIC BLOOD PRESSURE: 104 MMHG | WEIGHT: 149 LBS | BODY MASS INDEX: 23.95 KG/M2 | HEART RATE: 77 BPM | HEIGHT: 66 IN | DIASTOLIC BLOOD PRESSURE: 70 MMHG

## 2021-06-04 VITALS — WEIGHT: 149 LBS | HEIGHT: 66 IN | BODY MASS INDEX: 23.95 KG/M2

## 2021-06-04 DIAGNOSIS — Z11.59 ENCOUNTER FOR SCREENING FOR OTHER VIRAL DISEASES: ICD-10-CM

## 2021-06-08 ENCOUNTER — ANCILLARY PROCEDURE (OUTPATIENT)
Dept: ULTRASOUND IMAGING | Facility: CLINIC | Age: 41
End: 2021-06-08
Attending: OBSTETRICS & GYNECOLOGY
Payer: COMMERCIAL

## 2021-06-08 ENCOUNTER — PRENATAL OFFICE VISIT (OUTPATIENT)
Dept: OBGYN | Facility: CLINIC | Age: 41
End: 2021-06-08
Attending: OBSTETRICS & GYNECOLOGY
Payer: COMMERCIAL

## 2021-06-08 VITALS — WEIGHT: 193 LBS | SYSTOLIC BLOOD PRESSURE: 98 MMHG | BODY MASS INDEX: 32.12 KG/M2 | DIASTOLIC BLOOD PRESSURE: 60 MMHG

## 2021-06-08 DIAGNOSIS — O09.523 AMA (ADVANCED MATERNAL AGE) MULTIGRAVIDA 35+, THIRD TRIMESTER: ICD-10-CM

## 2021-06-08 DIAGNOSIS — O09.91 SUPERVISION OF HIGH RISK PREGNANCY IN FIRST TRIMESTER: ICD-10-CM

## 2021-06-08 PROCEDURE — 76819 FETAL BIOPHYS PROFIL W/O NST: CPT | Performed by: OBSTETRICS & GYNECOLOGY

## 2021-06-08 PROCEDURE — 99207 PR PRENATAL VISIT: CPT | Performed by: OBSTETRICS & GYNECOLOGY

## 2021-06-08 NOTE — PROGRESS NOTES
4cm right ovarian cyst  Repeat c section 7/9  They are still deciding if they want bilateral salpingectomy  Will amend procedure next week for cyst and tubal after they decide  Good fm  BPP 8/8

## 2021-06-09 NOTE — PROGRESS NOTES
"An Roman is a 39 y.o. female who is being evaluated via a billable telephone visit.      The patient has been notified of following:     \"This telephone visit will be conducted via a call between you and your physician/provider. We have found that certain health care needs can be provided without the need for a physical exam.  This service lets us provide the care you need with a short phone conversation.  If a prescription is necessary we can send it directly to your pharmacy.  If lab work is needed we can place an order for that and you can then stop by our lab to have the test done at a later time.    Telephone visits are billed at different rates depending on your insurance coverage. During this emergency period, for some insurers they may be billed the same as an in-person visit.  Please reach out to your insurance provider with any questions.    If during the course of the call the physician/provider feels a telephone visit is not appropriate, you will not be charged for this service.\"    Patient has given verbal consent to a Telephone visit? Yes    What phone number would you like to be contacted at? [phone]    Patient would like to receive their AVS by AVS Preference: Jeanna.    Additional provider notes: recently had a miscarriage; also had right side numbness and heart palpitations     Had an MRI. They found \"a spot\" on her brain. They believe this is what her numbness is from.  She had menstrual spotting and had had a previous miscarriage, so she knows what to expect from that.    She continues to have problems with heart palpitations and she does not feel comfortable waiting till next week to have that checked out. She's really limiting her activity due to this, and she's usually a very active person.  It feels like she skips a beat. If she checks her pulse, the palpitation is very short and then it goes back to normal. She goes 30-60 min without it and then it happens again. Day and night " it occurs.  She tried walking and cleaning the house and she gets more palpitations.  Less energy since this is going on     Does not have a PCP. Summer home in [place], in winter she lives in [place]. Agrees she needs a PCP.    EKG from ER was told to her to be normal.    Assessment/Plan:  1. Palpitations  Persistent but not causing her to faint or fall; she wants it eval'd ASAP so she can be reassured she's OK or she gets the needed treatment. I agree it should be eval'd soon and I put in a referral - hoping she'll be seen yet this week (but no promises made).  - [facility]    2. Fatigue due to exposure, initial encounter  Labs from ER reviewed, no alarming findings.  - [facility]ulatory referral to [facility]        Phone call duration:  18 minutes  MD Sisi Littlejohn MA

## 2021-06-09 NOTE — PATIENT INSTRUCTIONS - HE
Ms. An Roman,  It certainly was nice to meet you today.  Per our conversation you're having some skipped beats in the chest.  This could represent an abnormal heartbeat and the reason I am checking the ultrasound of the heart and the heart monitor.  We will call you the results of these tests.   Donte Haynes

## 2021-06-09 NOTE — TELEPHONE ENCOUNTER
Patient calls today asking for follow-up appointment for her recent ED visit.    The patient was seen in [facility] ED on 20 for threatened , heart palpitations, and R side numbness/tingling.    At the ED her electrolytes were normal. Brain MRI showed small spot that was concerning for neurodegenerative disorder which was thought to be cause of numbness tingling. Patient recommended to follow up with neurology for this.    EKG done for heart palpitations was normal. Troponin negative. Patient recommended to follow-up with PCP about this. If feelings of heart palpitations continue it was recommended to get an echocardiogram. However this was not done in the hospital since patient was still pregnant at the time.    Patient states she went to OB yesterday who confirmed miscarriage. She is still having the intermittent heart palpitations and would like to set up follow-up office visit with provider. Scheduling unable to schedule patient with office visit this week as she is a new patient and does not have care established with provider. The protocol recommends that patient be seen in the next 3 days d/t symptoms of heart palpitations. Recommended to at least do telephone visit this afternoon to talk to provider about hospital follow-up and ongoing symptoms of heart palpitations. Provider can help patient determine the next step in plan of care and if further testing or referrals are needed at this time. Telephone visit this afternoon with Dr. Oquendo.    Nancy Roth RN      Reason for Disposition    Patient wants to be seen    Palpitations and no improvement after following Care Advice    Additional Information    Negative: Difficulty breathing    Negative: Dizziness, lightheadedness, or weakness    Negative: Heart beating very rapidly (e.g., > 140 / minute) and present now (EXCEPTION: during exercise)    Negative: Heart beating very slowly (e.g., < 50 / minute) (EXCEPTION: athlete)    Negative: New or  "worsened shortness of breath with activity (dyspnea on exertion)    Negative: Patient sounds very sick or weak to the triager    Negative: Wearing a \"holter monitor\" or \"cardiac event monitor\"    Negative: Received SHOCK from implantable cardiac defibrillator (and now feels well)    Negative: Taking water pill (i.e., diuretic) or heart medication (e.g., digoxin)    Negative: Age > 60 years    Negative: History of heart disease (i.e., heart attack, bypass surgery, angina, angioplasty)    Negative: Skipped or extra beat(s) and occurs 4 or more times per minute    Negative: Skipped or extra beat(s) and increases with exercise or exertion    Negative: Heart beating very rapidly (e.g., > 140 / minute) and not present now (EXCEPTION: during exercise)    Negative: Known or suspected substance abuse (e.g., cocaine, alcohol abuse)    Negative: History of hyperthyroidism or taking thyroid medication    Protocols used: HEART RATE AND HEARTBEAT [id number]      "

## 2021-06-09 NOTE — TELEPHONE ENCOUNTER
New Appointment Needed  What is the reason for the visit:  New patient - INF - heart palpitations, miscarriage, EKG and MRI - [facility] - 7/4 thru 7/5 - within 7 to 10 days and still having symptoms - would like to establish care with a provider  Inpatient/ED Follow Up Appt Request  At what hospital or facility were you seen?: [facility]  What is the reason you were seen?: heart palpitations was pregnant, miscarriage, did an EKG MRI  What date were you admitted?: date: 7/4/20  What date were you discharged?: date: 7/5/20  What was the recommended timeframe for your follow up appointment?: within 7 to 10 unless still not improving, patient stated they were still having heart issues and was transferred to a nurse.  Provider Preference: Any available  How soon do you need to be seen?: as soon as possible  Waitlist offered?: No  Okay to leave a detailed message:  Yes

## 2021-06-09 NOTE — TELEPHONE ENCOUNTER
Wellness Screening Tool  Symptom Screening:  Do you have one of the following NEW symptoms:    Fever (subjective or >100.0)?  No    A new cough?  No    Shortness of breath?  No     Chills? No     New loss of taste or smell? No     Generalized body aches? No     New persistent headache? No     New sore throat? No     Nausea, vomiting, or diarrhea?  No    Within the past 3 weeks, have you been exposed to someone with a known positive illness below:    COVID-19 (known or suspected)?  No    Chicken pox?  No    Mealses?  No    Pertussis?  No    Patient notified of visitor policy- They may have one person accompany them to their appointment, but they will need to wear a mask and will be screened upon arrival for symptoms: Yes  Pt informed to wear a mask: Yes  Pt notified if they develop any symptoms listed above, prior to their appointment, they are to call the clinic directly at [phone] for further instructions.  Yes  Patient's appointment status: Patient will be seen in clinic as scheduled on 1:50 pm with LBF 7/9

## 2021-06-15 ENCOUNTER — PRENATAL OFFICE VISIT (OUTPATIENT)
Dept: OBGYN | Facility: CLINIC | Age: 41
End: 2021-06-15
Attending: OBSTETRICS & GYNECOLOGY

## 2021-06-15 VITALS — WEIGHT: 195.8 LBS | SYSTOLIC BLOOD PRESSURE: 110 MMHG | BODY MASS INDEX: 32.58 KG/M2 | DIASTOLIC BLOOD PRESSURE: 54 MMHG

## 2021-06-15 DIAGNOSIS — O09.91 SUPERVISION OF HIGH RISK PREGNANCY IN FIRST TRIMESTER: ICD-10-CM

## 2021-06-15 PROCEDURE — 99207 PR PRENATAL VISIT: CPT | Performed by: OBSTETRICS & GYNECOLOGY

## 2021-06-20 NOTE — LETTER
Letter by Mahi Kessler, RN at      Author: Mahi Kessler RN Service: -- Author Type: --    Filed:  Encounter Date: 7/17/2020 Status: (Other)         An Roman  [address] [facility] [address]             July 17, 2020         Dear Ms. Roman,    Below are the results from your recent visit:    Resulted Orders   Echo Complete   Result Value Ref Range    LV volume diastolic 61 46 - 106 cm3    LV volume systolic 21 14 - 42 cm3    HR 57 bpm    IVSd 0.8 0.6 - 0.9 cm    LVIDd 4.6 3.8 - 5.2 cm    LVIDs 3.2 2.2 - 3.5 cm    LVOT diam 2 cm    LVOT mean gradient 1 mmHg    LVOT peak VTI 14.9 cm    LVOT mean scot 53.1 cm/s    LVOT peak scot 75.9 cm/s    LVOT peak gradient 2 mmHg    LV PWd 0.8 0.6 - 0.9 cm    MV E' lat scot 19.2 cm/s    MV E' med scot 11.9 cm/s    AV mean scot 71.2 cm/s    AV mean gradient 2 mmHg    AV VTI 20.5 cm    AV peak scot 97.1 cm/s    AO root 2.7 cm    AO ascending 2.4 cm    LA size 3.2 cm    MV decel time 225 ms    MV peak A scot 31.6 cm/s    MV peak E scot 82.7 cm/s    LA area 2 17.6 cm2    LA area 1 13.9 cm2    LA length 4.89 cm    TAPSE 1.7 cm    BSA 1.77 m2    Hieght 65.5 in    Weight 2,384 lbs    /64 mmHg    IVS/PW ratio 1.0     LV FS 30.4 28 - 44 %    Echo LVEF calculated 66 55 - 75 %    LV mass 117.9 g    AV area 2.3 cm2    LVOT area 3.14 cm2    LVOT SV 46.8 cm3    LV systolic volume index 11.9 8 - 24 cm3/m2    LV diastolic volume index 34.5 29 - 61 cm3/m2    LV mass index 66.6 g/m2    LV SVi 26.4 ml/m2    LV CO 2.7 l/min    LV Ci 1.5 l/min/m2    Height 65.5 in    Weight 149 lbs    AV DIM IND VTI 0.7     LA volume 42.5 mL    AV DIM IND scot 0.8     MV E/A Ratio 2.6     AV peak gradient 3.8 mmHg    LA volume index 24.0 mL/m2    MV med E/e' ratio 6.9     MV lat E/e' ratio 4.3     MV Avg E/e' Ratio 5.3 cm/s    Narrative      Left ventricle ejection fraction is normal. The calculated left   ventricular ejection fraction is 66%.    Normal left ventricular size and systolic  function.    Normal right ventricular size and systolic function.    No hemodynamically significant valvular heart abnormalities.    No previous study for comparison.        The test results show that your echocardiogram was normal. We will await the results of the event monitor.       ----- Message -----  From: Naye Haynes MD  Sent: 7/16/2020   5:39 PM CDT  To: Mahi Kessler RN    Let her know echo is normal and await monitor results.  No structural heart disease.  LF      Please call with questions or contact us using [employer].    Sincerely,    Mahi WEST

## 2021-06-20 NOTE — LETTER
Letter by Mahi Kessler RN at      Author: Mahi Kessler RN Service: -- Author Type: --    Filed:  Encounter Date: 8/14/2020 Status: (Other)         An Roman  [address] [facility] [address]             August 14, 2020         Dear Ms. Roman,    Below are the results from your recent visit:    Resulted Orders   ROMMEL Monitor Hook-Up    Narrative    TEST INDICATION:  Evaluate palpitations.      IMPRESSION:  1.  A 21-day monitor was done from 07/16/2020 through 08/05/2020.  2.  Baseline transmission showed sinus rhythm with normal   electrocardiographic  intervals.  3.  Average heart rate of 77 beats per minute with heart rate ranged   between 50 to  128 beats per minute.  4.  Patient had 28 symptomatic events for skipped beats.  Most of the time   there  were single PACs or an atrial couplet.  Sometimes sinus tachycardia was   present but  over half the time the skipped beat sensation correlated to singular PACs.  5.  Rare noncomplex ventricular ectopy.  6.  No bradycardia or pauses.    DISCUSSION:  Skipped beats correlate to singular PACs or 1 couplet.  No   complex  atrial ectopy, atrial tachycardia or atrial fibrillation.    Otherwise, normal multi-day monitor.      AMY KAPLAN MD  sn  D 08/10/2020 13:19:28  T 08/10/2020 13:27:31  R 08/10/2020 13:35:20  [id number]      cc: ROZINA HAYNES MD        The test results show that your event monitor was stable. Skipped beats that were reported correlated with benign, singular extra beats called PAC's or premature atrial contractions. He would not treat them with a medication at this time due to infrequency. If they become bothersome or you feel dizzy, lightehaded, have chest pain or shortness of breath, please call to set up a follow up appointment. Here is his message:  Naye Haynes MD                Let her know echo looks normal and monitor just rare pac's. No sig heart disease and extra beats probably secondary to non cardiac issues.  Can use beta blockers but suggest not since so infrequent.   LF        Please call with questions or contact us using [employer].    Sincerely,    Mahi WEST  [phone]

## 2021-06-22 ENCOUNTER — ANCILLARY PROCEDURE (OUTPATIENT)
Dept: ULTRASOUND IMAGING | Facility: CLINIC | Age: 41
End: 2021-06-22
Attending: OBSTETRICS & GYNECOLOGY
Payer: COMMERCIAL

## 2021-06-22 ENCOUNTER — PRENATAL OFFICE VISIT (OUTPATIENT)
Dept: OBGYN | Facility: CLINIC | Age: 41
End: 2021-06-22
Attending: OBSTETRICS & GYNECOLOGY
Payer: COMMERCIAL

## 2021-06-22 ENCOUNTER — PREP FOR PROCEDURE (OUTPATIENT)
Dept: OBGYN | Facility: CLINIC | Age: 41
End: 2021-06-22

## 2021-06-22 VITALS — DIASTOLIC BLOOD PRESSURE: 64 MMHG | BODY MASS INDEX: 33.28 KG/M2 | WEIGHT: 200 LBS | SYSTOLIC BLOOD PRESSURE: 90 MMHG

## 2021-06-22 DIAGNOSIS — O09.91 SUPERVISION OF HIGH RISK PREGNANCY IN FIRST TRIMESTER: ICD-10-CM

## 2021-06-22 DIAGNOSIS — O34.219 HISTORY OF CESAREAN SECTION COMPLICATING PREGNANCY: ICD-10-CM

## 2021-06-22 DIAGNOSIS — O09.522 AMA (ADVANCED MATERNAL AGE) MULTIGRAVIDA 35+, SECOND TRIMESTER: ICD-10-CM

## 2021-06-22 DIAGNOSIS — Z36.85 SCREENING, ANTENATAL, FOR STREPTOCOCCUS B: Primary | ICD-10-CM

## 2021-06-22 PROCEDURE — 87653 STREP B DNA AMP PROBE: CPT | Performed by: OBSTETRICS & GYNECOLOGY

## 2021-06-22 PROCEDURE — 99207 PR PRENATAL VISIT: CPT | Performed by: OBSTETRICS & GYNECOLOGY

## 2021-06-22 PROCEDURE — 76819 FETAL BIOPHYS PROFIL W/O NST: CPT | Performed by: OBSTETRICS & GYNECOLOGY

## 2021-06-22 NOTE — TELEPHONE ENCOUNTER
Spoke w/Erin at [facility] to add the below    Dionne Steele  Surgery Scheduler

## 2021-06-23 LAB
GP B STREP DNA SPEC QL NAA+PROBE: NEGATIVE
SPECIMEN SOURCE: NORMAL

## 2021-06-25 ENCOUNTER — NURSE TRIAGE (OUTPATIENT)
Dept: NURSING | Facility: CLINIC | Age: 41
End: 2021-06-25

## 2021-06-26 NOTE — TELEPHONE ENCOUNTER
Patient called earlier and was advised to be seen at Labor and Delivery for contractions. Patient states as she was preparing to leave for Labor and Delivery her contractions have stopped. States she called Labor and Delivery at [facility] and informed them she will not be coming. Requesting on call provider to be paged.     Paged on call provider PREETI THORNE MD for [facility] via smart web to call RN directly at [phone].    Dr. Thorne called and advised home care disposition. Advised to be seen at Labor and Delivery if contractions come back. RN called patient and advised provider's recommendation.     Caller verbalized understanding. Denies further questions.      Say Sanders RN  [facility] Nurse Advisors     COVID 19 Nurse Triage Plan/Patient Instructions    Please be aware that novel coronavirus (COVID-19) may be circulating in the community. If you develop symptoms such as fever, cough, or SOB or if you have concerns about the presence of another infection including coronavirus (COVID-19), please contact your health care provider or visit [email or web address].     Disposition/Instructions    Home care recommended. Follow home care protocol based instructions.    Thank you for taking steps to prevent the spread of this virus.  o Limit your contact with others.  o Wear a simple mask to cover your cough.  o Wash your hands well and often.    Resources    [facility]: About COVID-19: [email or web address]    CDC: What to Do If You're Sick: www.cdc.gov/coronavirus/2019-ncov/about/steps-when-sick.html    CDC: Ending Home Isolation: www.cdc.gov/coronavirus/2019-ncov/hcp/disposition-in-home-patients.html     CDC: Caring for Someone: www.cdc.gov/coronavirus/2019-ncov/if-you-are-sick/care-for-someone.html     MD: Interim Guidance for Hospital Discharge to Home: www.health.[facility].[place]/diseases/coronavirus/hcp/hospdischarge.pdf    [facility]  [place] clinical trials (COVID-19 research studies): [email or web address]-[email or web address]-trials     Below are the COVID-19 hotlines at the [place] Department of Health ([facility]). Interpreters are available.   o For health questions: Call 010-282-1608 or 1-758.536.8114 (7 a.m. to 7 p.m.)  o For questions about schools and childcare: Call 586-221-5833 or 1-646.260.4724 (7 a.m. to 7 p.m.)                         Additional Information    [1] Mild contractions AND [2] > 10 minutes apart    Protocols used: PREGNANCY - LABOR - [id number]

## 2021-06-26 NOTE — TELEPHONE ENCOUNTER
OB Triage Call    Reason for call: Labor    Assessment: Patient calling reporting having contractions for the past 1.5 hours. States she had more than 7 contractions in the last hour with the last several contractions being less than 10 minutes apart. Denies vaginal bleeding or vaginal fluid leakage. Denies feeling the need to push.     Plan: Advised patient to be seen at Labor and Deliver.     Patient plans to deliver at [facility]  Patient's primary OB Provider is Dr. Lozano.    Is patient's primary OB from [place]? No- Patient's primary OB provider is a Physician.  Labor and delivery at [facility] ( [phone]) notified of patient's pending arrival.  Report given to January .      36w5d  Estimated Date of Delivery: 2021        OB History    Para Term  AB Living   4 1 1 0 2 1   SAB TAB Ectopic Multiple Live Births   2 0 0 0 1      # Outcome Date GA Lbr Benito/2nd Weight Sex Delivery Anes PTL Lv   4 Current            3 SAB 20 9w4d          2 2019           1 Term 17 39w3d  3.25 kg (7 lb 2.6 oz) M   N CHASE      Name: Alec      Apgar1: 8  Apgar5: 9       Lab Results   Component Value Date    GBS Negative 2021          Say Sanders RN 21 9:17 PM  [facility] Nurse Advisor  Reason for Disposition    Contractions < 10 minutes apart for 1 hour (i.e., 6 or more contractions an hour)    Additional Information    Negative: Passed out (i.e., lost consciousness, collapsed and was not responding)    Negative: Shock suspected (e.g., cold/pale/clammy skin, too weak to stand, low BP, rapid pulse)    Negative: Difficult to awaken or acting confused (e.g., disoriented, slurred speech)    Negative: [1] SEVERE abdominal pain (e.g., excruciating) AND [2] constant AND [3] present > 1 hour    Negative: Severe bleeding (e.g., continuous red blood from vagina, or large blood clots)    Negative: Umbilical cord hanging out of the vagina (shiny, white, curled appearance,  "\"like telephone cord\")    Negative: Uncontrollable urge to push (i.e., feels like baby is coming out now)    Negative: Can see baby    Negative: Sounds like a life-threatening emergency to the triager    Negative: MODERATE-SEVERE abdominal pain    Protocols used: PREGNANCY - LABOR - [id number]      "

## 2021-06-29 ENCOUNTER — ANCILLARY PROCEDURE (OUTPATIENT)
Dept: ULTRASOUND IMAGING | Facility: CLINIC | Age: 41
End: 2021-06-29
Attending: OBSTETRICS & GYNECOLOGY
Payer: COMMERCIAL

## 2021-06-29 ENCOUNTER — PRENATAL OFFICE VISIT (OUTPATIENT)
Dept: OBGYN | Facility: CLINIC | Age: 41
End: 2021-06-29
Attending: OBSTETRICS & GYNECOLOGY
Payer: COMMERCIAL

## 2021-06-29 VITALS — DIASTOLIC BLOOD PRESSURE: 78 MMHG | BODY MASS INDEX: 33.51 KG/M2 | SYSTOLIC BLOOD PRESSURE: 106 MMHG | WEIGHT: 201.4 LBS

## 2021-06-29 DIAGNOSIS — O09.522 AMA (ADVANCED MATERNAL AGE) MULTIGRAVIDA 35+, SECOND TRIMESTER: ICD-10-CM

## 2021-06-29 DIAGNOSIS — O09.91 SUPERVISION OF HIGH RISK PREGNANCY IN FIRST TRIMESTER: ICD-10-CM

## 2021-06-29 DIAGNOSIS — O09.523 AMA (ADVANCED MATERNAL AGE) MULTIGRAVIDA 35+, THIRD TRIMESTER: Primary | ICD-10-CM

## 2021-06-29 DIAGNOSIS — O34.219 HISTORY OF CESAREAN SECTION COMPLICATING PREGNANCY: ICD-10-CM

## 2021-06-29 PROCEDURE — 76819 FETAL BIOPHYS PROFIL W/O NST: CPT | Performed by: OBSTETRICS & GYNECOLOGY

## 2021-06-29 PROCEDURE — 99207 PR PRENATAL VISIT: CPT | Performed by: OBSTETRICS & GYNECOLOGY

## 2021-06-29 PROCEDURE — 76816 OB US FOLLOW-UP PER FETUS: CPT | Performed by: OBSTETRICS & GYNECOLOGY

## 2021-06-29 NOTE — PROGRESS NOTES
Progress Notes by Naye Haynes MD at 2020  1:50 PM     Author: Naye Haynes MD Service: -- Author Type: Physician    Filed: 2020  3:02 PM Encounter Date: 2020 Status: Signed    : Naye Haynes MD (Physician)         [facility] Care Office Consult     Assessment:     1. Palpitations -sound like innocent PACs or PVCs.  Discussed with her potential etiology.  Not taking any supplements nor any caffeinated products or alcohol.  We will arrange for 21-day ACT monitor as well as echo and address if abnormalities found.  Suspect related to miscarriage.   2. Numbness and tingling of right arm and leg -felt to be secondary to CNS sclerotic lesion and defer to neurology.   3. Threatened  -2 miscarriages and defer miscarriage work-up to OB/GYN.  Wonder if there is some sort of embolic phenomenon and will look for structural heart disease with echo.      Plan:   1.  Echocardiogram looking for structural heart disease and address if found.  2.  21 day ACT monitor looking for arrhythmias and discuss if found.  3.  Cardiology follow-up and beta-blocker only if above abnormal.    History of Present Illness:   Thank you for asking the [facility] Care team to see An Roman a 39 y.o.  female  in consultation  to evaluate palpitations.  Patient was in her usual state of health till around  when she started having skipping heartbeats.  She describes them as a.  Will her heart will race for a few seconds and she will feel in her chest and then maybe stop and repeat in an hour or 2.  There is no other significant chest discomfort,  shortness of breath, PND, orthopnea or peripheral edema.  Following this, she started having some spotting, she was 9 weeks pregnant, presented to the emergency department with the palpitations and spotting and ultrasound showed nonviable fetus.  She has since been passing portions and potentially placenta but the palpitations have  "persisted.    Past Medical History:   History reviewed. No pertinent past medical history.    Past history is negative for cancer, tuberculosis, diabetes mellitus, myocardial infarction, rheumatic fever, hypertension, cerebrovascular accident, chronic kidney disease, peptic ulcer disease, chronic obstructive pulmonary disease, or thyroid disorder  and no lipid disorder.    Past Surgical History:   History reviewed. No pertinent surgical history.    Family History:   Family history negative for coronary artery disease.    Social History:   She lives at home independently with her  and 2-1/2-year-old son, works doing real estate, used to smoke less than a pack a day for about 20 years, reports that she has quit smoking. She uses smokeless tobacco. She reports previous alcohol use. She reports that she does not use drugs. The primary care physician is Reina Oquendo MD    Meds:   Scheduled Meds:  Current Outpatient Medications   Medication Sig Dispense Refill   ? PNV no.95/ferrous fum/folic ac (PRENATAL MULTIVITAMINS ORAL) Take 1 tablet by mouth.       No current facility-administered medications for this visit.        PRN Meds:.    Allergies:   Patient has no known allergies.    Objective:      Physical Exam  149 lb (67.6 kg)  5' 5.5\" (1.664 m)  Body mass index is 24.42 kg/m .  /70 (Patient Site: Left Arm, Patient Position: Sitting, Cuff Size: Adult Large)   Pulse 77   Resp 14   Ht 5' 5.5\" (1.664 m)   Wt 149 lb (67.6 kg)   BMI 24.42 kg/m      General Appearance:   Alert, cooperative and in no acute distress.   HEENT:  No scleral icterus; the mucous membranes were pink and moist.   Neck: JVP normal. No thyromegaly. No HJR   Chest: The spine was straight. The chest was symmetric.   Lungs:   Respirations unlabored; the lungs are clear to auscultation.   Cardiovascular:   S1 and S2 without murmur, clicks or rubs. Brachial, radial, carotid and posterior tibial pulses are intact and symetrical.  No " carotid bruits noted   Abdomen:  No organomegaly, masses, bruits, or tenderness. Bowels sounds are present   Extremities: No cyanosis, clubbing, or edema.   Skin: No xanthelasma.   Neurologic: Mood and affect are appropriate.         Lab Reviewed Personally by myself  Lab Results   Component Value Date     07/04/2020    K 4.0 07/04/2020     07/04/2020    CO2 23 07/04/2020    BUN 13 07/04/2020    CREATININE 0.76 07/04/2020    CALCIUM 9.1 07/04/2020     Lab Results   Component Value Date    WBC 8.0 07/04/2020    HGB 11.7 (L) 07/04/2020    HCT 34.9 (L) 07/04/2020    MCV 91 07/04/2020     07/04/2020     No results found for: CHOL, TRIG, HDL, LDLDIRECT  No results found for: BNP    ECG personally reviewed by myself shows normal sinus rhythm within normal limits, no Brugada, no QT prolongation, no WPW.         Review of Systems:     Review of Systems:   General: WNL  Eyes: WNL  Ears/Nose/Throat: WNL  Lungs: WNL  Heart: Irregular Heartbeat  Stomach: WNL  Bladder: WNL  Muscle/Joints: WNL  Skin: WNL  Nervous System: WNL  Mental Health: WNL     Blood: WNL

## 2021-06-29 NOTE — PROGRESS NOTES
bpp 8/8  Normal fluid  Good fm  bp normal  c section on July 9 repeat   Growth normal today  Cervix closed/th/high  Has eras bag  One more bpp

## 2021-07-05 DIAGNOSIS — Z11.59 ENCOUNTER FOR SCREENING FOR OTHER VIRAL DISEASES: ICD-10-CM

## 2021-07-05 LAB
LABORATORY COMMENT REPORT: NORMAL
SARS-COV-2 RNA RESP QL NAA+PROBE: NEGATIVE
SARS-COV-2 RNA RESP QL NAA+PROBE: NORMAL
SPECIMEN SOURCE: NORMAL
SPECIMEN SOURCE: NORMAL

## 2021-07-05 PROCEDURE — U0003 INFECTIOUS AGENT DETECTION BY NUCLEIC ACID (DNA OR RNA); SEVERE ACUTE RESPIRATORY SYNDROME CORONAVIRUS 2 (SARS-COV-2) (CORONAVIRUS DISEASE [COVID-19]), AMPLIFIED PROBE TECHNIQUE, MAKING USE OF HIGH THROUGHPUT TECHNOLOGIES AS DESCRIBED BY CMS-2020-01-R: HCPCS | Performed by: OBSTETRICS & GYNECOLOGY

## 2021-07-05 PROCEDURE — U0005 INFEC AGEN DETEC AMPLI PROBE: HCPCS | Performed by: OBSTETRICS & GYNECOLOGY

## 2021-07-06 ENCOUNTER — ANCILLARY PROCEDURE (OUTPATIENT)
Dept: ULTRASOUND IMAGING | Facility: CLINIC | Age: 41
End: 2021-07-06
Payer: COMMERCIAL

## 2021-07-06 ENCOUNTER — PRENATAL OFFICE VISIT (OUTPATIENT)
Dept: OBGYN | Facility: CLINIC | Age: 41
End: 2021-07-06
Payer: COMMERCIAL

## 2021-07-06 VITALS — DIASTOLIC BLOOD PRESSURE: 70 MMHG | WEIGHT: 201.8 LBS | SYSTOLIC BLOOD PRESSURE: 112 MMHG | BODY MASS INDEX: 33.58 KG/M2

## 2021-07-06 DIAGNOSIS — O34.219 HISTORY OF CESAREAN SECTION COMPLICATING PREGNANCY: ICD-10-CM

## 2021-07-06 DIAGNOSIS — O09.91 SUPERVISION OF HIGH RISK PREGNANCY IN FIRST TRIMESTER: ICD-10-CM

## 2021-07-06 DIAGNOSIS — O09.523 AMA (ADVANCED MATERNAL AGE) MULTIGRAVIDA 35+, THIRD TRIMESTER: ICD-10-CM

## 2021-07-06 DIAGNOSIS — O09.523 AMA (ADVANCED MATERNAL AGE) MULTIGRAVIDA 35+, THIRD TRIMESTER: Primary | ICD-10-CM

## 2021-07-06 PROCEDURE — 99207 PR PRENATAL VISIT: CPT | Performed by: OBSTETRICS & GYNECOLOGY

## 2021-07-06 PROCEDURE — 76819 FETAL BIOPHYS PROFIL W/O NST: CPT | Performed by: OBSTETRICS & GYNECOLOGY

## 2021-07-08 ENCOUNTER — ANESTHESIA EVENT (OUTPATIENT)
Dept: OBGYN | Facility: CLINIC | Age: 41
End: 2021-07-08
Payer: COMMERCIAL

## 2021-07-08 RX ORDER — CEFAZOLIN SODIUM 1 G/3ML
1 INJECTION, POWDER, FOR SOLUTION INTRAMUSCULAR; INTRAVENOUS SEE ADMIN INSTRUCTIONS
Status: CANCELLED | OUTPATIENT
Start: 2021-07-08

## 2021-07-08 RX ORDER — CITRIC ACID/SODIUM CITRATE 334-500MG
30 SOLUTION, ORAL ORAL
Status: CANCELLED | OUTPATIENT
Start: 2021-07-08

## 2021-07-08 RX ORDER — LIDOCAINE 40 MG/G
CREAM TOPICAL
Status: CANCELLED | OUTPATIENT
Start: 2021-07-08

## 2021-07-08 RX ORDER — ACETAMINOPHEN 325 MG/1
975 TABLET ORAL ONCE
Status: CANCELLED | OUTPATIENT
Start: 2021-07-08

## 2021-07-08 RX ORDER — SODIUM CHLORIDE, SODIUM LACTATE, POTASSIUM CHLORIDE, CALCIUM CHLORIDE 600; 310; 30; 20 MG/100ML; MG/100ML; MG/100ML; MG/100ML
INJECTION, SOLUTION INTRAVENOUS CONTINUOUS
Status: CANCELLED | OUTPATIENT
Start: 2021-07-08

## 2021-07-08 RX ORDER — CEFAZOLIN SODIUM 2 G/100ML
2 INJECTION, SOLUTION INTRAVENOUS
Status: CANCELLED | OUTPATIENT
Start: 2021-07-08

## 2021-07-09 ENCOUNTER — ANESTHESIA (OUTPATIENT)
Dept: OBGYN | Facility: CLINIC | Age: 41
End: 2021-07-09
Payer: COMMERCIAL

## 2021-07-09 ENCOUNTER — HOSPITAL ENCOUNTER (INPATIENT)
Facility: CLINIC | Age: 41
LOS: 3 days | Discharge: HOME | End: 2021-07-12
Attending: OBSTETRICS & GYNECOLOGY | Admitting: OBSTETRICS & GYNECOLOGY
Payer: COMMERCIAL

## 2021-07-09 DIAGNOSIS — O09.523 AMA (ADVANCED MATERNAL AGE) MULTIGRAVIDA 35+, THIRD TRIMESTER: ICD-10-CM

## 2021-07-09 DIAGNOSIS — O09.91 SUPERVISION OF HIGH RISK PREGNANCY IN FIRST TRIMESTER: Primary | ICD-10-CM

## 2021-07-09 DIAGNOSIS — O34.219 HISTORY OF CESAREAN SECTION COMPLICATING PREGNANCY: ICD-10-CM

## 2021-07-09 LAB
ABO + RH BLD: ABNORMAL
ABO + RH BLD: ABNORMAL
AMPHETAMINES UR QL SCN: NEGATIVE
BLD GP AB INVEST PLASRBC-IMP: ABNORMAL
BLD GP AB SCN SERPL QL: ABNORMAL
BLOOD BANK CMNT PATIENT-IMP: ABNORMAL
BLOOD BANK CMNT PATIENT-IMP: NORMAL
BLOOD BANK CMNT PATIENT-IMP: NORMAL
CANNABINOIDS UR QL: NEGATIVE
COCAINE UR QL: NEGATIVE
ERYTHROCYTE [DISTWIDTH] IN BLOOD BY AUTOMATED COUNT: 13.3 % (ref 10–15)
HCT VFR BLD AUTO: 32 % (ref 35–47)
HGB BLD-MCNC: 10.5 G/DL (ref 11.7–15.7)
MCH RBC QN AUTO: 29.8 PG (ref 26.5–33)
MCHC RBC AUTO-ENTMCNC: 32.8 G/DL (ref 31.5–36.5)
MCV RBC AUTO: 91 FL (ref 78–100)
OPIATES UR QL SCN: NEGATIVE
PCP UR QL SCN: NEGATIVE
PLATELET # BLD AUTO: 324 10E9/L (ref 150–450)
RBC # BLD AUTO: 3.52 10E12/L (ref 3.8–5.2)
SPECIMEN EXP DATE BLD: ABNORMAL
T PALLIDUM AB SER QL: NONREACTIVE
WBC # BLD AUTO: 10.6 10E9/L (ref 4–11)

## 2021-07-09 PROCEDURE — 250N000009 HC RX 250

## 2021-07-09 PROCEDURE — 120N000012 HC R&B POSTPARTUM

## 2021-07-09 PROCEDURE — 370N000017 HC ANESTHESIA TECHNICAL FEE, PER MIN: Performed by: OBSTETRICS & GYNECOLOGY

## 2021-07-09 PROCEDURE — 710N000010 HC RECOVERY PHASE 1, LEVEL 2, PER MIN: Performed by: OBSTETRICS & GYNECOLOGY

## 2021-07-09 PROCEDURE — 258N000003 HC RX IP 258 OP 636: Performed by: OBSTETRICS & GYNECOLOGY

## 2021-07-09 PROCEDURE — 88304 TISSUE EXAM BY PATHOLOGIST: CPT | Mod: 26 | Performed by: PATHOLOGY

## 2021-07-09 PROCEDURE — 250N000011 HC RX IP 250 OP 636: Performed by: OBSTETRICS & GYNECOLOGY

## 2021-07-09 PROCEDURE — 86870 RBC ANTIBODY IDENTIFICATION: CPT | Performed by: OBSTETRICS & GYNECOLOGY

## 2021-07-09 PROCEDURE — 250N000009 HC RX 250: Performed by: OBSTETRICS & GYNECOLOGY

## 2021-07-09 PROCEDURE — 49203 PR EXCISION/DESTRUCTION OPEN ABDOMINAL TUMORS 5 CM: CPT | Mod: 51 | Performed by: OBSTETRICS & GYNECOLOGY

## 2021-07-09 PROCEDURE — 86780 TREPONEMA PALLIDUM: CPT | Performed by: OBSTETRICS & GYNECOLOGY

## 2021-07-09 PROCEDURE — 86900 BLOOD TYPING SEROLOGIC ABO: CPT | Performed by: OBSTETRICS & GYNECOLOGY

## 2021-07-09 PROCEDURE — 272N000001 HC OR GENERAL SUPPLY STERILE: Performed by: OBSTETRICS & GYNECOLOGY

## 2021-07-09 PROCEDURE — 250N000011 HC RX IP 250 OP 636

## 2021-07-09 PROCEDURE — 86850 RBC ANTIBODY SCREEN: CPT | Performed by: OBSTETRICS & GYNECOLOGY

## 2021-07-09 PROCEDURE — 250N000011 HC RX IP 250 OP 636: Performed by: NURSE ANESTHETIST, CERTIFIED REGISTERED

## 2021-07-09 PROCEDURE — 250N000013 HC RX MED GY IP 250 OP 250 PS 637: Performed by: OBSTETRICS & GYNECOLOGY

## 2021-07-09 PROCEDURE — 250N000009 HC RX 250: Performed by: NURSE ANESTHETIST, CERTIFIED REGISTERED

## 2021-07-09 PROCEDURE — 258N000003 HC RX IP 258 OP 636: Performed by: NURSE ANESTHETIST, CERTIFIED REGISTERED

## 2021-07-09 PROCEDURE — 86901 BLOOD TYPING SEROLOGIC RH(D): CPT | Performed by: OBSTETRICS & GYNECOLOGY

## 2021-07-09 PROCEDURE — 59510 CESAREAN DELIVERY: CPT | Performed by: OBSTETRICS & GYNECOLOGY

## 2021-07-09 PROCEDURE — 80307 DRUG TEST PRSMV CHEM ANLYZR: CPT | Performed by: OBSTETRICS & GYNECOLOGY

## 2021-07-09 PROCEDURE — 0UB40ZX EXCISION OF UTERINE SUPPORTING STRUCTURE, OPEN APPROACH, DIAGNOSTIC: ICD-10-PCS | Performed by: OBSTETRICS & GYNECOLOGY

## 2021-07-09 PROCEDURE — 85027 COMPLETE CBC AUTOMATED: CPT | Performed by: OBSTETRICS & GYNECOLOGY

## 2021-07-09 PROCEDURE — 250N000013 HC RX MED GY IP 250 OP 250 PS 637

## 2021-07-09 PROCEDURE — 88304 TISSUE EXAM BY PATHOLOGIST: CPT | Mod: TC | Performed by: OBSTETRICS & GYNECOLOGY

## 2021-07-09 PROCEDURE — 360N000076 HC SURGERY LEVEL 3, PER MIN: Performed by: OBSTETRICS & GYNECOLOGY

## 2021-07-09 RX ORDER — BUPIVACAINE HYDROCHLORIDE 7.5 MG/ML
INJECTION, SOLUTION INTRASPINAL PRN
Status: DISCONTINUED | OUTPATIENT
Start: 2021-07-09 | End: 2021-07-09

## 2021-07-09 RX ORDER — OXYTOCIN/0.9 % SODIUM CHLORIDE 30/500 ML
100 PLASTIC BAG, INJECTION (ML) INTRAVENOUS CONTINUOUS
Status: DISCONTINUED | OUTPATIENT
Start: 2021-07-09 | End: 2021-07-12 | Stop reason: HOSPADM

## 2021-07-09 RX ORDER — MODIFIED LANOLIN
OINTMENT (GRAM) TOPICAL
Status: DISCONTINUED | OUTPATIENT
Start: 2021-07-09 | End: 2021-07-12 | Stop reason: HOSPADM

## 2021-07-09 RX ORDER — SODIUM CHLORIDE, SODIUM LACTATE, POTASSIUM CHLORIDE, CALCIUM CHLORIDE 600; 310; 30; 20 MG/100ML; MG/100ML; MG/100ML; MG/100ML
INJECTION, SOLUTION INTRAVENOUS CONTINUOUS
Status: DISCONTINUED | OUTPATIENT
Start: 2021-07-09 | End: 2021-07-09

## 2021-07-09 RX ORDER — SIMETHICONE 80 MG
80 TABLET,CHEWABLE ORAL 4 TIMES DAILY PRN
Status: DISCONTINUED | OUTPATIENT
Start: 2021-07-09 | End: 2021-07-12 | Stop reason: HOSPADM

## 2021-07-09 RX ORDER — AMOXICILLIN 250 MG
2 CAPSULE ORAL 2 TIMES DAILY
Status: DISCONTINUED | OUTPATIENT
Start: 2021-07-09 | End: 2021-07-12 | Stop reason: HOSPADM

## 2021-07-09 RX ORDER — ACETAMINOPHEN 325 MG/1
975 TABLET ORAL EVERY 6 HOURS
Status: DISPENSED | OUTPATIENT
Start: 2021-07-09 | End: 2021-07-12

## 2021-07-09 RX ORDER — ACETAMINOPHEN 325 MG/1
975 TABLET ORAL EVERY 6 HOURS
Status: DISCONTINUED | OUTPATIENT
Start: 2021-07-09 | End: 2021-07-09

## 2021-07-09 RX ORDER — KETOROLAC TROMETHAMINE 30 MG/ML
30 INJECTION, SOLUTION INTRAMUSCULAR; INTRAVENOUS EVERY 6 HOURS
Status: COMPLETED | OUTPATIENT
Start: 2021-07-09 | End: 2021-07-10

## 2021-07-09 RX ORDER — NALBUPHINE HYDROCHLORIDE 10 MG/ML
2.5-5 INJECTION, SOLUTION INTRAMUSCULAR; INTRAVENOUS; SUBCUTANEOUS EVERY 6 HOURS PRN
Status: DISCONTINUED | OUTPATIENT
Start: 2021-07-09 | End: 2021-07-12 | Stop reason: HOSPADM

## 2021-07-09 RX ORDER — BISACODYL 10 MG
10 SUPPOSITORY, RECTAL RECTAL DAILY PRN
Status: DISCONTINUED | OUTPATIENT
Start: 2021-07-11 | End: 2021-07-12 | Stop reason: HOSPADM

## 2021-07-09 RX ORDER — ONDANSETRON 2 MG/ML
4 INJECTION INTRAMUSCULAR; INTRAVENOUS EVERY 6 HOURS PRN
Status: DISCONTINUED | OUTPATIENT
Start: 2021-07-09 | End: 2021-07-12 | Stop reason: HOSPADM

## 2021-07-09 RX ORDER — HYDROCORTISONE 2.5 %
CREAM (GRAM) TOPICAL 3 TIMES DAILY PRN
Status: DISCONTINUED | OUTPATIENT
Start: 2021-07-09 | End: 2021-07-09

## 2021-07-09 RX ORDER — ONDANSETRON 2 MG/ML
4 INJECTION INTRAMUSCULAR; INTRAVENOUS ONCE
Status: DISCONTINUED | OUTPATIENT
Start: 2021-07-09 | End: 2021-07-09

## 2021-07-09 RX ORDER — SCOLOPAMINE TRANSDERMAL SYSTEM 1 MG/1
PATCH, EXTENDED RELEASE TRANSDERMAL
Status: DISCONTINUED
Start: 2021-07-09 | End: 2021-07-09 | Stop reason: HOSPADM

## 2021-07-09 RX ORDER — CEFAZOLIN SODIUM 1 G/3ML
1 INJECTION, POWDER, FOR SOLUTION INTRAMUSCULAR; INTRAVENOUS SEE ADMIN INSTRUCTIONS
Status: DISCONTINUED | OUTPATIENT
Start: 2021-07-09 | End: 2021-07-10

## 2021-07-09 RX ORDER — HYDROMORPHONE HYDROCHLORIDE 1 MG/ML
.3-.5 INJECTION, SOLUTION INTRAMUSCULAR; INTRAVENOUS; SUBCUTANEOUS EVERY 30 MIN PRN
Status: DISCONTINUED | OUTPATIENT
Start: 2021-07-09 | End: 2021-07-12 | Stop reason: HOSPADM

## 2021-07-09 RX ORDER — FENTANYL CITRATE 50 UG/ML
25-50 INJECTION, SOLUTION INTRAMUSCULAR; INTRAVENOUS
Status: DISCONTINUED | OUTPATIENT
Start: 2021-07-09 | End: 2021-07-09

## 2021-07-09 RX ORDER — IBUPROFEN 400 MG/1
800 TABLET, FILM COATED ORAL EVERY 6 HOURS PRN
Status: DISCONTINUED | OUTPATIENT
Start: 2021-07-09 | End: 2021-07-09

## 2021-07-09 RX ORDER — ACETAMINOPHEN 325 MG/1
650 TABLET ORAL EVERY 4 HOURS PRN
Status: DISCONTINUED | OUTPATIENT
Start: 2021-07-12 | End: 2021-07-12 | Stop reason: HOSPADM

## 2021-07-09 RX ORDER — NALOXONE HYDROCHLORIDE 0.4 MG/ML
0.4 INJECTION, SOLUTION INTRAMUSCULAR; INTRAVENOUS; SUBCUTANEOUS
Status: DISCONTINUED | OUTPATIENT
Start: 2021-07-09 | End: 2021-07-12 | Stop reason: HOSPADM

## 2021-07-09 RX ORDER — HYDROCORTISONE 2.5 %
CREAM (GRAM) TOPICAL 3 TIMES DAILY PRN
Status: DISCONTINUED | OUTPATIENT
Start: 2021-07-09 | End: 2021-07-12 | Stop reason: HOSPADM

## 2021-07-09 RX ORDER — MISOPROSTOL 200 UG/1
400 TABLET ORAL
Status: DISCONTINUED | OUTPATIENT
Start: 2021-07-09 | End: 2021-07-12 | Stop reason: HOSPADM

## 2021-07-09 RX ORDER — SCOLOPAMINE TRANSDERMAL SYSTEM 1 MG/1
1 PATCH, EXTENDED RELEASE TRANSDERMAL ONCE
Status: COMPLETED | OUTPATIENT
Start: 2021-07-09 | End: 2021-07-10

## 2021-07-09 RX ORDER — HYDROMORPHONE HYDROCHLORIDE 1 MG/ML
.3-.5 INJECTION, SOLUTION INTRAMUSCULAR; INTRAVENOUS; SUBCUTANEOUS EVERY 5 MIN PRN
Status: DISCONTINUED | OUTPATIENT
Start: 2021-07-09 | End: 2021-07-09

## 2021-07-09 RX ORDER — AMOXICILLIN 250 MG
1 CAPSULE ORAL 2 TIMES DAILY
Status: DISCONTINUED | OUTPATIENT
Start: 2021-07-09 | End: 2021-07-12 | Stop reason: HOSPADM

## 2021-07-09 RX ORDER — OXYTOCIN 10 [USP'U]/ML
10 INJECTION, SOLUTION INTRAMUSCULAR; INTRAVENOUS
Status: DISCONTINUED | OUTPATIENT
Start: 2021-07-09 | End: 2021-07-12 | Stop reason: HOSPADM

## 2021-07-09 RX ORDER — TRANEXAMIC ACID 10 MG/ML
1 INJECTION, SOLUTION INTRAVENOUS EVERY 30 MIN PRN
Status: DISCONTINUED | OUTPATIENT
Start: 2021-07-09 | End: 2021-07-12 | Stop reason: HOSPADM

## 2021-07-09 RX ORDER — ACETAMINOPHEN 325 MG/1
TABLET ORAL
Status: COMPLETED
Start: 2021-07-09 | End: 2021-07-09

## 2021-07-09 RX ORDER — ONDANSETRON 4 MG/1
4 TABLET, ORALLY DISINTEGRATING ORAL EVERY 30 MIN PRN
Status: DISCONTINUED | OUTPATIENT
Start: 2021-07-09 | End: 2021-07-09

## 2021-07-09 RX ORDER — OXYCODONE HYDROCHLORIDE 5 MG/1
5 TABLET ORAL EVERY 4 HOURS PRN
Status: DISCONTINUED | OUTPATIENT
Start: 2021-07-09 | End: 2021-07-12 | Stop reason: HOSPADM

## 2021-07-09 RX ORDER — LIDOCAINE 40 MG/G
CREAM TOPICAL
Status: DISCONTINUED | OUTPATIENT
Start: 2021-07-09 | End: 2021-07-12 | Stop reason: HOSPADM

## 2021-07-09 RX ORDER — ONDANSETRON 4 MG/1
4 TABLET, ORALLY DISINTEGRATING ORAL EVERY 6 HOURS PRN
Status: DISCONTINUED | OUTPATIENT
Start: 2021-07-09 | End: 2021-07-12 | Stop reason: HOSPADM

## 2021-07-09 RX ORDER — KETOROLAC TROMETHAMINE 30 MG/ML
INJECTION, SOLUTION INTRAMUSCULAR; INTRAVENOUS PRN
Status: DISCONTINUED | OUTPATIENT
Start: 2021-07-09 | End: 2021-07-09

## 2021-07-09 RX ORDER — ONDANSETRON 2 MG/ML
INJECTION INTRAMUSCULAR; INTRAVENOUS
Status: DISCONTINUED
Start: 2021-07-09 | End: 2021-07-09 | Stop reason: HOSPADM

## 2021-07-09 RX ORDER — FENTANYL CITRATE 50 UG/ML
INJECTION, SOLUTION INTRAMUSCULAR; INTRAVENOUS PRN
Status: DISCONTINUED | OUTPATIENT
Start: 2021-07-09 | End: 2021-07-09

## 2021-07-09 RX ORDER — ONDANSETRON 2 MG/ML
4 INJECTION INTRAMUSCULAR; INTRAVENOUS ONCE
Status: DISCONTINUED | OUTPATIENT
Start: 2021-07-09 | End: 2021-07-12 | Stop reason: HOSPADM

## 2021-07-09 RX ORDER — SODIUM CHLORIDE, SODIUM LACTATE, POTASSIUM CHLORIDE, CALCIUM CHLORIDE 600; 310; 30; 20 MG/100ML; MG/100ML; MG/100ML; MG/100ML
INJECTION, SOLUTION INTRAVENOUS CONTINUOUS
Status: DISCONTINUED | OUTPATIENT
Start: 2021-07-09 | End: 2021-07-12 | Stop reason: HOSPADM

## 2021-07-09 RX ORDER — BISACODYL 10 MG
10 SUPPOSITORY, RECTAL RECTAL DAILY PRN
Status: DISCONTINUED | OUTPATIENT
Start: 2021-07-11 | End: 2021-07-09

## 2021-07-09 RX ORDER — IBUPROFEN 400 MG/1
800 TABLET, FILM COATED ORAL EVERY 6 HOURS
Status: DISCONTINUED | OUTPATIENT
Start: 2021-07-10 | End: 2021-07-12 | Stop reason: HOSPADM

## 2021-07-09 RX ORDER — NALOXONE HYDROCHLORIDE 0.4 MG/ML
0.2 INJECTION, SOLUTION INTRAMUSCULAR; INTRAVENOUS; SUBCUTANEOUS
Status: DISCONTINUED | OUTPATIENT
Start: 2021-07-09 | End: 2021-07-12 | Stop reason: HOSPADM

## 2021-07-09 RX ORDER — CEFAZOLIN SODIUM 2 G/100ML
INJECTION, SOLUTION INTRAVENOUS
Status: DISCONTINUED
Start: 2021-07-09 | End: 2021-07-09 | Stop reason: HOSPADM

## 2021-07-09 RX ORDER — KETOROLAC TROMETHAMINE 30 MG/ML
30 INJECTION, SOLUTION INTRAMUSCULAR; INTRAVENOUS EVERY 6 HOURS
Status: DISCONTINUED | OUTPATIENT
Start: 2021-07-09 | End: 2021-07-09

## 2021-07-09 RX ORDER — ONDANSETRON 2 MG/ML
4 INJECTION INTRAMUSCULAR; INTRAVENOUS EVERY 30 MIN PRN
Status: DISCONTINUED | OUTPATIENT
Start: 2021-07-09 | End: 2021-07-09

## 2021-07-09 RX ORDER — ONDANSETRON 2 MG/ML
INJECTION INTRAMUSCULAR; INTRAVENOUS PRN
Status: DISCONTINUED | OUTPATIENT
Start: 2021-07-09 | End: 2021-07-09

## 2021-07-09 RX ORDER — CITRIC ACID/SODIUM CITRATE 334-500MG
30 SOLUTION, ORAL ORAL
Status: COMPLETED | OUTPATIENT
Start: 2021-07-09 | End: 2021-07-09

## 2021-07-09 RX ORDER — ACETAMINOPHEN 325 MG/1
975 TABLET ORAL ONCE
Status: COMPLETED | OUTPATIENT
Start: 2021-07-09 | End: 2021-07-09

## 2021-07-09 RX ORDER — CEFAZOLIN SODIUM 2 G/100ML
2 INJECTION, SOLUTION INTRAVENOUS
Status: COMPLETED | OUTPATIENT
Start: 2021-07-09 | End: 2021-07-09

## 2021-07-09 RX ORDER — PROPOFOL 10 MG/ML
INJECTION, EMULSION INTRAVENOUS PRN
Status: DISCONTINUED | OUTPATIENT
Start: 2021-07-09 | End: 2021-07-09

## 2021-07-09 RX ORDER — OXYTOCIN/0.9 % SODIUM CHLORIDE 30/500 ML
PLASTIC BAG, INJECTION (ML) INTRAVENOUS CONTINUOUS PRN
Status: DISCONTINUED | OUTPATIENT
Start: 2021-07-09 | End: 2021-07-09

## 2021-07-09 RX ORDER — OXYTOCIN/0.9 % SODIUM CHLORIDE 30/500 ML
340 PLASTIC BAG, INJECTION (ML) INTRAVENOUS CONTINUOUS PRN
Status: DISCONTINUED | OUTPATIENT
Start: 2021-07-09 | End: 2021-07-12 | Stop reason: HOSPADM

## 2021-07-09 RX ORDER — MORPHINE SULFATE 1 MG/ML
INJECTION, SOLUTION EPIDURAL; INTRATHECAL; INTRAVENOUS PRN
Status: DISCONTINUED | OUTPATIENT
Start: 2021-07-09 | End: 2021-07-09

## 2021-07-09 RX ORDER — DEXTROSE, SODIUM CHLORIDE, SODIUM LACTATE, POTASSIUM CHLORIDE, AND CALCIUM CHLORIDE 5; .6; .31; .03; .02 G/100ML; G/100ML; G/100ML; G/100ML; G/100ML
INJECTION, SOLUTION INTRAVENOUS CONTINUOUS
Status: DISCONTINUED | OUTPATIENT
Start: 2021-07-09 | End: 2021-07-09

## 2021-07-09 RX ORDER — ONDANSETRON 2 MG/ML
4 INJECTION INTRAMUSCULAR; INTRAVENOUS ONCE
Status: COMPLETED | OUTPATIENT
Start: 2021-07-09 | End: 2021-07-09

## 2021-07-09 RX ORDER — DEXTROSE, SODIUM CHLORIDE, SODIUM LACTATE, POTASSIUM CHLORIDE, AND CALCIUM CHLORIDE 5; .6; .31; .03; .02 G/100ML; G/100ML; G/100ML; G/100ML; G/100ML
INJECTION, SOLUTION INTRAVENOUS CONTINUOUS
Status: DISCONTINUED | OUTPATIENT
Start: 2021-07-09 | End: 2021-07-12 | Stop reason: HOSPADM

## 2021-07-09 RX ORDER — CITRIC ACID/SODIUM CITRATE 334-500MG
SOLUTION, ORAL ORAL
Status: COMPLETED
Start: 2021-07-09 | End: 2021-07-09

## 2021-07-09 RX ADMIN — Medication 100 ML/HR: at 16:12

## 2021-07-09 RX ADMIN — PHENYLEPHRINE HYDROCHLORIDE 0.5 MCG/KG/MIN: 10 INJECTION INTRAVENOUS at 11:36

## 2021-07-09 RX ADMIN — DEXMEDETOMIDINE HYDROCHLORIDE 8 MCG: 100 INJECTION, SOLUTION INTRAVENOUS at 12:05

## 2021-07-09 RX ADMIN — DEXMEDETOMIDINE HYDROCHLORIDE 4 MCG: 100 INJECTION, SOLUTION INTRAVENOUS at 12:13

## 2021-07-09 RX ADMIN — CEFAZOLIN SODIUM 2 G: 2 INJECTION, SOLUTION INTRAVENOUS at 11:38

## 2021-07-09 RX ADMIN — PROPOFOL 10 MG: 10 INJECTION, EMULSION INTRAVENOUS at 12:23

## 2021-07-09 RX ADMIN — Medication 30 ML: at 11:17

## 2021-07-09 RX ADMIN — PROPOFOL 10 MG: 10 INJECTION, EMULSION INTRAVENOUS at 12:20

## 2021-07-09 RX ADMIN — ONDANSETRON 4 MG: 2 INJECTION INTRAMUSCULAR; INTRAVENOUS at 15:42

## 2021-07-09 RX ADMIN — KETOROLAC TROMETHAMINE 30 MG: 30 INJECTION, SOLUTION INTRAMUSCULAR at 20:42

## 2021-07-09 RX ADMIN — SENNOSIDES AND DOCUSATE SODIUM 1 TABLET: 8.6; 5 TABLET ORAL at 20:42

## 2021-07-09 RX ADMIN — ACETAMINOPHEN 975 MG: 325 TABLET ORAL at 10:29

## 2021-07-09 RX ADMIN — MORPHINE SULFATE 0.1 MG: 1 INJECTION, SOLUTION EPIDURAL; INTRATHECAL; INTRAVENOUS at 11:35

## 2021-07-09 RX ADMIN — SODIUM CHLORIDE, SODIUM LACTATE, POTASSIUM CHLORIDE, CALCIUM CHLORIDE AND DEXTROSE MONOHYDRATE: 5; 600; 310; 30; 20 INJECTION, SOLUTION INTRAVENOUS at 21:12

## 2021-07-09 RX ADMIN — FENTANYL CITRATE 10 MCG: 50 INJECTION, SOLUTION INTRAMUSCULAR; INTRAVENOUS at 11:35

## 2021-07-09 RX ADMIN — KETOROLAC TROMETHAMINE 15 MG: 30 INJECTION, SOLUTION INTRAMUSCULAR at 12:44

## 2021-07-09 RX ADMIN — ACETAMINOPHEN 975 MG: 325 TABLET, FILM COATED ORAL at 20:42

## 2021-07-09 RX ADMIN — SODIUM CHLORIDE, POTASSIUM CHLORIDE, SODIUM LACTATE AND CALCIUM CHLORIDE: 600; 310; 30; 20 INJECTION, SOLUTION INTRAVENOUS at 10:00

## 2021-07-09 RX ADMIN — ACETAMINOPHEN 975 MG: 325 TABLET, FILM COATED ORAL at 10:29

## 2021-07-09 RX ADMIN — SCOLOPAMINE TRANSDERMAL SYSTEM 1 PATCH: 1 PATCH, EXTENDED RELEASE TRANSDERMAL at 14:49

## 2021-07-09 RX ADMIN — SCOPALAMINE 1 PATCH: 1 PATCH, EXTENDED RELEASE TRANSDERMAL at 14:49

## 2021-07-09 RX ADMIN — DEXMEDETOMIDINE HYDROCHLORIDE 8 MCG: 100 INJECTION, SOLUTION INTRAVENOUS at 12:09

## 2021-07-09 RX ADMIN — ONDANSETRON 4 MG: 2 INJECTION INTRAMUSCULAR; INTRAVENOUS at 12:02

## 2021-07-09 RX ADMIN — BUPIVACAINE HYDROCHLORIDE IN DEXTROSE 11 MG: 7.5 INJECTION, SOLUTION SUBARACHNOID at 11:35

## 2021-07-09 RX ADMIN — SODIUM CITRATE AND CITRIC ACID MONOHYDRATE 30 ML: 500; 334 SOLUTION ORAL at 11:17

## 2021-07-09 RX ADMIN — SODIUM CHLORIDE, POTASSIUM CHLORIDE, SODIUM LACTATE AND CALCIUM CHLORIDE: 600; 310; 30; 20 INJECTION, SOLUTION INTRAVENOUS at 11:49

## 2021-07-09 RX ADMIN — NALBUPHINE HYDROCHLORIDE 2.5 MG: 10 INJECTION, SOLUTION INTRAMUSCULAR; INTRAVENOUS; SUBCUTANEOUS at 22:39

## 2021-07-09 RX ADMIN — Medication 340 ML/HR: at 11:59

## 2021-07-09 ASSESSMENT — MIFFLIN-ST. JEOR: SCORE: 1582.15

## 2021-07-09 NOTE — ANESTHESIA PREPROCEDURE EVALUATION
Anesthesia Pre-Procedure Evaluation    Patient: An Roman   MRN: [id number] : 1980        Preoperative Diagnosis: History of  section complicating pregnancy [O34.219]  AMA (advanced maternal age) multigravida 35+, third trimester [O09.523]   Procedure : Procedure(s):  REPEAT  SECTION,  EXCISION OF OVARIAN CYST     Past Medical History:   Diagnosis Date     Exercise induced bronchospasm      Hypertension     HTN hx prior to preg     Pyelonephritis     While in [place]     Uncomplicated asthma     Exercise induced asthma, has an emergency inhaler is needed, hasn't used in many years.      Past Surgical History:   Procedure Laterality Date     BREAST SURGERY  [id number]    Breast Augmentatation, saline implants.      SECTION N/A 2017    Procedure:  SECTION;;  Surgeon: Caryn Loznao MD;  Location:  L+D     ENT SURGERY      [place] teeth removed.      Allergies   Allergen Reactions     Nkda [No Known Drug Allergies]       Social History     Tobacco Use     Smoking status: Former Smoker     Years: 10.00     Quit date: 3/5/2017     Years since quittin.3     Smokeless tobacco: Former User     Tobacco comment: Social smoker, has not smoked since pregnancy   Substance Use Topics     Alcohol use: Not Currently      Wt Readings from Last 1 Encounters:   21 91.6 kg (202 lb)        Anesthesia Evaluation   Pt has had prior anesthetic.     No history of anesthetic complications       ROS/MED HX  ENT/Pulmonary:     (+) asthma  (-) sleep apnea   Neurologic: Comment: CLBP, h/o entire right sided body numbness       Cardiovascular:       METS/Exercise Tolerance:     Hematologic:     (+) anemia,     Musculoskeletal:       GI/Hepatic:     (+) GERD,     Renal/Genitourinary:       Endo: Comment: Ovarian cyst       Psychiatric/Substance Use:       Infectious Disease:       Malignancy:       Other:            Physical Exam    Airway        Mallampati: II   TM  distance: > 3 FB   Neck ROM: full   Mouth opening: > 3 cm    Respiratory Devices and Support         Dental  no notable dental history         Cardiovascular          Rhythm and rate: regular     Pulmonary           breath sounds clear to auscultation           OUTSIDE LABS:  CBC:   Lab Results   Component Value Date    WBC 10.6 07/09/2021    WBC 13.6 (H) 05/11/2021    HGB 10.5 (L) 07/09/2021    HGB 10.3 (L) 05/11/2021    HCT 32.0 (L) 07/09/2021    HCT 32.0 (L) 05/11/2021     07/09/2021     05/11/2021     BMP:   Lab Results   Component Value Date     03/17/2021    POTASSIUM 3.8 03/17/2021    CHLORIDE 107 03/17/2021    CO2 23 03/17/2021    BUN 10 03/17/2021    CR 0.64 03/17/2021    GLC 79 03/17/2021     COAGS: No results found for: PTT, INR, FIBR  POC:   Lab Results   Component Value Date    HCG Negative 05/03/2019     HEPATIC:   Lab Results   Component Value Date    ALBUMIN 2.8 (L) 03/17/2021    PROTTOTAL 6.5 (L) 03/17/2021    ALT 18 03/17/2021    AST 16 03/17/2021    ALKPHOS 78 03/17/2021    BILITOTAL 0.3 03/17/2021     OTHER:   Lab Results   Component Value Date    LINA 8.2 (L) 03/17/2021       Anesthesia Plan    ASA Status:  2      Anesthesia Type: Spinal.              Consents    Anesthesia Plan(s) and associated risks, benefits, and realistic alternatives discussed. Questions answered and patient/representative(s) expressed understanding.     - Discussed with:  Patient    Use of blood products discussed: Yes.     Postoperative Care       PONV prophylaxis: Ondansetron (or other 5HT-3)     Comments:                Christiane De MD

## 2021-07-09 NOTE — H&P
No significant change in general health status based on examination of the patient, review of Nursing Admission Database and prenatal record.    EFW: 8lb 8oz     Repeat c section and ovarian cystectomy right  38w 5d  AMA, chronic htn

## 2021-07-09 NOTE — PLAN OF CARE
Data: An Roman transferred to 421 via wheelchair at 1530. Baby transferred via crib.  Action: Receiving unit notified of transfer: Yes. Patient and family notified of room change. Report given to Lydia WINSTON RN at 1540. Belongings sent to receiving unit. Accompanied by Registered Nurse. Oriented patient to surroundings. Call light within reach. ID bands double-checked with receiving RN.  Response: Patient tolerated transfer and is stable.

## 2021-07-09 NOTE — ANESTHESIA POSTPROCEDURE EVALUATION
Patient: An Roman    Procedure(s):  REPEAT  SECTION,  EXCISION OF OVARIAN CYST    Diagnosis:History of  section complicating pregnancy [O34.219]  AMA (advanced maternal age) multigravida 35+, third trimester [O09.523]  Diagnosis Additional Information: No value filed.    Anesthesia Type:  Spinal    Note:     Postop Pain Control: Uneventful   PONV: Yes            Sign Out: Ongoing Nausea   Neuro/Psych: Uneventful            Sign Out: Acceptable/Baseline neuro status   Airway/Respiratory: Uneventful            Sign Out: Acceptable/Baseline resp. status   CV/Hemodynamics: Uneventful            Sign Out: Acceptable CV status   Other NRE: NONE   DID A NON-ROUTINE EVENT OCCUR? No           Last vitals:  Vitals:    21 1400 21 1415 21 1430   BP: 105/72 (!) 115/36 109/73   Pulse: (!) 49 50 61   Resp: 13 16 15   Temp:      SpO2: 98% 99% 98%       Last vitals prior to Anesthesia Care Transfer:  CRNA VITALS  2021 1220 - 2021 1320      2021             Resp Rate (set):  10          Electronically Signed By: Christiane De MD  2021  3:45 PM

## 2021-07-09 NOTE — ANESTHESIA PROCEDURE NOTES
Intrathecal Procedure Note  Pre-Procedure   Staff -        Anesthesiologist:  Christiane De MD       Performed By: anesthesiologist       Location: OB       Pre-Anesthestic Checklist: patient identified, risks and benefits discussed, informed consent and pre-op evaluation  Timeout:       Correct Patient: Yes        Correct Procedure: Yes   Procedure Documentation  Procedure: intrathecal       Patient Position: sitting       Patient Prep/Sterile Barriers: sterile gloves, mask, patient draped       Skin prep: Betadine       Insertion Site: L3-4. (midline approach).       Needle Gauge: 25.        Needle Length (Inches): 3.5        Spinal Needle Type: Qing-Liam       Introducer used      # of attempts: 1 and  # of redirects:     Assessment/Narrative         Paresthesias: No.       CSF fluid: clear.    Comments:  Times one.No complications. No Blood.

## 2021-07-09 NOTE — ANESTHESIA CARE TRANSFER NOTE
Patient: An Roman    Procedure(s):  REPEAT  SECTION,  EXCISION OF OVARIAN CYST    Diagnosis: History of  section complicating pregnancy [O34.219]  AMA (advanced maternal age) multigravida 35+, third trimester [O09.523]  Diagnosis Additional Information: No value filed.    Anesthesia Type:   Spinal     Note:    Oropharynx: oropharynx clear of all foreign objects and spontaneously breathing  Level of Consciousness: drowsy  Oxygen Supplementation: room air    Independent Airway: airway patency satisfactory and stable  Dentition: dentition unchanged  Vital Signs Stable: post-procedure vital signs reviewed and stable  Report to RN Given: handoff report given  Patient transferred to: Labor and Delivery    Handoff Report: Identifed the Patient, Identified the Reponsible Provider, Reviewed the pertinent medical history, Discussed the surgical course, Reviewed Intra-OP anesthesia mangement and issues during anesthesia, Set expectations for post-procedure period and Allowed opportunity for questions and acknowledgement of understanding      Vitals: (Last set prior to Anesthesia Care Transfer)  CRNA VITALS  2021 1220 - 2021 1302      2021             Resp Rate   14        Electronically Signed By: YUNIEL Johnson CRNA  2021  1:02 PM

## 2021-07-09 NOTE — BRIEF OP NOTE
[facility] Brief Operative Note    Pre-operative diagnosis: History of  section complicating pregnancy [O34.219]  AMA (advanced maternal age) multigravida 35+, third trimester [O09.523]   Post-operative diagnosis Same, right fallopian tube cyst   Procedure: Repeat c section, excision of right fallopian tube cyst   Surgeon(s): Surgeon(s) and Role:     * Caryn Lozano MD - Primary   610 cc   Estimated blood loss: AM and 2021 12:50 PM *    Specimens: ID Type Source Tests Collected by Time Destination   1 :  Tissue Umbilical Cord SEE PROVIDERS ORDERS Caryn Lozano MD 2021 11:58 AM    2 :  Cord blood Umbilical Cord OR DOCUMENTATION ONLY Caryn Lozano MD 2021 11:58 AM    3 :  Placenta Placenta SPECIMEN DISCARDED, OR DOCUMENTATION ONLY Caryn Lozano MD 2021 11:58 AM       Findings: 4 cm right fallopian tube cyst below right tube, normal ovaries  Male infant   9lb  apgars 8/9  Extension 1 inch midline in lower uterine segment

## 2021-07-09 NOTE — OP NOTE
Procedure Date: 2021    SURGEON:  Caryn Lozano MD.    INDICATIONS FOR PROCEDURE:  This patient presented for a repeat  section, which took place on 2021.  She has a history of 2 prior  sections and she is 38 weeks and 5 days.  She also has a history of chronic hypertension, not on medications during this pregnancy.     DETAILS OF PROCEDURE:  The patient was taken for repeat .  She had a spinal anesthetic.  She was prepped and draped after Putnam was inserted.  Timeout procedure was carried out.  We placed a Pfannenstiel incision.  We entered the abdomen in layers incised the peritoneal surface vertically avoiding the bladder.  The bladder blade was inserted in the peritoneal cavity, the bladder flap was taken down with sharp dissection.  A low cervical transverse incision was made in the uterus.  The baby was delivered from a vertex presentation with occiput posterior and was delivered onto the maternal abdomen.  We did delayed cord clamp procedure and then the baby was passed for evaluation.  We obtained blood for a cord blood collection kit that the patient had arranged for privately and that was passed off and then when that was completed, the placenta was delivered manually.  The uterus exteriorized.  The uterine incision was closed with 0 Monocryl in a running locking fashion.  There was an extension in the midline about 1 inch below the incision and this was also closed with 0 Monocryl.  We did the imbricating layer with 0 Monocryl.  There was a right ovarian cyst that we originally we had thought would be ovarian, but it turned out to be a paratubal cyst.  It was located in the mesosalpinx right below the right fallopian tube.  We incised the mesosalpinx and then excised the cyst and this was sent for pathology and there was some bleeding on the surface that was dealt with with interrupted 4-0 Vicryl sutures.  Both ovaries looked normal.  The uterus returned to the  abdominal cavity.  There was still some oozing on the lower segment.  This was cauterized.  We placed an additional figure-of-eight sutures on the corners of the incision and then Surgicel powder in the incision.  The fascia was closed with 0 Vicryl.  We placed interrupted 2-0 plain sutures and the skin was closed with 4-0 Vicryl in a subcuticular fashion.  Steri-Strips and dressings were applied.  Sponge and needle counts were correct.  The estimated blood loss 610 mL The baby was a male infant, it weight 9 pounds, Apgars were 8 and 9, and the patient went to the recovery room in stable condition.    Caryn Lozano MD        D: 2021   T: 2021   MT: denise    Name:     KING LEWIS  MRN:      [id number]        Account:        [id number]   :      1980           Procedure Date: 2021     Document: [id number]

## 2021-07-10 LAB — HGB BLD-MCNC: 8.9 G/DL (ref 11.7–15.7)

## 2021-07-10 PROCEDURE — 250N000013 HC RX MED GY IP 250 OP 250 PS 637: Performed by: OBSTETRICS & GYNECOLOGY

## 2021-07-10 PROCEDURE — 250N000011 HC RX IP 250 OP 636: Performed by: OBSTETRICS & GYNECOLOGY

## 2021-07-10 PROCEDURE — 258N000003 HC RX IP 258 OP 636: Performed by: OBSTETRICS & GYNECOLOGY

## 2021-07-10 PROCEDURE — 120N000012 HC R&B POSTPARTUM

## 2021-07-10 PROCEDURE — 36415 COLL VENOUS BLD VENIPUNCTURE: CPT | Performed by: OBSTETRICS & GYNECOLOGY

## 2021-07-10 PROCEDURE — 85018 HEMOGLOBIN: CPT | Performed by: OBSTETRICS & GYNECOLOGY

## 2021-07-10 RX ADMIN — NALBUPHINE HYDROCHLORIDE 2.5 MG: 10 INJECTION, SOLUTION INTRAMUSCULAR; INTRAVENOUS; SUBCUTANEOUS at 19:58

## 2021-07-10 RX ADMIN — KETOROLAC TROMETHAMINE 30 MG: 30 INJECTION, SOLUTION INTRAMUSCULAR at 08:51

## 2021-07-10 RX ADMIN — IBUPROFEN 800 MG: 400 TABLET ORAL at 14:56

## 2021-07-10 RX ADMIN — IBUPROFEN 800 MG: 400 TABLET ORAL at 19:49

## 2021-07-10 RX ADMIN — KETOROLAC TROMETHAMINE 30 MG: 30 INJECTION, SOLUTION INTRAMUSCULAR at 02:03

## 2021-07-10 RX ADMIN — IRON SUCROSE 300 MG: 20 INJECTION, SOLUTION INTRAVENOUS at 12:56

## 2021-07-10 RX ADMIN — SENNOSIDES AND DOCUSATE SODIUM 1 TABLET: 8.6; 5 TABLET ORAL at 08:51

## 2021-07-10 RX ADMIN — ACETAMINOPHEN 975 MG: 325 TABLET, FILM COATED ORAL at 02:02

## 2021-07-10 RX ADMIN — ACETAMINOPHEN 975 MG: 325 TABLET, FILM COATED ORAL at 19:49

## 2021-07-10 RX ADMIN — SENNOSIDES AND DOCUSATE SODIUM 1 TABLET: 8.6; 5 TABLET ORAL at 19:49

## 2021-07-10 RX ADMIN — ACETAMINOPHEN 975 MG: 325 TABLET, FILM COATED ORAL at 08:51

## 2021-07-10 RX ADMIN — NALBUPHINE HYDROCHLORIDE 2.5 MG: 10 INJECTION, SOLUTION INTRAMUSCULAR; INTRAVENOUS; SUBCUTANEOUS at 04:53

## 2021-07-10 RX ADMIN — ACETAMINOPHEN 975 MG: 325 TABLET, FILM COATED ORAL at 14:56

## 2021-07-10 NOTE — PLAN OF CARE
VSS. Fundus firm and midline. Scant flow. Pain 0/10, pain controled with tylenol and ibuprofen per MAR. Breastfeeding. Incision open to air. Ambulating free of dizziness or lightheaded. Voiding without difficulty. Encouraged to call with needs, questions, or concerns. Will continue to monitor.

## 2021-07-10 NOTE — PLAN OF CARE
Vital signs stable. Postpartum assessment WDL. Dressing removed on incision, incision CDI. Voiding without difficulty. Lung sounds clear and equal. Using tylenol/ibuprofen for pain management. Up ambulating free of dizziness. Hgb 8.9; IV Venofer given. Tolerating a regular diet. Working on breastfeeding every 2-3 hours. Encouraged to call with questions/concerns. Will continue to monitor.

## 2021-07-10 NOTE — LACTATION NOTE
Routine visit with An and Infant. Breastfeeding is going well per mom.      Plan: Watch for feeding cues and feed every 2-3 hours and/or on demand. Continue to use feeding log to track intake and appropriate voids and stools. Take feeding log to first follow up appointment or weight check. Encourage skin to skin to promote frequent feedings, thermoregulation and bonding. Follow-up with healthcare provider or lactation consultant for questions or concerns.     Pt needs a pump for home. Will be [facility]'s. Instructed on signs/symptoms of engorgement/ plugged ducts and mastitis. Instructed on comfort measues and when to call MD. No further questions at this time. Very appreciative of writer's visit and information.    -Lydia Oliveros, Lactation Educator

## 2021-07-10 NOTE — PLAN OF CARE
Vital signs stable. Postpartum assessment WDL. Incision CDI. Pain controlled with Tylenol and Toradol. Patient ambulating with SBA to the bathroom. Patient denies passing gas. Tolerating crackers and water. IVF infusing. Putnam is patent with adequate urine output. Nubain given for itchiness. Breastfeeding on cue with assist. Patient and infant bonding well. Will continue with current plan of care.

## 2021-07-10 NOTE — PLAN OF CARE
Vital signs stable, afebrile, O2 sats 97-99% on RA, voiding per alfaro dressing was reinforced at midnight, FFU/1 scant rubra lochia, using an abdominal binder for incisional support prn, IVF continue, no LE edema, lungs clear, BS+ tolerating a regular diet, able to ambulate free of dizziness, able to rest, pain well controlled with medications, rates her pain 2/10, c/o itching, breast feeding cluster feeding  skin-to-skin on demand.  is here and is supportive.

## 2021-07-10 NOTE — PROGRESS NOTES
[facility] Obstetrics Post-Op / Progress Note         Assessment and Plan:    Assessment:   Post-operative day #1  Low transverse repeat  section  Notable: Acute Blood loss anemia from delivery. Asymptomatic.  Chronic Hypertension with normal blood pressures so far      Doing well.  Pain well-controlled.      Plan:   Continue to monitor blood pressures  IV Venofer X 1 this morning, An accepts this. Repeat hemoglobin in the AM  Continue postop cares           Interval History:   Putnam has been removed. Voided already. Pain is well controlled. Itching has improved with topical lotion. Bonding well with . Not having any lightheadedness or nausea or vomiting.          Significant Problems:    cHTN--normal BPs so far          Review of Systems:    The Review of Systems is negative other than noted in the HPI          Medications:     Current Facility-Administered Medications   Medication     [START ON 2021] acetaminophen (TYLENOL) tablet 650 mg     acetaminophen (TYLENOL) tablet 975 mg     [START ON 2021] bisacodyl (DULCOLAX) Suppository 10 mg     Blood Bank will determine if patient is eligible for and the proper dosage of rho (D) immune globulin     ceFAZolin (ANCEF) 1 g vial to attach to  ml bag for ADULT or 50 ml bag for PEDS     dextrose 5% in lactated ringers infusion     hydrocortisone 2.5 % cream     HYDROmorphone (PF) (DILAUDID) injection 0.3-0.5 mg     ibuprofen (ADVIL/MOTRIN) tablet 800 mg     iron sucrose (VENOFER) 300 mg in sodium chloride 0.9 % 250 mL intermittent infusion     lactated ringers BOLUS 1,000 mL     lactated ringers infusion     lanolin cream     lanolin cream     lidocaine (LMX4) cream     lidocaine (LMX4) cream     lidocaine (LMX4) cream     lidocaine (LMX4) cream     lidocaine 1 % 0.1-1 mL     lidocaine 1 % 0.1-1 mL     lidocaine 1 % 0.1-1 mL     lidocaine 1 % 1 mL     medication instruction     misoprostol (CYTOTEC) tablet 400 mcg      nalbuphine (NUBAIN) injection 2.5-5 mg     naloxone (NARCAN) injection 0.2 mg     naloxone (NARCAN) injection 0.2 mg     naloxone (NARCAN) injection 0.4 mg     naloxone (NARCAN) injection 0.4 mg     No MMR Needed -  Assessment: Patient does not need MMR vaccine     No MMR Needed -  Assessment: Patient does not need MMR vaccine     NO Rho (D)  immune globulin (RhoGam) needed  - mother INELIGIBLE     No Tdap Needed - Assessment: Patient does not need Tdap vaccine     No Tdap Needed - Assessment: Patient does not need Tdap vaccine     ondansetron (ZOFRAN-ODT) ODT tab 4 mg    Or     ondansetron (ZOFRAN) injection 4 mg     ondansetron (ZOFRAN) injection 4 mg     ondansetron (ZOFRAN) injection 4 mg     ondansetron (ZOFRAN) injection 4 mg     Opioid plan postpartum - medication instruction     oxyCODONE (ROXICODONE) tablet 5 mg     oxyCODONE (ROXICODONE) tablet 5 mg     oxytocin (PITOCIN) 30 units in 500 mL 0.9% NaCl infusion     oxytocin (PITOCIN) 30 units in 500 mL 0.9% NaCl infusion     oxytocin (PITOCIN) 30 units in 500 mL 0.9% NaCl infusion     oxytocin (PITOCIN) 30 units in 500 mL 0.9% NaCl infusion     oxytocin (PITOCIN) injection 10 Units     oxytocin (PITOCIN) injection 10 Units     scopolamine (TRANSDERM) 72 hr patch 1 patch     scopolamine (TRANSDERM-SCOP) Patch in Place     senna-docusate (SENOKOT-S/PERICOLACE) 8.6-50 MG per tablet 1 tablet    Or     senna-docusate (SENOKOT-S/PERICOLACE) 8.6-50 MG per tablet 2 tablet     senna-docusate (SENOKOT-S/PERICOLACE) 8.6-50 MG per tablet 1 tablet    Or     senna-docusate (SENOKOT-S/PERICOLACE) 8.6-50 MG per tablet 2 tablet     simethicone (MYLICON) chewable tablet 80 mg     simethicone (MYLICON) chewable tablet 80 mg     sodium chloride (PF) 0.9% PF flush 3 mL     sodium chloride (PF) 0.9% PF flush 3 mL     sodium chloride (PF) 0.9% PF flush 3 mL     sodium chloride (PF) 0.9% PF flush 3 mL     sodium chloride (PF) 0.9% PF flush 3 mL     sodium chloride (PF) 0.9% PF  flush 3 mL     sodium chloride (PF) 0.9% PF flush 3 mL     sodium chloride (PF) 0.9% PF flush 3 mL     [START ON 7/11/2021] sodium phosphate (FLEET ENEMA) 1 enema     [START ON 7/11/2021] sodium phosphate (FLEET ENEMA) 1 enema     tranexamic acid 1 g in 100 mL 0.7% NaCl IV bag (premix)     tranexamic acid 1 g in 100 mL 0.7% NaCl IV bag (premix)             Physical Exam:     Patient Vitals for the past 24 hrs:   BP Temp Temp src Pulse Resp SpO2   07/10/21 0800 117/63 97.9  F (36.6  C) Oral 58 18 100 %   07/10/21 0635 -- -- -- -- 18 98 %   07/10/21 0400 107/59 98.3  F (36.8  C) Oral 66 18 99 %   07/10/21 0202 -- -- -- -- 18 97 %   07/10/21 0030 107/54 98.4  F (36.9  C) Oral 56 18 98 %   07/09/21 2100 119/72 -- -- 53 16 100 %   07/09/21 2000 107/54 -- -- (!) 48 16 100 %   07/09/21 1900 103/69 -- -- 50 16 100 %   07/09/21 1800 109/67 -- -- 52 16 100 %   07/09/21 1700 118/81 -- -- 64 10 100 %   07/09/21 1630 115/71 97.8  F (36.6  C) Axillary 50 16 99 %   07/09/21 1500 106/72 -- -- 65 (!) 7 99 %   07/09/21 1445 106/76 -- -- (!) 47 8 98 %   07/09/21 1430 109/73 -- -- 61 15 98 %   07/09/21 1415 (!) 115/36 -- -- 50 16 99 %   07/09/21 1400 105/72 -- -- (!) 49 13 98 %   07/09/21 1345 108/68 -- -- 67 (!) 37 97 %   07/09/21 1330 113/76 -- -- 90 18 98 %   07/09/21 1315 110/75 -- -- 61 19 98 %   07/09/21 1300 109/72 -- -- 54 (!) 0 98 %   07/09/21 1256 111/68 -- -- 53 -- --     GEN: NAD  GI: soft, non-distended. Incision is covered with bandage. Fundus is firm and below the umbilicus  Ext: non-tender          Data:     Hemoglobin   Date Value Ref Range Status   07/10/2021 8.9 (L) 11.7 - 15.7 g/dL Final   07/09/2021 10.5 (L) 11.7 - 15.7 g/dL Final   05/11/2021 10.3 (L) 11.7 - 15.7 g/dL Final   03/17/2021 11.6 (L) 11.7 - 15.7 g/dL Final   12/21/2020 12.0 11.7 - 15.7 g/dL Final     -    Nancy Worley MD

## 2021-07-10 NOTE — PLAN OF CARE
Pt. admitted from L&D via bed. Pt. arrived with baby and was accompanied by spouse and arrived with personal belongings. Report was taken from Cecelia TALAVERA RN in L&D. Pt. is A&Ox3 and VSS on RA. Fundus is firm and midline. Vaginal bleeding is scant. Pt. denies pain, nausea, CP, SOB, lightheadedness, or dizziness. LS clear and BS active. PIV patent and infusing. Putnam patent and draining.  Dressing to lower abdomen CDI. Pneumoboots in place to BLE. Pt. oriented to the room and call light system.

## 2021-07-10 NOTE — LACTATION NOTE
Initial Lactation visit with An, EFRA, and baby boy. An reports feeding is going well so far. Latch feels like a tug or pull. Infant is done with OT per protocol. Will need glucose serum at 24 hours. An reports history of low milk supply. Discussed pumping. Recommend unlimited, frequent breast feedings: At least 8 - 12 times every 24 hours. Recommended rooming in. Instructed in hand expression. Avoid pacifiers and supplementation with formula unless medically indicated. Explained benefits of holding baby skin on skin to help promote better breastfeeding outcomes. An is deciding on a pump for home use. Will revisit as needed.    Merced John RN, IBCLC

## 2021-07-11 LAB — HGB BLD-MCNC: 9 G/DL (ref 11.7–15.7)

## 2021-07-11 PROCEDURE — 250N000011 HC RX IP 250 OP 636: Performed by: OBSTETRICS & GYNECOLOGY

## 2021-07-11 PROCEDURE — 250N000013 HC RX MED GY IP 250 OP 250 PS 637: Performed by: OBSTETRICS & GYNECOLOGY

## 2021-07-11 PROCEDURE — 120N000012 HC R&B POSTPARTUM

## 2021-07-11 PROCEDURE — 258N000003 HC RX IP 258 OP 636: Performed by: OBSTETRICS & GYNECOLOGY

## 2021-07-11 PROCEDURE — 36415 COLL VENOUS BLD VENIPUNCTURE: CPT | Performed by: OBSTETRICS & GYNECOLOGY

## 2021-07-11 PROCEDURE — 85018 HEMOGLOBIN: CPT | Performed by: OBSTETRICS & GYNECOLOGY

## 2021-07-11 RX ORDER — IBUPROFEN 800 MG/1
800 TABLET, FILM COATED ORAL EVERY 6 HOURS
Qty: 30 TABLET | Refills: 0 | Status: SHIPPED | OUTPATIENT
Start: 2021-07-11 | End: 2021-08-06

## 2021-07-11 RX ORDER — OXYCODONE HYDROCHLORIDE 5 MG/1
5 TABLET ORAL EVERY 6 HOURS PRN
Qty: 20 TABLET | Refills: 0 | Status: SHIPPED | OUTPATIENT
Start: 2021-07-11 | End: 2021-08-06

## 2021-07-11 RX ORDER — ACETAMINOPHEN 325 MG/1
650 TABLET ORAL EVERY 4 HOURS PRN
Qty: 30 TABLET | Refills: 0 | Status: SHIPPED | OUTPATIENT
Start: 2021-07-12 | End: 2021-08-06

## 2021-07-11 RX ADMIN — IBUPROFEN 800 MG: 400 TABLET ORAL at 02:00

## 2021-07-11 RX ADMIN — IRON SUCROSE 300 MG: 20 INJECTION, SOLUTION INTRAVENOUS at 11:08

## 2021-07-11 RX ADMIN — IBUPROFEN 800 MG: 400 TABLET ORAL at 14:40

## 2021-07-11 RX ADMIN — IBUPROFEN 800 MG: 400 TABLET ORAL at 20:44

## 2021-07-11 RX ADMIN — SENNOSIDES AND DOCUSATE SODIUM 1 TABLET: 8.6; 5 TABLET ORAL at 08:55

## 2021-07-11 RX ADMIN — ACETAMINOPHEN 975 MG: 325 TABLET, FILM COATED ORAL at 08:55

## 2021-07-11 RX ADMIN — ACETAMINOPHEN 975 MG: 325 TABLET, FILM COATED ORAL at 20:44

## 2021-07-11 RX ADMIN — ACETAMINOPHEN 975 MG: 325 TABLET, FILM COATED ORAL at 02:00

## 2021-07-11 RX ADMIN — OXYCODONE HYDROCHLORIDE 5 MG: 5 TABLET ORAL at 18:43

## 2021-07-11 RX ADMIN — ACETAMINOPHEN 975 MG: 325 TABLET, FILM COATED ORAL at 14:40

## 2021-07-11 RX ADMIN — IBUPROFEN 800 MG: 400 TABLET ORAL at 08:55

## 2021-07-11 RX ADMIN — SENNOSIDES AND DOCUSATE SODIUM 1 TABLET: 8.6; 5 TABLET ORAL at 20:44

## 2021-07-11 RX ADMIN — OXYCODONE HYDROCHLORIDE 5 MG: 5 TABLET ORAL at 14:48

## 2021-07-11 NOTE — PLAN OF CARE
Vital signs stable. Postpartum assessment WDL. Incision clean, dry, and intact. Pain controlled with tylenol, ibuprofen, and oxycodone. Patient ambulating independently in room. Patient passing gas. Breastfeeding is going well. Patient and infant bonding well. Will continue with current plan of care.

## 2021-07-11 NOTE — PLAN OF CARE
Vss, RA.  LS clear.  BS present. Pt passing flatus.  C/o itching.  Nubain given x1.  UO adequate.  Incision intact with liquid bandage.  Breastfeeding well.  Independent with infant cares.  Tylenol and ibuprofen controlling pain well.

## 2021-07-11 NOTE — DOWNTIME EVENT NOTE
The EMR was down for 4 hours on 7/11/2021.    I was responsible for completing the paper charting during this time period.     The following information was re-entered into the system by Abril Frey RN: MAR    The following information will remain in the paper chart: only Medications given.    Abril Frey RN  7/11/2021

## 2021-07-11 NOTE — PLAN OF CARE
Vital signs stable. Postpartum assessment WDL. Voiding without difficulty. Lung sounds clear and equal. Using tylenol/ibuprofen for pain management. Up ambulating free of dizziness. Hgb 9.0, IV Venofer infused. Working on breastfeeding every 2-3 hours. Encouraged to call with questions/concerns. Will continue to monitor.

## 2021-07-11 NOTE — PROGRESS NOTES
"S: Pt doing well. Infant is being . Pain is well controlled.    O: /66   Pulse 66   Temp 98.4  F (36.9  C) (Oral)   Resp 18   Ht 1.651 m (5' 5\")   Wt 91.6 kg (202 lb)   LMP 10/11/2020   SpO2 100%   Breastfeeding Unknown   BMI 33.61 kg/m          ABD:  Uterine fundus is firm, non-tender and at the level of the umbilicus       INC: clean/dry/intact                Hemoglobin   Date Value Ref Range Status   07/11/2021 9.0 (L) 11.7 - 15.7 g/dL Final            Lab Results   Component Value Date    RH Neg 07/09/2021       A: Post-op Day #2  s/p C/Section     Acute blood loss anemia from surgery    hgb 9.0 this morning     Chronic hypertension history, normal pressures so far, not on meds    P: Continue Post Op Cares      Continue IV venofer     Discharge Monday    "

## 2021-07-12 VITALS
DIASTOLIC BLOOD PRESSURE: 75 MMHG | SYSTOLIC BLOOD PRESSURE: 122 MMHG | WEIGHT: 202 LBS | TEMPERATURE: 97.5 F | RESPIRATION RATE: 16 BRPM | BODY MASS INDEX: 33.66 KG/M2 | HEART RATE: 77 BPM | OXYGEN SATURATION: 100 % | HEIGHT: 65 IN

## 2021-07-12 PROCEDURE — 250N000011 HC RX IP 250 OP 636: Performed by: OBSTETRICS & GYNECOLOGY

## 2021-07-12 PROCEDURE — 258N000003 HC RX IP 258 OP 636: Performed by: OBSTETRICS & GYNECOLOGY

## 2021-07-12 PROCEDURE — 250N000013 HC RX MED GY IP 250 OP 250 PS 637: Performed by: OBSTETRICS & GYNECOLOGY

## 2021-07-12 RX ADMIN — ACETAMINOPHEN 975 MG: 325 TABLET, FILM COATED ORAL at 02:57

## 2021-07-12 RX ADMIN — OXYCODONE HYDROCHLORIDE 5 MG: 5 TABLET ORAL at 13:06

## 2021-07-12 RX ADMIN — OXYCODONE HYDROCHLORIDE 5 MG: 5 TABLET ORAL at 08:51

## 2021-07-12 RX ADMIN — IRON SUCROSE 300 MG: 20 INJECTION, SOLUTION INTRAVENOUS at 08:58

## 2021-07-12 RX ADMIN — SENNOSIDES AND DOCUSATE SODIUM 2 TABLET: 8.6; 5 TABLET ORAL at 08:52

## 2021-07-12 RX ADMIN — OXYCODONE HYDROCHLORIDE 5 MG: 5 TABLET ORAL at 02:57

## 2021-07-12 RX ADMIN — ACETAMINOPHEN 975 MG: 325 TABLET, FILM COATED ORAL at 08:50

## 2021-07-12 RX ADMIN — IBUPROFEN 800 MG: 400 TABLET ORAL at 02:57

## 2021-07-12 RX ADMIN — SIMETHICONE 80 MG: 80 TABLET, CHEWABLE ORAL at 02:58

## 2021-07-12 RX ADMIN — IBUPROFEN 800 MG: 400 TABLET ORAL at 08:51

## 2021-07-12 NOTE — PROGRESS NOTES
"S: Pt doing well. Infant is being . Pain is well controlled.    O: /75 (BP Location: Right arm)   Pulse 77   Temp 97.5  F (36.4  C) (Oral)   Resp 16   Ht 1.651 m (5' 5\")   Wt 91.6 kg (202 lb)   LMP 10/11/2020   SpO2 100%   Breastfeeding Unknown   BMI 33.61 kg/m          ABD:  Uterine fundus is firm, non-tender and at the level of the umbilicus       INC: clean/dry/intact                Hemoglobin   Date Value Ref Range Status   07/11/2021 9.0 (L) 11.7 - 15.7 g/dL Final            Lab Results   Component Value Date    RH Neg 07/09/2021       A: Post-op Day #3 s/p C/Section      Acute blood loss anemia from surgery    P: Continue Post Op Cares      Discharge home       Return 6 wks for postpartum check    "

## 2021-07-12 NOTE — PLAN OF CARE
Discharge instructions reviewed with pt and spouse Jack. All questions answered. Pt verbalizes readiness to go home. Discharge prescriptions filled and given to patient. Pt plans to continue supplementing  with donor milk, the prescription provided by pediatrician. Spectra breast pump given to pt to take home. Hydrogel pads given to pt for nipple tenderness. Pt to follow up with OB in 6 weeks.   Pt fed baby prior to leaving for home. Pt discharged to home brought down to door via wheelchair.

## 2021-07-12 NOTE — PLAN OF CARE
Vital signs stable.Incision well approximated  and intact.Voiding without difficulty. Lung sounds clear and equal. Using tylenol, ibuprofen and oxycodone for pain management. Up ambulating free of dizziness. Breastfeeding every 2-3 hours, pumping. Encouraged to call with questions/concerns. Will continue to monitor.

## 2021-07-12 NOTE — PROGRESS NOTES
Data: Vital signs within normal limits. Postpartum checks within normal limits - see flow record. Patient eating and drinking normally. Patient able to empty bladder independently and is up ambulating. C section incision intact, no drainage or signs of infection present, healing well. Patient performing self cares and is able to care for infant independently.   Action: Patient medicated during the shift for uterine cramping and pain, see MAR. Patient reassessed within 1 hour after each medication and pain was improved - patient stated she was comfortable. Patient education completed, all questions answered. See flow record.  Response: Positive attachment behaviors observed with infant. Support person Jack present.   Plan: Discharge to home. Follow up with OB in 6 weeks.

## 2021-07-12 NOTE — DISCHARGE INSTRUCTIONS

## 2021-07-14 ENCOUNTER — NURSE TRIAGE (OUTPATIENT)
Dept: NURSING | Facility: CLINIC | Age: 41
End: 2021-07-14

## 2021-07-14 LAB — COPATH REPORT: NORMAL

## 2021-07-15 NOTE — TELEPHONE ENCOUNTER
" gave birth via  to second baby.Is calling because she has been feeling her heart skip some beats,  \"inconstently inconstant\".  Symptoms are:    HR 60 (this time was regular rate)    Hgb 9.0 (taling iron)    Ankle swelling is decreasing    Up steps slight SOB    Sometime feels chest tightness    Gets up OOB gets dizzy so sits there a bit to recover    After second miscarriage 2020 she had same symptoms was worked up by cardiology, Dr. Haynes, all testing came back negative.     Incision is clean, dry, and intact. Breat feeding is going well.   If symptoms continue to make an appointment to see cardiology on Monday.    ,Betzaida Bacon RN MAN   Triage Nurse Advisor [facility]     Reason for Disposition    Palpitations    Additional Information    Negative: Passed out (i.e., lost consciousness, collapsed and was not responding)    Negative: Shock suspected (e.g., cold/pale/clammy skin, too weak to stand, low BP, rapid pulse)    Negative: Difficult to awaken or acting confused (e.g., disoriented, slurred speech)    Negative: Unable to walk, or can only walk with assistance (e.g., requires support)    Negative: Visible sweat on face or sweat dripping down face    Negative: [1] Received SHOCK from implantable cardiac defibrillator AND [2] persisting symptoms (i.e., palpitations, lightheadedness)    Negative: [1] Dizziness, lightheadedness, or weakness AND [2] heart beating very rapidly (e.g., > 140 / minute)    Negative: [1] Dizziness, lightheadedness, or weakness AND [2] heart beating very slowly  (e.g., < 50 / minute)    Negative: Sounds like a life-threatening emergency to the triager    Negative: Difficulty breathing    Negative: Dizziness, lightheadedness, or weakness    Negative: [1] Heart beating very rapidly (e.g., > 140 / minute) AND [2] present now  (Exception: during exercise)    Negative: Heart beating very slowly (e.g., < 50 / minute)  (Exception: athlete)    Negative: New or worsened " "shortness of breath with activity (dyspnea on exertion)    Negative: Patient sounds very sick or weak to the triager    Negative: [1] Heart beating very rapidly (e.g., > 140 / minute) AND [2] not present now  (Exception: during exercise)    Negative: [1] Skipped or extra beat(s) AND [2] increases with exercise or exertion    Negative: [1] Skipped or extra beat(s) AND [2] occurs 4 or more times per minute    Negative: New or worsened ankle swelling    Negative: History of heart disease  (i.e., heart attack, bypass surgery, angina, angioplasty, CHF) (Exception: brief heart beat symptoms that went away and now feels well)    Negative: Age > 60 years (Exception: brief heart beat symptoms that went away and now feels well)    Negative: Taking water pill (i.e., diuretic) or heart medication (e.g., digoxin)    Negative: Wearing a \"Holter monitor\" or \"cardiac event monitor\"    Negative: [1] Received SHOCK from implantable cardiac defibrillator AND [2] now feels well    Negative: History of hyperthyroidism or taking thyroid medication    Negative: Known or suspected substance abuse (e.g., cocaine, alcohol abuse)    Negative: [1] ADHD AND [2] taking stimulant medication    Negative: [1] Palpitations AND [2] no improvement after using CARE ADVICE    Negative: Problems with anxiety or stress    Negative: Palpitations are a chronic symptom (recurrent or ongoing AND present > 4 weeks)    Negative: [1] Skipped or extra beat(s) AND [2] occurs < 4 times / minute    Protocols used: HEART RATE AND HEARTBEAT [id number]      "

## 2021-08-02 ENCOUNTER — TELEPHONE (OUTPATIENT)
Dept: OBGYN | Facility: CLINIC | Age: 41
End: 2021-08-02

## 2021-08-02 NOTE — TELEPHONE ENCOUNTER
C/S on 21   Concerns regarding incision site after  section.   Noticed at the edge  of incision can see sutures coming through and this has caused area to be uncomfortable. Not painful but more sensitive. Can see area is slightly reddened.Denies drainage or pus.   Skin around is moist but most likely from sweat.   No odor. Denies fever and otherwise is feeling well. Does wear a abdominal binder is has to go outside however will take off at home.     Patient will send through a mychart picture. Discussed keeping area dry and clean. Can use wash cloth with mild soap around outside not directly over incision. Can use gauze to wipe of sweat. Discussed wearing yoga pants to help lift fold of skin above  incision off of incision site because can put more pressure on site without yoga pants or binder.     If any increased pain, any discharge, fevers or any further concerns,  wound dehiscence then needs to be seen right away.   Patient verbalized understanding and had no further questions.      Nabila Luna RN

## 2021-08-03 ENCOUNTER — MYC MEDICAL ADVICE (OUTPATIENT)
Dept: OBGYN | Facility: CLINIC | Age: 41
End: 2021-08-03

## 2021-08-04 NOTE — TELEPHONE ENCOUNTER
21 telephone encounter:  C/S on 21   Concerns regarding incision site after  section.   Noticed at the edge  of incision can see sutures coming through and this has caused area to be uncomfortable. Not painful but more sensitive. Can see area is slightly reddened.Denies drainage or pus.   Skin around is moist but most likely from sweat.   No odor. Denies fever and otherwise is feeling well. Does wear a abdominal binder is has to go outside however will take off at home.      Patient will send through a mychart picture. Discussed keeping area dry and clean. Can use wash cloth with mild soap around outside not directly over incision. Can use gauze to wipe of sweat. Discussed wearing yoga pants to help lift fold of skin above  incision off of incision site because can put more pressure on site without yoga pants or binder.      If any increased pain, any discharge, fevers or any further concerns,  wound dehiscence then needs to be seen right away.   Patient verbalized understanding and had no further questions.          Routing to  to review and advise on C/S incision  Nabila Luna RN

## 2021-08-06 ENCOUNTER — OFFICE VISIT (OUTPATIENT)
Dept: OBGYN | Facility: CLINIC | Age: 41
End: 2021-08-06
Payer: COMMERCIAL

## 2021-08-06 VITALS
SYSTOLIC BLOOD PRESSURE: 104 MMHG | HEIGHT: 65 IN | HEART RATE: 62 BPM | WEIGHT: 180 LBS | DIASTOLIC BLOOD PRESSURE: 58 MMHG | BODY MASS INDEX: 29.99 KG/M2

## 2021-08-06 DIAGNOSIS — Z98.891 S/P C-SECTION: Primary | ICD-10-CM

## 2021-08-06 PROCEDURE — 99024 POSTOP FOLLOW-UP VISIT: CPT | Performed by: OBSTETRICS & GYNECOLOGY

## 2021-08-06 ASSESSMENT — MIFFLIN-ST. JEOR: SCORE: 1482.35

## 2021-08-06 NOTE — PROGRESS NOTES
SUBJECTIVE:                                                   An Roman is a 40 year old female who presents to clinic today for the following health issue(s):  Patient presents with:  Wound Check      Additional information:  21 with Dr. Lozano    HPI:  Noticed redness on right lateral end of incision within last couple days. No pain, no fever. Small discharge from the incision, no odor.     Patient's last menstrual period was 10/11/2020..     Patient is not sexually active, .  Using none for contraception.    reports that she quit smoking about 4 years ago. She quit after 10.00 years of use. She has quit using smokeless tobacco.    STD testing offered?  Declined    Health maintenance updated:  yes    Today's PHQ-2 Score:   PHQ-2 (  [employer]) 2021   Q1: Little interest or pleasure in doing things 0   Q2: Feeling down, depressed or hopeless 0   PHQ-2 Score 0   PHQ-2 Total Score (12-17 Years)- Positive if 3 or more points; Administer PHQ-A if positive 0   Q1: Little interest or pleasure in doing things -   Q2: Feeling down, depressed or hopeless -   PHQ-2 Score -     Today's PHQ-9 Score:   PHQ-9 SCORE 2021   PHQ-9 Total Score MyChart -   PHQ-9 Total Score 0     Today's ZACKERY-7 Score:   ZACKERY-7 SCORE 2021   Total Score -   Total Score 0       Problem list and histories reviewed & adjusted, as indicated.  Additional history: as documented.    Patient Active Problem List   Diagnosis     Exercise-induced asthma     Dry skin     Eczema     History of  section complicating pregnancy     AMA (advanced maternal age) multigravida 35+, third trimester     Past Surgical History:   Procedure Laterality Date     BREAST SURGERY  [id number]    Breast Augmentatation, saline implants.      SECTION N/A 2017    Procedure:  SECTION;;  Surgeon: Caryn Lozano MD;  Location:  L[facility]      SECTION N/A 2021    Procedure: REPEAT  SECTION,;   Surgeon: Caryn Lozano MD;  Location:  [facility]     CYSTECTOMY OVARIAN BENIGN N/A 2021    Procedure: EXCISION OF OVARIAN CYST;  Surgeon: Caryn Lozano MD;  Location:  L[facility]     ENT SURGERY      [place] teeth removed.      Social History     Tobacco Use     Smoking status: Former Smoker     Years: 10.00     Quit date: 3/5/2017     Years since quittin.7     Smokeless tobacco: Former User     Tobacco comment: Social smoker, has not smoked since pregnancy   Substance Use Topics     Alcohol use: Not Currently       as of 21     Problem (# of Occurrences) Relation (Name,Age of Onset)    Cerebrovascular Disease (3) Paternal Grandmother, Other (Maternal Grandmother), Maternal Uncle    Colon Cancer (1) Maternal Grandmother    Diabetes (1) Father    Heart Disease (1) Maternal Uncle    Hyperlipidemia (1) Father    Hypertension (1) Mother    Myocardial Infarction (3) Maternal Grandfather, Paternal Grandfather, Maternal Uncle    Other - See Comments (1) Mother: Neck tumor-was removed and was benign.    Ovarian Cancer (1) Mother: uncertain if mother had ovarian cancer or suspicious mass, ovaries removed in early 30s.            Current Outpatient Medications   Medication Sig     Cholecalciferol (VITAMIN D3) 25 MCG (1000 UT) CAPS Take 2,000 Int'l Units by mouth daily     ferrous fumarate-vitamin C ER (IBRAHIMA-SEQUELS) 65-25 MG CR tablet Take 1 tablet by mouth daily (with breakfast) (Patient not taking: Reported on 2021)     Prenatal Vit-Fe Fumarate-FA (PRENATAL MULTIVITAMIN  PLUS IRON) 27-0.8 MG TABS per tablet Take 1 tablet by mouth daily     norethindrone (MICRONOR) 0.35 MG tablet Take 1 tablet (0.35 mg) by mouth daily     No current facility-administered medications for this visit.     Allergies   Allergen Reactions     Nkda [No Known Drug Allergies]        ROS:  12 point review of systems negative other than symptoms noted below or in the HPI.        OBJECTIVE:     /58   Pulse 62   Ht 1.651 m  "(5' 5\")   Wt 81.6 kg (180 lb)   LMP 10/11/2020   Breastfeeding Yes   BMI 29.95 kg/m    Body mass index is 29.95 kg/m .    Exam:  Constitutional:  Appearance: Well nourished, well developed alert, in no acute distress  Gastrointestinal:  Abdominal Examination:  Mild approp periincisional ttp, incision intact, no signs cellulitis. tone normal without rigidity or guarding, no masses present, umbilicus without lesions; Liver/Spleen:  No hepatomegaly present, liver nontender to palpation; Hernias:  No hernias present  Skin: General Inspection:  No rashes present, no lesions present, no areas of discoloration.  Neurologic:  Mental Status:  Oriented X3.  Normal strength and tone, sensory exam grossly normal, mentation intact and speech normal.    Psychiatric:  Mentation appears normal and affect normal/bright.         ASSESSMENT/PLAN:                                                        ICD-10-CM    1. S/P   Z98.891        No signs cellulitis. Continue routine incision care. Discussed warning signs. F/U for routine PP visit, sooner as needed.     Aide De La Rosa Masters, DO  [facility] FOR [facility] [place]  "

## 2021-08-07 NOTE — DISCHARGE SUMMARY
DISCHARGE SUMMARY      An Roman  1980      Admission date:  2021  Discharge date: 21    Admission diagnosis:   - Pregnancy at 38w5d  - Advanced maternal age  - 2 prior c sections  -right paratubal cyst  - chronic hypertension, well controlled without meds during pregnancy    Discharge diagnosis:   - Pregnancy at 38w5d  - Status post repeat c section delivery and removal of right paratubal cyst  - acute blood loss anemia secondary to intraoperative blood loss, treated with iv iron during admission  - chronic hypertension, not on meds    Reason for Admission:   This is a 40 year old  38w5d who presented to Labor and Delivery and was admitted for repeat c section.  The patient's pregnancy had been  Followed with growth ultrasounds and weekly bpp testing since 32 weeks, on daily baby ASA during pregnancy    Hospital Course:   Following admission, hospital course: complicated by anemia, treated with iv iron.  Delivery occurred by repeat c section low transverse and she had a male infant with APGARs of 8/9  and weight of 9lb. Baby's hospital course was uncomplicated.      Discharged home on POD # 3      Postpartum hemoglobin: 9.0          Discharge Medications:       Review of your medicines      START taking      Dose / Directions   ferrous fumarate-vitamin C ER 65-25 MG CR tablet  Commonly known as: IBRAHIMA-SEQUELS  Used for: History of  section complicating pregnancy      Dose: 1 tablet  Take 1 tablet by mouth daily (with breakfast)  Quantity: 30 tablet  Refills: 0        CONTINUE these medicines which have NOT CHANGED      Dose / Directions   prenatal multivitamin w/iron 27-0.8 MG tablet      Dose: 1 tablet  Take 1 tablet by mouth daily  Refills: 0     vitamin D3 25 MCG (1000 UT) Caps      Dose: 2,000 Int'l Units  Take 2,000 Int'l Units by mouth daily  Refills: 0           Where to get your medicines      Some of these will need a paper prescription and others can be bought  over the counter. Ask your nurse if you have questions.    Bring a paper prescription for each of these medications    ferrous fumarate-vitamin C ER 65-25 MG CR tablet         Patient Instructions:  Activity: the patient was instructed to have pelvic rest for 6 weeks.  Nothing in the vagina. No tampons, douching, or intercourse.  No driving while on narcotics.  Activity restrictions - no driving for 1 week.  She should notify her physician with temperature greater than 100.4 degrees, and if she is having any brisk vaginal bleeding where she soaks through greater than 1 pad per hour, if any area on her breast becomes red or painful, or if her pain is no longer controlled by pain medications.  Diet as tolerated.  Discussed symptoms of post-partum blues and depression and urged her to contact us immediately should that become a concern.    Follow-up with me in 6 weeks for a postpartum visit.    Caryn Lozano MD

## 2021-09-03 NOTE — PROGRESS NOTES
Please see full imaging report from [facility] program under imaging tab.    Tristan Mcdonald MD  Maternal Fetal Medicine       The patient is a 29y Female complaining of pain, ankle.

## 2021-09-04 ENCOUNTER — HEALTH MAINTENANCE LETTER (OUTPATIENT)
Age: 41
End: 2021-09-04

## 2021-09-23 ENCOUNTER — PRENATAL OFFICE VISIT (OUTPATIENT)
Dept: OBGYN | Facility: CLINIC | Age: 41
End: 2021-09-23
Payer: COMMERCIAL

## 2021-09-23 VITALS
WEIGHT: 172 LBS | SYSTOLIC BLOOD PRESSURE: 118 MMHG | BODY MASS INDEX: 28.66 KG/M2 | HEIGHT: 65 IN | HEART RATE: 77 BPM | DIASTOLIC BLOOD PRESSURE: 74 MMHG

## 2021-09-23 DIAGNOSIS — Z30.011 OCP (ORAL CONTRACEPTIVE PILLS) INITIATION: ICD-10-CM

## 2021-09-23 DIAGNOSIS — Z12.4 SCREENING FOR CERVICAL CANCER: ICD-10-CM

## 2021-09-23 PROBLEM — O09.91 SUPERVISION OF HIGH RISK PREGNANCY IN FIRST TRIMESTER: Status: RESOLVED | Noted: 2020-12-01 | Resolved: 2021-09-23

## 2021-09-23 PROCEDURE — 99207 PR POST PARTUM EXAM: CPT | Performed by: OBSTETRICS & GYNECOLOGY

## 2021-09-23 PROCEDURE — 87624 HPV HI-RISK TYP POOLED RSLT: CPT | Performed by: OBSTETRICS & GYNECOLOGY

## 2021-09-23 PROCEDURE — G0145 SCR C/V CYTO,THINLAYER,RESCR: HCPCS | Performed by: OBSTETRICS & GYNECOLOGY

## 2021-09-23 RX ORDER — ACETAMINOPHEN AND CODEINE PHOSPHATE 120; 12 MG/5ML; MG/5ML
0.35 SOLUTION ORAL DAILY
Qty: 90 TABLET | Refills: 3 | Status: SHIPPED | OUTPATIENT
Start: 2021-09-23

## 2021-09-23 ASSESSMENT — ANXIETY QUESTIONNAIRES
7. FEELING AFRAID AS IF SOMETHING AWFUL MIGHT HAPPEN: NOT AT ALL
1. FEELING NERVOUS, ANXIOUS, OR ON EDGE: NOT AT ALL
IF YOU CHECKED OFF ANY PROBLEMS ON THIS QUESTIONNAIRE, HOW DIFFICULT HAVE THESE PROBLEMS MADE IT FOR YOU TO DO YOUR WORK, TAKE CARE OF THINGS AT HOME, OR GET ALONG WITH OTHER PEOPLE: NOT DIFFICULT AT ALL
5. BEING SO RESTLESS THAT IT IS HARD TO SIT STILL: NOT AT ALL
6. BECOMING EASILY ANNOYED OR IRRITABLE: NOT AT ALL
3. WORRYING TOO MUCH ABOUT DIFFERENT THINGS: NOT AT ALL
GAD7 TOTAL SCORE: 0
2. NOT BEING ABLE TO STOP OR CONTROL WORRYING: NOT AT ALL

## 2021-09-23 ASSESSMENT — PATIENT HEALTH QUESTIONNAIRE - PHQ9
SUM OF ALL RESPONSES TO PHQ QUESTIONS 1-9: 0
5. POOR APPETITE OR OVEREATING: NOT AT ALL

## 2021-09-23 ASSESSMENT — MIFFLIN-ST. JEOR: SCORE: 1446.07

## 2021-09-23 NOTE — PROGRESS NOTES
"  SUBJECTIVE:  An Roman,  is here for a postpartum visit.  She had a  Section  on 2021 delivering a healthy baby boy, named norm weighing 8 lbs 15.9 oz at term.      HPI:  Had second c section   wants vasectomy  No concerns  Prescription for micronor till his procedure done  Due to check pap today    Last PHQ-9 score on record=   PHQ-9 SCORE 2020   PHQ-9 Total Score MyChart -   PHQ-9 Total Score 3     ZACKERY-7 SCORE 2017   Total Score - 0 (minimal anxiety) -   Total Score 0 0 1       Pap: (  Lab Results   Component Value Date    PAP  2015     PAP: Negative for Intraepithelial Lesion or Malignancy; HPV: Negative       Delivery complications:  No  Breast feeding:  Yes,   Bladder problems:  No  Bowel problems/hemorrhoids:  No  Episiotomy/laceration/incision healed? No  Vaginal flow:  None  [place]:  No  Contraception: none  Emotional adjustment:  doing well and happy  Back to work:  2021    12 point review of systems negative other than symptoms noted below or in the HPI.    OBJECTIVE:  Vitals: /74   Pulse 77   Ht 1.651 m (5' 5\")   Wt 78 kg (172 lb)   LMP 10/11/2020   BMI 28.62 kg/m    BMI= Body mass index is 28.62 kg/m .  General - pleasant female in no acute distress.  Breast -  deferred  Abdomen - Incision well-healed  Pelvic - EG: normal adult female, BUS: within normal limits, Vagina: well rugated, no discharge, Cervix: no lesions or CMT, Uterus: firm, normal sized and nontender, midplane in position. Adnexae: no masses or tenderness.  Rectovaginal - deferred.    ASSESSMENT:    ICD-10-CM    1. Screening for cervical cancer  Z12.4        PLAN:  May resume normal activities without restrictions.  Pap smear was done today.    Full counseling was provided, and all questions were answered.   Return to clinic in one year for an annual visit.   Prescription for micronor sent  There are no Patient Instructions on file for this " visit.  Caryn Lozano MD

## 2021-09-24 ASSESSMENT — ANXIETY QUESTIONNAIRES: GAD7 TOTAL SCORE: 0

## 2021-09-28 LAB
BKR LAB AP GYN ADEQUACY: NORMAL
BKR LAB AP GYN INTERPRETATION: NORMAL
BKR LAB AP HPV REFLEX: NORMAL
BKR LAB AP PREVIOUS ABNORMAL: NORMAL
PATH REPORT.COMMENTS IMP SPEC: NORMAL
PATH REPORT.RELEVANT HX SPEC: NORMAL

## 2021-09-30 LAB
HUMAN PAPILLOMA VIRUS 16 DNA: NEGATIVE
HUMAN PAPILLOMA VIRUS 18 DNA: NEGATIVE
HUMAN PAPILLOMA VIRUS FINAL DIAGNOSIS: NORMAL
HUMAN PAPILLOMA VIRUS OTHER HR: NEGATIVE

## 2021-10-28 ENCOUNTER — MEDICAL CORRESPONDENCE (OUTPATIENT)
Dept: HEALTH INFORMATION MANAGEMENT | Facility: CLINIC | Age: 41
End: 2021-10-28
Payer: COMMERCIAL

## 2022-09-09 ENCOUNTER — OFFICE VISIT (OUTPATIENT)
Dept: MIDWIFE SERVICES | Facility: CLINIC | Age: 42
End: 2022-09-09
Payer: COMMERCIAL

## 2022-09-09 VITALS — SYSTOLIC BLOOD PRESSURE: 100 MMHG | DIASTOLIC BLOOD PRESSURE: 70 MMHG

## 2022-09-09 DIAGNOSIS — N89.8 VAGINAL DISCHARGE: ICD-10-CM

## 2022-09-09 DIAGNOSIS — N30.00 ACUTE CYSTITIS WITHOUT HEMATURIA: ICD-10-CM

## 2022-09-09 DIAGNOSIS — R35.0 URINE FREQUENCY: Primary | ICD-10-CM

## 2022-09-09 LAB
ALBUMIN UR-MCNC: NEGATIVE MG/DL
APPEARANCE UR: CLEAR
BACTERIA #/AREA URNS HPF: ABNORMAL /HPF
BILIRUB UR QL STRIP: NEGATIVE
COLOR UR AUTO: YELLOW
GLUCOSE UR STRIP-MCNC: NEGATIVE MG/DL
HGB UR QL STRIP: ABNORMAL
KETONES UR STRIP-MCNC: NEGATIVE MG/DL
LEUKOCYTE ESTERASE UR QL STRIP: ABNORMAL
NITRATE UR QL: NEGATIVE
PH UR STRIP: 5.5 [PH] (ref 5–7)
RBC #/AREA URNS AUTO: ABNORMAL /HPF
SP GR UR STRIP: 1.02 (ref 1–1.03)
SQUAMOUS #/AREA URNS AUTO: ABNORMAL /LPF
UROBILINOGEN UR STRIP-ACNC: 0.2 E.U./DL
WBC #/AREA URNS AUTO: ABNORMAL /HPF

## 2022-09-09 PROCEDURE — 87086 URINE CULTURE/COLONY COUNT: CPT | Performed by: ADVANCED PRACTICE MIDWIFE

## 2022-09-09 PROCEDURE — 81001 URINALYSIS AUTO W/SCOPE: CPT | Performed by: ADVANCED PRACTICE MIDWIFE

## 2022-09-09 PROCEDURE — 87186 SC STD MICRODIL/AGAR DIL: CPT | Performed by: ADVANCED PRACTICE MIDWIFE

## 2022-09-09 PROCEDURE — 99214 OFFICE O/P EST MOD 30 MIN: CPT | Performed by: ADVANCED PRACTICE MIDWIFE

## 2022-09-09 RX ORDER — NITROFURANTOIN 25; 75 MG/1; MG/1
100 CAPSULE ORAL 2 TIMES DAILY
Qty: 10 CAPSULE | Refills: 0 | Status: SHIPPED | OUTPATIENT
Start: 2022-09-09 | End: 2022-09-14

## 2022-09-09 NOTE — PROGRESS NOTES
"SUBJECTIVE: An Roman is a 41 year old female who presents today with UTI symptoms: Reports symptoms of frequency and urgency started \"a few days ago,\" also noted small amount of pink/red blood in urine. Feels like when she empties her bladder only small amounts of urine come out and she still has to urinate. Symptoms haven't improved and she is certain this is a UTI    URINARY TRACT SYMPTOMS     Onset: \"a few days ago\"    Description:   Painful urination (Dysuria): No  Blood in urine (Hematuria): YesUrgency/Frequency: Yes    Progression of Symptoms:  worsening    Accompanying Signs & Symptoms:  Fever/chills: No  Flank pain: No  Nausea and vomiting: No  Any vaginal symptoms: No  Abdominal/Pelvic Pain: No   History:   History of frequent UTI's: Yes  History of kidney stones: No  Sexually Active: Yes  Possibility of pregnancy: No  Contraceptive type:  condoms    Precipitating factors: none           Therapies Tried and outcome: increase fluid intake      Patient Active Problem List   Diagnosis     Exercise-induced asthma     Dry skin     Eczema     History of  section complicating pregnancy     AMA (advanced maternal age) multigravida 35+, third trimester     Past Medical History:   Diagnosis Date     Exercise induced bronchospasm      Hypertension     HTN hx prior to preg     Pyelonephritis     While in [place]     Uncomplicated asthma     Exercise induced asthma, has an emergency inhaler is needed, hasn't used in many years.     Past Surgical History:   Procedure Laterality Date     BREAST SURGERY  [id number]    Breast Augmentatation, saline implants.      SECTION N/A 2017    Procedure:  SECTION;;  Surgeon: Caryn Lozano MD;  Location:  [facility]      SECTION N/A 2021    Procedure: REPEAT  SECTION,;  Surgeon: Caryn Lozano MD;  Location:  [facility]     CYSTECTOMY OVARIAN BENIGN N/A 2021    Procedure: EXCISION OF OVARIAN CYST;  Surgeon: Caryn Lozano, " MD;  Location:  [facility]     ENT SURGERY  2000    [place] teeth removed.     Current Outpatient Medications   Medication Sig Dispense Refill     Cholecalciferol (VITAMIN D3) 25 MCG (1000 UT) CAPS Take 2,000 Int'l Units by mouth daily       nitroFURantoin macrocrystal-monohydrate (MACROBID) 100 MG capsule Take 1 capsule (100 mg) by mouth 2 times daily for 5 days 10 capsule 0     Pediatric Multivitamins-Fl (MULTIVITAMIN WITH 1 MG FLUORIDE) 1 MG CHEW Take 1 tablet by mouth daily       ferrous fumarate-vitamin C ER (IBRAHIMA-SEQUELS) 65-25 MG CR tablet Take 1 tablet by mouth daily (with breakfast) (Patient not taking: Reported on 9/9/2022) 30 tablet 0     norethindrone (MICRONOR) 0.35 MG tablet Take 1 tablet (0.35 mg) by mouth daily (Patient not taking: Reported on 9/9/2022) 90 tablet 3     Prenatal Vit-Fe Fumarate-FA (PRENATAL MULTIVITAMIN  PLUS IRON) 27-0.8 MG TABS per tablet Take 1 tablet by mouth daily (Patient not taking: Reported on 9/9/2022)       Allergies   Allergen Reactions     Nkda [No Known Drug Allergies]        Health maintenance updated:  no    ROS:  12 point review of systems negative other than symptoms noted below or in the HPI.  Genitourinary: Urgency and frequency     PHYSICAL EXAM:  Vitals: /70   LMP 08/08/2022   Breastfeeding No   BMI= There is no height or weight on file to calculate BMI.  Patient appears well, afebrile.   ABD: Soft without supra pubic pain or tenderness  BACK: Neg CVAT.     ASSESSMENT/PLAN:      ICD-10-CM    1. Urine frequency  R35.0 Urine Culture     UA with Microscopic - lab collect     Urine Culture     UA with Microscopic - lab collect     Urine Microscopic Exam   2. Vaginal discharge  N89.8 CANCELED: Wet prep - Clinic Collect     CANCELED: Yeast culture   3. Acute cystitis without hematuria  N30.00 nitroFURantoin macrocrystal-monohydrate (MACROBID) 100 MG capsule     Results for orders placed or performed in visit on 09/09/22   UA with Microscopic - lab collect     Status:  Abnormal   Result Value Ref Range    Color Urine Yellow Colorless, Straw, Light Yellow, Yellow    Appearance Urine Clear Clear    Glucose Urine Negative Negative mg/dL    Bilirubin Urine Negative Negative    Ketones Urine Negative Negative mg/dL    Specific Gravity Urine 1.025 1.003 - 1.035    Blood Urine Large (A) Negative    pH Urine 5.5 5.0 - 7.0    Protein Albumin Urine Negative Negative mg/dL    Urobilinogen Urine 0.2 0.2, 1.0 E.U./dL    Nitrite Urine Negative Negative    Leukocyte Esterase Urine Small (A) Negative   Urine Microscopic Exam     Status: Abnormal   Result Value Ref Range    Bacteria Urine Moderate (A) None Seen /HPF    RBC Urine 10-25 (A) 0-2 /HPF /HPF    WBC Urine 25-50 (A) 0-5 /HPF /HPF    Squamous Epithelials Urine Few (A) None Seen /LPF        Urine was sent for culture.     COUNSELING:  Push fluids    Will start Macrobid based on clinical presentation and suspicious UA. Will notify of UC results and adjust treatment if needed   May use Uristat or other OTC med for dysuria  Reinforced the importance of completing this course of antibiotics   Use a probiotic when taking antibiotics  Handout provided regarding UTI prevention  RTC with continued symptoms or concerns.    YUNIEL Uriostegui, CNM

## 2022-09-09 NOTE — PATIENT INSTRUCTIONS
Bladder Infection (UTI'S)    To help reduce the symptoms of your bladder infection:     Drink 8-10 glasses of water every day    Decrease caffeine, sugar and alcohol    Take all of the medication as it has been prescribed, don't stop before the last dose is gone    You may use OTC Uristat or Pyridium (this can turn your urine orange)  to soothe your bladder and reduce the burning but be aware that it may stain your clothing    Take Ibuprofen as directed for pain (not in pregnancy)    Take Vitamin C:  250-500 mg a day, Zinc: 30-50 mg day    Cranactin (tableted cranberry juice concentrate) take 1-2 tablets every 3-4 hours with plenty of water        To prevent future urinary tract infections:    AVOID CHEMICAL IRRITANTS:  Bath gels, perfumed products, deodorant pads or tampons, douching    CLOTHING: That increases moisture and bacterial growth:  nylon, Lycra, panty liners, tight clothing and thong underwear      ACIDIFY URINE: Cranberry tablets (Cranactin) or juice (naturally sweetened) to acidify urine and decrease bacterial growth.     URINATE:  Frequently and before and after intercourse.    If bladder infections are a common problem for you, consider washing your vaginal area before intercourse as well.       If symptoms persist or you experience fever, chills or back pain, please call:    [facility]  [phone]

## 2022-09-10 NOTE — RESULT ENCOUNTER NOTE
UC positive, already started of Macrobid. Awaiting final UC results/susceptibility testing and will notify pt if change in antibiotic is necessary    YUNIEL Uriostegui, JOHNSON

## 2022-09-11 LAB — BACTERIA UR CULT: ABNORMAL

## 2022-10-16 ENCOUNTER — HEALTH MAINTENANCE LETTER (OUTPATIENT)
Age: 42
End: 2022-10-16

## 2022-12-04 ENCOUNTER — HEALTH MAINTENANCE LETTER (OUTPATIENT)
Age: 42
End: 2022-12-04

## 2024-01-13 ENCOUNTER — HEALTH MAINTENANCE LETTER (OUTPATIENT)
Age: 44
End: 2024-01-13

## 2024-03-23 ENCOUNTER — HEALTH MAINTENANCE LETTER (OUTPATIENT)
Age: 44
End: 2024-03-23

## 2025-01-20 NOTE — DISCHARGE INSTRUCTIONS

## 2025-01-26 ENCOUNTER — HEALTH MAINTENANCE LETTER (OUTPATIENT)
Age: 45
End: 2025-01-26

## 2025-03-19 ENCOUNTER — OFFICE VISIT (OUTPATIENT)
Dept: OBGYN | Facility: CLINIC | Age: 45
End: 2025-03-19
Payer: COMMERCIAL

## 2025-03-19 VITALS — SYSTOLIC BLOOD PRESSURE: 98 MMHG | BODY MASS INDEX: 24.36 KG/M2 | DIASTOLIC BLOOD PRESSURE: 62 MMHG | WEIGHT: 146.4 LBS

## 2025-03-19 DIAGNOSIS — N64.4 CYCLICAL BREAST PAIN: Primary | ICD-10-CM

## 2025-03-19 DIAGNOSIS — Z12.11 COLON CANCER SCREENING: ICD-10-CM

## 2025-03-19 DIAGNOSIS — Z12.31 ENCOUNTER FOR SCREENING MAMMOGRAM FOR BREAST CANCER: ICD-10-CM

## 2025-03-19 RX ORDER — MULTIVITAMIN
1 TABLET ORAL DAILY
COMMUNITY

## 2025-03-19 NOTE — PATIENT INSTRUCTIONS
Breast pain  Breast pain is common. Up to 70% of women will experience breast pain in their lifetime.   Breast pain is most often related to normal physiologic change (hormonal fluctuations) and is a rare symptom of breast cancer.   Management of breast pain  Evaluation with imaging is important to determine if the pain is due to normal physiologic changes related to hormonal fluctuations or to a pathologic process.   Physical support  Wear a supportive Bra that is professionally fit and sized. Be sure your bra is not overly compressive or restrictive.   Wear a sports bra when exercising.   Wear comfortable breast support while sleeping.   Improving omega 3 fatty acid intake may improve breast pain. Eat 1-2 tablespoons of flaxseed daily.   Plant-based diets may help with breast pain.  Breast massage. (see handout)   Warm compresses or ice packs can be used  Acupuncture may help with breast pain.   [facility]   Consider eliminating or decrease caffeine (chocolate, tea, coffee, soda) for a two week period of time to see if pain improves/resolves  Evening Primrose Oil starting with 500 mg/day. May increase to 1000 mg 3 times daily for 6 months. Can be tried.   Chasteberry 400-500 mg/day can be tried.   If you are on hormone based medications adjusting or decreasing these medications may help.   If needed over the counter analgesics such as acetaminophen, ibuprofen, or topical diclofenac cream can intermittently be used.

## 2025-03-19 NOTE — PROGRESS NOTES
SUBJECTIVE:                                                   An Roman is a 44 year old female who presents to clinic today for the following health issue(s):  Patient presents with:  Breast Problem: Patient reports infrequent bilateral breast discomfort that started 3 months ago, patients states she noticed the discomfort around the time her period should start. Patient has implants and has had them since her mid twenties. Patient does have history of breast lumps prior to her implants that were biopsied and results came back normal.       HPI:  An here today for breast concern. Has had bilateral diffuse breast tenderness for the past 3 months. Soreness is a few days prior to period. Denies breast pain other times of the month, is always cyclical. Denies rashes, lumps, bumps, nipple changes, nipple discharge, VMS.     Due for colonoscopy.  Has not had breast screening in years due to bad experience.     Patient's last menstrual period was 2025 (approximate)..     Patient is sexually active, .  Using none for contraception.    reports that she quit smoking about 8 years ago. Her smoking use included cigarettes. She started smoking about 18 years ago. She has been exposed to tobacco smoke. She has quit using smokeless tobacco.  STD testing offered?  Declined    Health maintenance updated:  yes    Today's PHQ-2 Score:       3/19/2025     9:21 AM   PHQ-2 (  [employer])   Q1: Little interest or pleasure in doing things 0   Q2: Feeling down, depressed or hopeless 0   PHQ-2 Score 0    Q1: Little interest or pleasure in doing things Not at all   Q2: Feeling down, depressed or hopeless Not at all   PHQ-2 Score 0       Patient-reported     Today's PHQ-9 Score:       2021     3:15 PM   PHQ-9 SCORE   PHQ-9 Total Score 0     Today's ZACKERY-7 Score:       2021     3:15 PM   ZACKERY-7 SCORE   Total Score 0       Problem list and histories reviewed & adjusted, as indicated.  Additional history:  as documented.    Patient Active Problem List   Diagnosis    Exercise-induced asthma    Dry skin    Eczema    History of  section complicating pregnancy    AMA (advanced maternal age) multigravida 35+, third trimester     Past Surgical History:   Procedure Laterality Date    BREAST SURGERY  [id number]    Breast Augmentatation, saline implants.     SECTION N/A 2017    Procedure:  SECTION;;  Surgeon: Caryn Lozano MD;  Location:  [facility]     SECTION N/A 2021    Procedure: REPEAT  SECTION,;  Surgeon: Caryn Lozano MD;  Location:  [facility]    CYSTECTOMY OVARIAN BENIGN N/A 2021    Procedure: EXCISION OF OVARIAN CYST;  Surgeon: Caryn Lozano MD;  Location:  [facility]    ENT SURGERY      [place] teeth removed.      Social History     Tobacco Use    Smoking status: Former     Current packs/day: 0.00     Types: Cigarettes     Start date: 3/5/2007     Quit date: 3/5/2017     Years since quittin.0     Passive exposure: Past    Smokeless tobacco: Former    Tobacco comments:     Social smoker, has not smoked since pregnancy   Substance Use Topics    Alcohol use: Not Currently      Problem (# of Occurrences) Relation (Name,Age of Onset)    Diabetes (1) Father    Heart Disease (1) Maternal Uncle    Hypertension (1) Mother    Cerebrovascular Disease (3) Paternal Grandmother, Other (Maternal Grandmother), Maternal Uncle    Myocardial Infarction (3) Maternal Grandfather, Paternal Grandfather, Maternal Uncle    Hyperlipidemia (1) Father    Other - See Comments (1) Mother: Neck tumor-was removed and was benign.    Ovarian Cancer (1) Mother: uncertain if mother had ovarian cancer or suspicious mass, ovaries removed in early 30s.    Colon Cancer (1) Maternal Grandmother              Current Outpatient Medications   Medication Sig Dispense Refill    Cholecalciferol (VITAMIN D3) 25 MCG (1000 UT) CAPS Take 2,000 Int'l Units by mouth daily      multivitamin w/minerals  (MULTI-VITAMIN) tablet Take 1 tablet by mouth daily.      ferrous fumarate-vitamin C ER (IBRAHIMA-SEQUELS) 65-25 MG CR tablet Take 1 tablet by mouth daily (with breakfast) (Patient not taking: Reported on 9/9/2022) 30 tablet 0    norethindrone (MICRONOR) 0.35 MG tablet Take 1 tablet (0.35 mg) by mouth daily (Patient not taking: Reported on 9/9/2022) 90 tablet 3    Pediatric Multivitamins-Fl (MULTIVITAMIN WITH 1 MG FLUORIDE) 1 MG CHEW Take 1 tablet by mouth daily      Prenatal Vit-Fe Fumarate-FA (PRENATAL MULTIVITAMIN  PLUS IRON) 27-0.8 MG TABS per tablet Take 1 tablet by mouth daily. (Patient not taking: Reported on 3/19/2025)       No current facility-administered medications for this visit.     Allergies   Allergen Reactions    Nkda [No Known Drug Allergy]          OBJECTIVE:     BP 98/62 (BP Location: Right arm, Patient Position: Sitting, Cuff Size: Adult Small)   Wt 66.4 kg (146 lb 6.4 oz)   LMP 02/20/2025 (Approximate)   BMI 24.36 kg/m    Body mass index is 24.36 kg/m .    Exam:  Constitutional:  Appearance: Well nourished, well developed alert, in no acute distress  Breasts:    Inspection of Breasts:  Symmetric bilaterally.  No puckering.  No skin changes. Breast implants    Palpation of Breasts and Axillae:  No masses present on palpation, no breast tenderness   Axillary Lymph Nodes:  No lymphadenopathy present  Psychiatric:  Mentation appears normal and affect normal/bright.     In-Clinic Test Results:  No results found for this or any previous visit (from the past 24 hours).    ASSESSMENT/PLAN:                                                        ICD-10-CM    1. Cyclical breast pain  N64.4       2. Encounter for screening mammogram for breast cancer  Z12.31 MA Screen with Implants Bilateral w/Ignacio      3. Colon cancer screening  Z12.11 Colonoscopy Screening  Referral          There are no Patient Instructions on file for this visit.    -sent referral for mammo with implants  -sent colonoscopy  referral  -discussed cyclical breast pain and changes involved.   -recommend evening primrose oil     Breast pain  Breast pain is common. Up to 70% of women will experience breast pain in their lifetime.   Breast pain is most often related to normal physiologic change (hormonal fluctuations) and is a rare symptom of breast cancer.   Management of breast pain  Evaluation with imaging is important to determine if the pain is due to normal physiologic changes related to hormonal fluctuations or to a pathologic process.   Physical support  Wear a supportive Bra that is professionally fit and sized. Be sure your bra is not overly compressive or restrictive.   Wear a sports bra when exercising.   Wear comfortable breast support while sleeping.   Improving omega 3 fatty acid intake may improve breast pain. Eat 1-2 tablespoons of flaxseed daily.   Plant-based diets may help with breast pain.  Breast massage. (see handout)   Warm compresses or ice packs can be used  Acupuncture may help with breast pain.   [facility]   Consider eliminating or decrease caffeine (chocolate, tea, coffee, soda) for a two week period of time to see if pain improves/resolves  Evening Primrose Oil starting with 500 mg/day. May increase to 1000 mg 3 times daily for 6 months. Can be tried.   Chasteberry 400-500 mg/day can be tried.   If you are on hormone based medications adjusting or decreasing these medications may help.   If needed over the counter analgesics such as acetaminophen, ibuprofen, or topical diclofenac cream can intermittently be used.       JEN Silva [facility] [place]

## 2025-03-20 ENCOUNTER — PATIENT OUTREACH (OUTPATIENT)
Dept: CARE COORDINATION | Facility: CLINIC | Age: 45
End: 2025-03-20
Payer: COMMERCIAL

## 2025-03-25 ENCOUNTER — TELEPHONE (OUTPATIENT)
Dept: GASTROENTEROLOGY | Facility: CLINIC | Age: 45
End: 2025-03-25
Payer: COMMERCIAL

## 2025-03-25 NOTE — TELEPHONE ENCOUNTER
"Endoscopy Scheduling Screen    Have you had any respiratory illness or flu-like symptoms in the last 10 days?  No    What is your communication preference for Instructions and/or Bowel Prep?   MyChart    What insurance is in the chart?  Other:  [facility]    Ordering/Referring Provider:   TORIN PAREDES        (If ordering provider performs procedure, schedule with ordering provider unless otherwise instructed. )    BMI: Estimated body mass index is 24.36 kg/m  as calculated from the following:    Height as of 9/23/21: 1.651 m (5' 5\").    Weight as of 3/19/25: 66.4 kg (146 lb 6.4 oz).     Sedation Ordered  moderate sedation.   If patient BMI > 50 do not schedule in [facility].    If patient BMI > 45 do not schedule at [facility].    Are you taking methadone or Suboxone?  NO, No RN review required.    Have you been diagnosed and are being treated for severe PTSD or severe anxiety?  NO, No RN review required.    Are you taking any prescription medications for pain 3 or more times per week?   NO, No RN review required.    Do you have a history of malignant hyperthermia?  No    (Females) Are you currently pregnant?   No     Have you been diagnosed or told you have pulmonary hypertension?   No    Do you have an LVAD?  No    Have you been told you have moderate to severe sleep apnea?  No.    Have you been told you have COPD, asthma, or any other lung disease?  No    Has your doctor ordered any cardiac tests like echo, angiogram, stress test, ablation, or EKG, that you have not completed yet?  No    Do you  have a history of any heart conditions?  No     Have you ever had or are you waiting for an organ transplant?  No. Continue scheduling, no site restrictions.    Have you had a stroke or transient ischemic attack (TIA aka \"mini stroke\") in the last 2 years?   No.    Have you been diagnosed with or been told you have cirrhosis of the liver?   No.    Are you currently on dialysis?   No    Do you need assistance " "transferring?   No    BMI: Estimated body mass index is 24.36 kg/m  as calculated from the following:    Height as of 9/23/21: 1.651 m (5' 5\").    Weight as of 3/19/25: 66.4 kg (146 lb 6.4 oz).     Is patients BMI > 40 and scheduling location UP?  No    Do you take an injectable or oral medication for weight loss or diabetes (excluding insulin)?  No    Do you take the medication Naltrexone?  No    Do you take blood thinners?  No       Prep   Are you currently on dialysis or do you have chronic kidney disease?  No    Do you have a diagnosis of diabetes?  No    Do you have a diagnosis of cystic fibrosis (CF)?  No    On a regular basis do you go 3 -5 days between bowel movements?  No    BMI > 40?  No    Preferred Pharmacy:    [facility] [address]  [address] [place] [id number]  Phone: [phone] Fax: [phone]    Final Scheduling Details     Procedure scheduled  Colonoscopy    Surgeon:  LICHA     Date of procedure:  05/07/2025     Pre-OP / PAC:   No - Not required for this site.    Location  [facility] - [facility] - Per order.    Sedation   Moderate Sedation - Per order. Message sent to Dr. Razo for approval of moderate sedation. Pt mentioned being \"itchy\" the last time she received fentanyl.       Patient Reminders:   You will receive a call from a Nurse to review instructions and health history.  This assessment must be completed prior to your procedure.  Failure to complete the Nurse assessment may result in the procedure being cancelled.      On the day of your procedure, please designate an adult(s) who can drive you home stay with you for the next 24 hours. The medicines used in the exam will make you sleepy. You will not be able to drive.      You cannot take public transportation, ride share services, or non-medical taxi service without a responsible caregiver.  Medical transport services are allowed with the requirement that a responsible " caregiver will receive you at your destination.  We require that drivers and caregivers are confirmed prior to your procedure.

## 2025-03-25 NOTE — TELEPHONE ENCOUNTER
"  Reason for Reschedule/Cancellation (please be detailed, any staff messages or encounters to note?):   PT HAS EXPERIENCED ITCHING WHEN PREVIOUSLY RECEIVING MAC MESSAGE WAS SENT TO DR. HURT TO VERIFY SEDATION.    PER DR. HURT: \"OK TO ORDER MAC\"    Did you cancel or rescheduled an [facility] procedure? No.    Is screening questionnaire older than 3 months from the reschedule date.   If Yes, please complete screening questionnaire. No    Prior to reschedule please review:  Ordering Provider: TORIN PAREDES   Sedation Determined: MODERATE  Does patient have any ASC Exclusions, please identify?: NO    Notes on Cancelled Procedure:  Procedure: Lower Endoscopy [Colonoscopy]   Date: 05/07/2025  [facility] [address] [place] [address], [address] Floor, [place] [address]  Surgeon: LICHA    Rescheduled: No, LVM FOR PT TO CALL BACK TO SCHEDULE WITH MAC.             "

## 2025-03-27 ENCOUNTER — TELEPHONE (OUTPATIENT)
Dept: GASTROENTEROLOGY | Facility: CLINIC | Age: 45
End: 2025-03-27
Payer: COMMERCIAL

## 2025-03-27 NOTE — TELEPHONE ENCOUNTER
Rescheduled: Yes,   Procedure: Lower Endoscopy [Colonoscopy]    Date: 05/21/2025   Location: [facility]; [address], [place] [address]   Surgeon: JAMES   Sedation Level Scheduled  MAC ,  Reason for Sedation Level PER DR. HURT   Instructions updated and sent: VIA [employer]     Does patient need PAC or Pre -Op Rescheduled? : NO

## 2025-04-01 ENCOUNTER — MYC MEDICAL ADVICE (OUTPATIENT)
Dept: OBGYN | Facility: CLINIC | Age: 45
End: 2025-04-01
Payer: COMMERCIAL

## 2025-04-02 NOTE — TELEPHONE ENCOUNTER
Routing pt [employer] message to provider to advise.  Efrain Kyle RN on 4/2/2025 at 10:47 AM   [facility]

## 2025-04-02 NOTE — TELEPHONE ENCOUNTER
There is a mammo order for people with implants. The radiologist will then discuss with her and do what is needed.    Francisca Teran PA-C

## 2025-05-01 ENCOUNTER — OFFICE VISIT (OUTPATIENT)
Dept: MIDWIFE SERVICES | Facility: CLINIC | Age: 45
End: 2025-05-01
Payer: COMMERCIAL

## 2025-05-01 VITALS
BODY MASS INDEX: 23.63 KG/M2 | SYSTOLIC BLOOD PRESSURE: 102 MMHG | WEIGHT: 147 LBS | HEIGHT: 66 IN | DIASTOLIC BLOOD PRESSURE: 68 MMHG

## 2025-05-01 DIAGNOSIS — Z13.228 SCREENING FOR METABOLIC DISORDER: ICD-10-CM

## 2025-05-01 DIAGNOSIS — Z01.419 WELL WOMAN EXAM: Primary | ICD-10-CM

## 2025-05-01 PROBLEM — O34.219 HISTORY OF CESAREAN SECTION COMPLICATING PREGNANCY: Status: RESOLVED | Noted: 2021-05-12 | Resolved: 2025-05-01

## 2025-05-01 PROBLEM — O09.523 AMA (ADVANCED MATERNAL AGE) MULTIGRAVIDA 35+, THIRD TRIMESTER: Status: RESOLVED | Noted: 2021-05-12 | Resolved: 2025-05-01

## 2025-05-01 LAB
ANION GAP SERPL CALCULATED.3IONS-SCNC: 12 MMOL/L (ref 7–15)
BUN SERPL-MCNC: 19.9 MG/DL (ref 6–20)
CALCIUM SERPL-MCNC: 9.5 MG/DL (ref 8.8–10.4)
CHLORIDE SERPL-SCNC: 106 MMOL/L (ref 98–107)
CREAT SERPL-MCNC: 0.76 MG/DL (ref 0.51–0.95)
EGFRCR SERPLBLD CKD-EPI 2021: >90 ML/MIN/1.73M2
ERYTHROCYTE [DISTWIDTH] IN BLOOD BY AUTOMATED COUNT: 12.8 % (ref 10–15)
EST. AVERAGE GLUCOSE BLD GHB EST-MCNC: 103 MG/DL
GLUCOSE SERPL-MCNC: 92 MG/DL (ref 70–99)
HBA1C MFR BLD: 5.2 % (ref 0–5.6)
HCO3 SERPL-SCNC: 24 MMOL/L (ref 22–29)
HCT VFR BLD AUTO: 40.9 % (ref 35–47)
HGB BLD-MCNC: 13.3 G/DL (ref 11.7–15.7)
MCH RBC QN AUTO: 30.9 PG (ref 26.5–33)
MCHC RBC AUTO-ENTMCNC: 32.5 G/DL (ref 31.5–36.5)
MCV RBC AUTO: 95 FL (ref 78–100)
PLATELET # BLD AUTO: 290 10E3/UL (ref 150–450)
POTASSIUM SERPL-SCNC: 4.2 MMOL/L (ref 3.4–5.3)
RBC # BLD AUTO: 4.3 10E6/UL (ref 3.8–5.2)
SODIUM SERPL-SCNC: 142 MMOL/L (ref 135–145)
TSH SERPL DL<=0.005 MIU/L-ACNC: 1.26 UIU/ML (ref 0.3–4.2)
WBC # BLD AUTO: 8.1 10E3/UL (ref 4–11)

## 2025-05-01 NOTE — RESULT ENCOUNTER NOTE
Informed via mychart, normal labs. Can consider fasting lipid panel in the future as well.    YUNIEL Rivera, CNM

## 2025-05-01 NOTE — PROGRESS NOTES
An is a 44 year old  female who presents for annual exam.     Besides routine health maintenance, she has no other health concerns today .    HPI:  The patient's PCP is  Physician No Ref-Primary.    An is here for her annual exam and mammogram referral. She has no other concerns today. Declines any symptoms of perimenopause including hot flashes, irritability, or vaginal dryness.    She does experience bilateral cyclical breast pain - she had some breast aches yesterday, usually occurs a couple days before her period begins.    Exercise: HIIT, pilates, weight training, soccer in the summer, martial arts training, hiking   Diet: fish, chicken, occasional red meat, veggies, soups, limits refined sugar   Sleep: falls asleep right away, light sleeper, tosses and turns throughout the night  Mental Health: good  Supplements: collagen, multivitamin, vit D    GYNECOLOGIC HISTORY:    Patient's last menstrual period was 2025 (approximate).    Regular menses? yes  Menses every 28-30 days.  Length of menses: 6 days    Her current contraception method is: condoms.  She  reports that she quit smoking about 8 years ago. Her smoking use included cigarettes. She started smoking about 18 years ago. She has been exposed to tobacco smoke. She has quit using smokeless tobacco.  Tobacco Cessation Action Plan: Information offered: Patient not interested at this time  Patient is sexually active.  STD testing offered?  Declined  Last PHQ-9 score on record =       2021     3:15 PM   PHQ-9 SCORE   PHQ-9 Total Score 0     Last GAD7 score on record =       2021     3:15 PM   ZACKERY-7 SCORE   Total Score 0       HEALTH MAINTENANCE:    Pap:   Lab Results   Component Value Date    GYNINTERP  2021     Negative for Intraepithelial Lesion or Malignancy (NILM)    PAP  2015     PAP: Negative for Intraepithelial Lesion or Malignancy; HPV: Negative        Health maintenance updated:  yes    Care Gaps  Close care  gaps  Overdue          Never done ASTHMA ACTION PLAN (Yearly)     Never done ASTHMA CONTROL TEST (Every 6 Months)     Never done ADVANCE CARE PLANNING (Every 5 Years)     Never done MAMMO SCREENING (Every 2 Years)   Last ordered: Mar 19, 2025     Never done YEARLY PREVENTIVE VISIT (Yearly)     Never done COLORECTAL CANCER SCREENING (View topic details)   Last ordered: Mar 19, 2025     Never done HEPATITIS C SCREENING (Once)     Never done Pneumococcal Vaccine: Pediatrics (0 to 5 Years) and At-Risk Patients (6 to 49 Years) (1 of 2 - PCV)     Never done HEPATITIS B IMMUNIZATION (1 of 3 - 19+ 3-dose series)     Never done LIPID (Every 5 Years)     MAR 17  2024 DIABETES SCREENING (Every 3 Years)  Last completed: Mar 17, 2021     Never done COVID-19 Vaccine ( season)         Upcoming          SEP 1  2025 INFLUENZA VACCINE (Season Ended)  Last completed: 2020     SEP 23  2026 HPV TEST (Once)  Last completed: Sep 23, 2021     SEP 23  2026 PAP (Every 5 Years)  Last completed: Sep 23, 2021     OCT 15  2029 ZOSTER IMMUNIZATION (1 of 2)       MAY 11  2031 DTAP/TDAP/TD IMMUNIZATION (3 - Td or Tdap)  Last completed: May 11, 2021     HISTORY:  OB History    Para Term  AB Living   4 2 2 0 2 2   SAB IAB Ectopic Multiple Live Births   2 0 0 0 2      # Outcome Date GA Lbr Benito/2nd Weight Sex Type Anes PTL Lv   4 Term 21 38w5d  4.08 kg (8 lb 15.9 oz) M CS-LTranv Spinal  CHASE      Name: DEBBIE,SUSAN-KING      Apgar1: 8  Apgar5: 9   3 SAB 20 9w4d          2 SAB 2019           1 Term 17 39w3d  3.25 kg (7 lb 2.6 oz) M CS-LTranv  N CHASE      Name: Alec      Apgar1: 8  Apgar5: 9       Patient Active Problem List   Diagnosis    Exercise-induced asthma    Dry skin    Eczema     Past Surgical History:   Procedure Laterality Date    BREAST SURGERY  4902-2720    Breast Augmentatation, saline implants.     SECTION N/A 2017    Procedure:  SECTION;;  Surgeon: Caryn Lozano  "MD;  Location:  [facility]     SECTION N/A 2021    Procedure: REPEAT  SECTION,;  Surgeon: Caryn Lozano MD;  Location:  [facility]    CYSTECTOMY OVARIAN BENIGN N/A 2021    Procedure: EXCISION OF OVARIAN CYST;  Surgeon: Caryn Lozano MD;  Location:  [facility]    ENT SURGERY  2000    [place] teeth removed.      Social History     Tobacco Use    Smoking status: Former     Current packs/day: 0.00     Types: Cigarettes     Start date: 3/5/2007     Quit date: 3/5/2017     Years since quittin.1     Passive exposure: Past    Smokeless tobacco: Former    Tobacco comments:     Social smoker, has not smoked since pregnancy   Substance Use Topics    Alcohol use: Yes     Comment: 3-5 drinks per month      Problem (# of Occurrences) Relation (Name,Age of Onset)    Diabetes (1) Father    Heart Disease (1) Maternal Uncle    Hypertension (1) Mother    Cerebrovascular Disease (3) Paternal Grandmother, Other (Maternal Grandmother), Maternal Uncle    Myocardial Infarction (3) Maternal Grandfather, Paternal Grandfather, Maternal Uncle    Hyperlipidemia (1) Father    Other - See Comments (1) Mother: Neck tumor-was removed and was benign.    Ovarian Cancer (1) Mother: uncertain if mother had ovarian cancer or suspicious mass, ovaries removed in early 30s.    Colon Cancer (1) Maternal Grandmother              Current Outpatient Medications   Medication Sig Dispense Refill    Cholecalciferol (VITAMIN D3) 25 MCG (1000 UT) CAPS Take 2,000 Int'l Units by mouth daily      multivitamin w/minerals (MULTI-VITAMIN) tablet Take 1 tablet by mouth daily.       No current facility-administered medications for this visit.     Allergies   Allergen Reactions    Nkda [No Known Drug Allergy]        Past medical, surgical, social and family histories were reviewed and updated in EPIC.    ROS:   12 point review of systems negative other than symptoms noted below or in the HPI.    EXAM:  /68   Ht 1.664 m (5' 5.5\")   Wt 66.7 kg (147 " lb)   LMP 04/14/2025 (Approximate)   BMI 24.09 kg/m     BMI: Body mass index is 24.09 kg/m .    PHYSICAL EXAM:  Constitutional: Well nourished, well developed, alert, in no acute distress  Neck: No lymphadenopathy present. Thyroid gland size normal, nontender, no nodules or masses present on palpation.  Chest: Breathing unlabored, clear upon auscultation bilaterally.  Cardiovascular: Regular rate, normal rhythm, no murmurs present  Breasts: Palpation of Breasts and Axillae:  No masses present on palpation, no breast tenderness. Saline breast implants.  Gastrointestinal: Abdomen nontender to palpation, tone normal without rigidity or guarding, no masses present, umbilicus without lesions  Liver and Spleen:  No hepatomegaly present, liver nontender to palpation  Skin: No rashes present, no lesions present, no areas of discoloration  Neurologic: Oriented X3. Normal strength and tone, sensory exam grossly normal.   Psychiatric:  Mentation appears normal and affect normal/bright.        No Pelvic Exam performed, deferred until 2026 when Pap is due    BMI: Body mass index is 24.09 kg/m .    ASSESSMENT:  44 year old female with satisfactory annual exam.    ICD-10-CM    1. Well woman exam  Z01.419 CBC with platelets     Basic metabolic panel  (Ca, Cl, CO2, Creat, Gluc, K, Na, BUN)     TSH with free T4 reflex     Hemoglobin A1c     CBC with platelets     Basic metabolic panel  (Ca, Cl, CO2, Creat, Gluc, K, Na, BUN)     TSH with free T4 reflex     Hemoglobin A1c      2. Screening for metabolic disorder  Z13.228 CBC with platelets     Basic metabolic panel  (Ca, Cl, CO2, Creat, Gluc, K, Na, BUN)     TSH with free T4 reflex     Hemoglobin A1c     CBC with platelets     Basic metabolic panel  (Ca, Cl, CO2, Creat, Gluc, K, Na, BUN)     TSH with free T4 reflex     Hemoglobin A1c        COUNSELING:   Reviewed preventive health counseling, as reflected in patient instructions       Regular exercise       Healthy diet/nutrition        Vision screening       Osteoporosis prevention/bone health       Colorectal Cancer Screening    Discussed symptoms of perimenopause and expected cycle changes.   Discussed routine Pap screening, not due until next year and then continuing every 5 years as long as the result is normal.  Discussed recommendations surrounding mammograms and colonoscopies - will complete both this year  Discussed screening labs, will order all labs minus fasting lipids; will have fasting lipids next year.    PLAN:  Return in one year for Pap and fasting lipids screening or sooner if problem arises.    Tara Rivas, Student Nurse Midwife     I was present with the student who participated in the service and in the documentation of the note. I have verified the history and personally performed the physical exam and medical decision-making. I agree with the assessment and plan of care as documented in the note.    YUNIEL Tomas CNM

## 2025-05-06 ENCOUNTER — ANCILLARY PROCEDURE (OUTPATIENT)
Dept: MAMMOGRAPHY | Facility: CLINIC | Age: 45
End: 2025-05-06
Payer: COMMERCIAL

## 2025-05-06 DIAGNOSIS — Z12.31 ENCOUNTER FOR SCREENING MAMMOGRAM FOR BREAST CANCER: ICD-10-CM

## 2025-05-06 PROCEDURE — 77067 SCR MAMMO BI INCL CAD: CPT

## 2025-05-06 PROCEDURE — 77063 BREAST TOMOSYNTHESIS BI: CPT

## 2025-05-07 ENCOUNTER — TELEPHONE (OUTPATIENT)
Dept: GASTROENTEROLOGY | Facility: CLINIC | Age: 45
End: 2025-05-07
Payer: COMMERCIAL

## 2025-05-07 NOTE — TELEPHONE ENCOUNTER
Attempted to contact patient in order to complete pre assessment questions.     No answer. Left message to return call to [phone] option 3.    Callback communication sent via [employer].    Discuss sedation (see documentation below and endoscopy scheduling encounter 3.25.2025)    Lesli Mann RN

## 2025-05-07 NOTE — TELEPHONE ENCOUNTER
"Pre visit planning completed.      Procedure details:    Patient scheduled for Colonoscopy on 5.21.2025.     Arrival time: 0700. Procedure time 0800    Facility location: [facility]; [address], [place] [address]. Check in location: 2nd Floor.    Sedation type: MAC- verify with patient that it is fentanyl that they have experienced \"itching\" with. No known issues to propofol?  Upon chart review 7.9.2021: propofol and fentanyl were both given. May need to send FYI to [facility].    Pre op exam needed? No.    Indication for procedure: screening colonoscopy      Chart review:     Electronic implanted devices? No    Recent diagnosis of diverticulitis within the last 6 weeks? No      Medication review:    Diabetic? No    Anticoagulants? No    Weight loss medication/injectable? No GLP-1 medication per patient's medication list. Nursing to verify with pre-assessment call.    Other medication HOLDING recommendations:  N/A      Prep for procedure:     Bowel prep recommendation: Standard Miralax.   Due to: standard bowel prep    Procedure information and instructions sent via [employer]         Lesli Mann RN  Endoscopy Procedure Pre Assessment   [phone] option 3   "

## 2025-05-12 NOTE — TELEPHONE ENCOUNTER
Second call attempt to complete pre assessment.     No answer. Left message to return call to [phone] #3 by next business day prior to 4PM.    Callback communication sent via [employer].      Lesli Mann RN  Endoscopy Procedure Pre Assessment

## 2025-05-15 NOTE — TELEPHONE ENCOUNTER
Pre assessment completed for upcoming procedure.   (Please see previous telephone encounter notes for complete details)      Procedure details:    Arrival time and facility location reviewed.    Pre op exam needed? No.    Designated  policy reviewed. Instructed to have someone stay 24  hours post procedure.       Medication review:    Medications reviewed. Please see supporting documentation below. Holding recommendations discussed (if applicable).       Prep for procedure:     Procedure prep instructions reviewed.        Any additional information needed:  Verified that allergy is to Fentanyl per patient's understanding.       Patient verbalized understanding and had no questions or concerns at this time.      Lesli Mann RN  Endoscopy Procedure Pre Assessment   [phone] option 3

## 2025-05-21 ENCOUNTER — TRANSFERRED RECORDS (OUTPATIENT)
Dept: HEALTH INFORMATION MANAGEMENT | Facility: CLINIC | Age: 45
End: 2025-05-21
Payer: COMMERCIAL

## 2025-05-21 ENCOUNTER — RESULTS FOLLOW-UP (OUTPATIENT)
Dept: GASTROENTEROLOGY | Facility: CLINIC | Age: 45
End: 2025-05-21

## (undated) DEVICE — SU PLAIN 0 FN-2 27" N864H

## (undated) DEVICE — ESU GROUND PAD UNIVERSAL W/O CORD

## (undated) DEVICE — SU VICRYL 3-0 SH 27" J316H

## (undated) DEVICE — GLOVE PROTEXIS W/NEU-THERA 6.0  2D73TE60

## (undated) DEVICE — SU PLAIN 2-0 CT 27" 853H

## (undated) DEVICE — SOL NACL 0.9% IRRIG 1000ML BOTTLE 07138-09

## (undated) DEVICE — CATH TRAY FOLEY 16FR BARDEX W/DRAIN BAG STATLOCK 300316A

## (undated) DEVICE — SOL NACL 0.9% IRRIG 1000ML BOTTLE 2F7124

## (undated) DEVICE — SU VICRYL 0 CT-1 27" J340H

## (undated) DEVICE — LINEN C-SECTION 5415

## (undated) DEVICE — SUCTION CANISTER MEDIVAC LINER 3000ML W/LID 65651-530

## (undated) DEVICE — PREP CHLORAPREP 26ML TINTED ORANGE  260815

## (undated) DEVICE — BLADE CLIPPER 4406

## (undated) DEVICE — GLOVE PROTEXIS POWDER FREE 7.0 ORTHOPEDIC 2D73ET70

## (undated) DEVICE — DRSG STERI STRIP 1/2X4" R1547

## (undated) DEVICE — PACK C-SECTION LF PL15OTA83B

## (undated) DEVICE — SURGICEL POWDER ABSORBABLE HEMOSTAT 3GM 3013SP

## (undated) DEVICE — SU CHROMIC 0 CT 27" 802H

## (undated) DEVICE — SOL WATER IRRIG 1000ML BOTTLE 07139-09

## (undated) RX ORDER — KETOROLAC TROMETHAMINE 30 MG/ML
INJECTION, SOLUTION INTRAMUSCULAR; INTRAVENOUS
Status: DISPENSED
Start: 2021-07-09

## (undated) RX ORDER — FENTANYL CITRATE 50 UG/ML
INJECTION, SOLUTION INTRAMUSCULAR; INTRAVENOUS
Status: DISPENSED
Start: 2021-07-09

## (undated) RX ORDER — ONDANSETRON 2 MG/ML
INJECTION INTRAMUSCULAR; INTRAVENOUS
Status: DISPENSED
Start: 2021-07-09

## (undated) RX ORDER — MORPHINE SULFATE 1 MG/ML
INJECTION, SOLUTION EPIDURAL; INTRATHECAL; INTRAVENOUS
Status: DISPENSED
Start: 2021-07-09

## (undated) RX ORDER — OXYTOCIN/0.9 % SODIUM CHLORIDE 30/500 ML
PLASTIC BAG, INJECTION (ML) INTRAVENOUS
Status: DISPENSED
Start: 2021-07-09